# Patient Record
Sex: MALE | Race: WHITE
[De-identification: names, ages, dates, MRNs, and addresses within clinical notes are randomized per-mention and may not be internally consistent; named-entity substitution may affect disease eponyms.]

---

## 2017-05-22 ENCOUNTER — MEDICATION RENEWAL (OUTPATIENT)
Age: 82
End: 2017-05-22

## 2018-04-18 ENCOUNTER — CHART COPY (OUTPATIENT)
Age: 83
End: 2018-04-18

## 2018-04-18 RX ORDER — LUBIPROSTONE 8 UG/1
8 CAPSULE, GELATIN COATED ORAL
Qty: 60 | Refills: 5 | Status: ACTIVE | COMMUNITY
Start: 2017-05-22 | End: 1900-01-01

## 2018-11-28 ENCOUNTER — OUTPATIENT (OUTPATIENT)
Dept: OUTPATIENT SERVICES | Facility: HOSPITAL | Age: 83
LOS: 1 days | End: 2018-11-28
Payer: MEDICARE

## 2018-11-28 DIAGNOSIS — T84.032A MECHANICAL LOOSENING OF INTERNAL RIGHT KNEE PROSTHETIC JOINT, INITIAL ENCOUNTER: ICD-10-CM

## 2018-11-28 PROCEDURE — A9561: CPT

## 2018-11-28 PROCEDURE — 78315 BONE IMAGING 3 PHASE: CPT | Mod: 26

## 2018-11-28 PROCEDURE — 78315 BONE IMAGING 3 PHASE: CPT

## 2020-04-22 ENCOUNTER — TRANSCRIPTION ENCOUNTER (OUTPATIENT)
Age: 85
End: 2020-04-22

## 2020-04-23 ENCOUNTER — INPATIENT (INPATIENT)
Facility: HOSPITAL | Age: 85
LOS: 4 days | Discharge: HOME | DRG: 470 | End: 2020-04-28
Attending: INTERNAL MEDICINE | Admitting: INTERNAL MEDICINE
Payer: MEDICARE

## 2020-04-23 ENCOUNTER — TRANSCRIPTION ENCOUNTER (OUTPATIENT)
Age: 85
End: 2020-04-23

## 2020-04-23 ENCOUNTER — RESULT REVIEW (OUTPATIENT)
Age: 85
End: 2020-04-23

## 2020-04-23 VITALS
SYSTOLIC BLOOD PRESSURE: 158 MMHG | DIASTOLIC BLOOD PRESSURE: 82 MMHG | TEMPERATURE: 99 F | HEART RATE: 83 BPM | OXYGEN SATURATION: 100 % | RESPIRATION RATE: 16 BRPM

## 2020-04-23 DIAGNOSIS — Z96.641 PRESENCE OF RIGHT ARTIFICIAL HIP JOINT: Chronic | ICD-10-CM

## 2020-04-23 DIAGNOSIS — S72.002A FRACTURE OF UNSPECIFIED PART OF NECK OF LEFT FEMUR, INITIAL ENCOUNTER FOR CLOSED FRACTURE: ICD-10-CM

## 2020-04-23 DIAGNOSIS — N40.0 BENIGN PROSTATIC HYPERPLASIA WITHOUT LOWER URINARY TRACT SYMPTOMS: ICD-10-CM

## 2020-04-23 DIAGNOSIS — I10 ESSENTIAL (PRIMARY) HYPERTENSION: ICD-10-CM

## 2020-04-23 DIAGNOSIS — D64.9 ANEMIA, UNSPECIFIED: ICD-10-CM

## 2020-04-23 DIAGNOSIS — E78.00 PURE HYPERCHOLESTEROLEMIA, UNSPECIFIED: ICD-10-CM

## 2020-04-23 DIAGNOSIS — Z95.2 PRESENCE OF PROSTHETIC HEART VALVE: Chronic | ICD-10-CM

## 2020-04-23 DIAGNOSIS — Z96.651 PRESENCE OF RIGHT ARTIFICIAL KNEE JOINT: Chronic | ICD-10-CM

## 2020-04-23 DIAGNOSIS — D72.829 ELEVATED WHITE BLOOD CELL COUNT, UNSPECIFIED: ICD-10-CM

## 2020-04-23 DIAGNOSIS — Z29.9 ENCOUNTER FOR PROPHYLACTIC MEASURES, UNSPECIFIED: ICD-10-CM

## 2020-04-23 DIAGNOSIS — N17.9 ACUTE KIDNEY FAILURE, UNSPECIFIED: ICD-10-CM

## 2020-04-23 DIAGNOSIS — I25.10 ATHEROSCLEROTIC HEART DISEASE OF NATIVE CORONARY ARTERY WITHOUT ANGINA PECTORIS: ICD-10-CM

## 2020-04-23 LAB
ABO RH CONFIRMATION: SIGNIFICANT CHANGE UP
ALBUMIN SERPL ELPH-MCNC: 4 G/DL — SIGNIFICANT CHANGE UP (ref 3.3–5)
ALP SERPL-CCNC: 76 U/L — SIGNIFICANT CHANGE UP (ref 40–120)
ALT FLD-CCNC: 35 U/L — SIGNIFICANT CHANGE UP (ref 12–78)
ANION GAP SERPL CALC-SCNC: 8 MMOL/L — SIGNIFICANT CHANGE UP (ref 5–17)
ANION GAP SERPL CALC-SCNC: 8 MMOL/L — SIGNIFICANT CHANGE UP (ref 5–17)
APTT BLD: 26.3 SEC — LOW (ref 28.5–37)
AST SERPL-CCNC: 26 U/L — SIGNIFICANT CHANGE UP (ref 15–37)
BASOPHILS # BLD AUTO: 0.07 K/UL — SIGNIFICANT CHANGE UP (ref 0–0.2)
BASOPHILS NFR BLD AUTO: 0.5 % — SIGNIFICANT CHANGE UP (ref 0–2)
BILIRUB SERPL-MCNC: 0.8 MG/DL — SIGNIFICANT CHANGE UP (ref 0.2–1.2)
BLD GP AB SCN SERPL QL: SIGNIFICANT CHANGE UP
BUN SERPL-MCNC: 37 MG/DL — HIGH (ref 7–23)
BUN SERPL-MCNC: 46 MG/DL — HIGH (ref 7–23)
CALCIUM SERPL-MCNC: 8.8 MG/DL — SIGNIFICANT CHANGE UP (ref 8.5–10.1)
CALCIUM SERPL-MCNC: 8.8 MG/DL — SIGNIFICANT CHANGE UP (ref 8.5–10.1)
CHLORIDE SERPL-SCNC: 105 MMOL/L — SIGNIFICANT CHANGE UP (ref 96–108)
CHLORIDE SERPL-SCNC: 109 MMOL/L — HIGH (ref 96–108)
CO2 SERPL-SCNC: 25 MMOL/L — SIGNIFICANT CHANGE UP (ref 22–31)
CO2 SERPL-SCNC: 26 MMOL/L — SIGNIFICANT CHANGE UP (ref 22–31)
CREAT SERPL-MCNC: 1.6 MG/DL — HIGH (ref 0.5–1.3)
CREAT SERPL-MCNC: 1.8 MG/DL — HIGH (ref 0.5–1.3)
EOSINOPHIL # BLD AUTO: 0.07 K/UL — SIGNIFICANT CHANGE UP (ref 0–0.5)
EOSINOPHIL NFR BLD AUTO: 0.5 % — SIGNIFICANT CHANGE UP (ref 0–6)
GLUCOSE SERPL-MCNC: 116 MG/DL — HIGH (ref 70–99)
GLUCOSE SERPL-MCNC: 95 MG/DL — SIGNIFICANT CHANGE UP (ref 70–99)
HCT VFR BLD CALC: 28.2 % — LOW (ref 39–50)
HCT VFR BLD CALC: 30.1 % — LOW (ref 39–50)
HGB BLD-MCNC: 8.6 G/DL — LOW (ref 13–17)
HGB BLD-MCNC: 9.4 G/DL — LOW (ref 13–17)
IMM GRANULOCYTES NFR BLD AUTO: 0.4 % — SIGNIFICANT CHANGE UP (ref 0–1.5)
INR BLD: 1.08 RATIO — SIGNIFICANT CHANGE UP (ref 0.88–1.16)
LYMPHOCYTES # BLD AUTO: 0.75 K/UL — LOW (ref 1–3.3)
LYMPHOCYTES # BLD AUTO: 5.5 % — LOW (ref 13–44)
MCHC RBC-ENTMCNC: 28 PG — SIGNIFICANT CHANGE UP (ref 27–34)
MCHC RBC-ENTMCNC: 28.1 PG — SIGNIFICANT CHANGE UP (ref 27–34)
MCHC RBC-ENTMCNC: 30.5 GM/DL — LOW (ref 32–36)
MCHC RBC-ENTMCNC: 31.2 GM/DL — LOW (ref 32–36)
MCV RBC AUTO: 89.9 FL — SIGNIFICANT CHANGE UP (ref 80–100)
MCV RBC AUTO: 91.9 FL — SIGNIFICANT CHANGE UP (ref 80–100)
MONOCYTES # BLD AUTO: 0.68 K/UL — SIGNIFICANT CHANGE UP (ref 0–0.9)
MONOCYTES NFR BLD AUTO: 5 % — SIGNIFICANT CHANGE UP (ref 2–14)
NEUTROPHILS # BLD AUTO: 11.95 K/UL — HIGH (ref 1.8–7.4)
NEUTROPHILS NFR BLD AUTO: 88.1 % — HIGH (ref 43–77)
NRBC # BLD: 0 /100 WBCS — SIGNIFICANT CHANGE UP (ref 0–0)
NRBC # BLD: 0 /100 WBCS — SIGNIFICANT CHANGE UP (ref 0–0)
PLATELET # BLD AUTO: 150 K/UL — SIGNIFICANT CHANGE UP (ref 150–400)
PLATELET # BLD AUTO: 168 K/UL — SIGNIFICANT CHANGE UP (ref 150–400)
POTASSIUM SERPL-MCNC: 4.7 MMOL/L — SIGNIFICANT CHANGE UP (ref 3.5–5.3)
POTASSIUM SERPL-MCNC: 4.7 MMOL/L — SIGNIFICANT CHANGE UP (ref 3.5–5.3)
POTASSIUM SERPL-SCNC: 4.7 MMOL/L — SIGNIFICANT CHANGE UP (ref 3.5–5.3)
POTASSIUM SERPL-SCNC: 4.7 MMOL/L — SIGNIFICANT CHANGE UP (ref 3.5–5.3)
PROT SERPL-MCNC: 7.6 G/DL — SIGNIFICANT CHANGE UP (ref 6–8.3)
PROTHROM AB SERPL-ACNC: 12.1 SEC — SIGNIFICANT CHANGE UP (ref 10–12.9)
RBC # BLD: 3.07 M/UL — LOW (ref 4.2–5.8)
RBC # BLD: 3.35 M/UL — LOW (ref 4.2–5.8)
RBC # FLD: 12.5 % — SIGNIFICANT CHANGE UP (ref 10.3–14.5)
RBC # FLD: 12.7 % — SIGNIFICANT CHANGE UP (ref 10.3–14.5)
SARS-COV-2 RNA SPEC QL NAA+PROBE: SIGNIFICANT CHANGE UP
SODIUM SERPL-SCNC: 139 MMOL/L — SIGNIFICANT CHANGE UP (ref 135–145)
SODIUM SERPL-SCNC: 142 MMOL/L — SIGNIFICANT CHANGE UP (ref 135–145)
TRANSFERRIN SERPL-MCNC: 343 MG/DL — SIGNIFICANT CHANGE UP (ref 200–360)
WBC # BLD: 13.29 K/UL — HIGH (ref 3.8–10.5)
WBC # BLD: 13.57 K/UL — HIGH (ref 3.8–10.5)
WBC # FLD AUTO: 13.29 K/UL — HIGH (ref 3.8–10.5)
WBC # FLD AUTO: 13.57 K/UL — HIGH (ref 3.8–10.5)

## 2020-04-23 PROCEDURE — 72125 CT NECK SPINE W/O DYE: CPT | Mod: 26

## 2020-04-23 PROCEDURE — 73502 X-RAY EXAM HIP UNI 2-3 VIEWS: CPT | Mod: 26,LT

## 2020-04-23 PROCEDURE — 73552 X-RAY EXAM OF FEMUR 2/>: CPT | Mod: 26,LT

## 2020-04-23 PROCEDURE — 93010 ELECTROCARDIOGRAM REPORT: CPT

## 2020-04-23 PROCEDURE — 99285 EMERGENCY DEPT VISIT HI MDM: CPT

## 2020-04-23 PROCEDURE — 88311 DECALCIFY TISSUE: CPT | Mod: 26

## 2020-04-23 PROCEDURE — 70450 CT HEAD/BRAIN W/O DYE: CPT | Mod: 26

## 2020-04-23 PROCEDURE — 88305 TISSUE EXAM BY PATHOLOGIST: CPT | Mod: 26

## 2020-04-23 PROCEDURE — 71045 X-RAY EXAM CHEST 1 VIEW: CPT | Mod: 26

## 2020-04-23 PROCEDURE — 99223 1ST HOSP IP/OBS HIGH 75: CPT

## 2020-04-23 RX ORDER — OXYCODONE HYDROCHLORIDE 5 MG/1
5 TABLET ORAL ONCE
Refills: 0 | Status: DISCONTINUED | OUTPATIENT
Start: 2020-04-23 | End: 2020-04-23

## 2020-04-23 RX ORDER — POLYETHYLENE GLYCOL 3350 17 G/17G
17 POWDER, FOR SOLUTION ORAL DAILY
Refills: 0 | Status: DISCONTINUED | OUTPATIENT
Start: 2020-04-23 | End: 2020-04-28

## 2020-04-23 RX ORDER — ATORVASTATIN CALCIUM 80 MG/1
10 TABLET, FILM COATED ORAL AT BEDTIME
Refills: 0 | Status: DISCONTINUED | OUTPATIENT
Start: 2020-04-23 | End: 2020-04-23

## 2020-04-23 RX ORDER — MORPHINE SULFATE 50 MG/1
2 CAPSULE, EXTENDED RELEASE ORAL EVERY 6 HOURS
Refills: 0 | Status: DISCONTINUED | OUTPATIENT
Start: 2020-04-23 | End: 2020-04-28

## 2020-04-23 RX ORDER — SODIUM CHLORIDE 9 MG/ML
1000 INJECTION, SOLUTION INTRAVENOUS
Refills: 0 | Status: DISCONTINUED | OUTPATIENT
Start: 2020-04-23 | End: 2020-04-23

## 2020-04-23 RX ORDER — SENNA PLUS 8.6 MG/1
2 TABLET ORAL AT BEDTIME
Refills: 0 | Status: DISCONTINUED | OUTPATIENT
Start: 2020-04-23 | End: 2020-04-28

## 2020-04-23 RX ORDER — OXYCODONE HYDROCHLORIDE 5 MG/1
2.5 TABLET ORAL EVERY 4 HOURS
Refills: 0 | Status: DISCONTINUED | OUTPATIENT
Start: 2020-04-23 | End: 2020-04-23

## 2020-04-23 RX ORDER — MORPHINE SULFATE 50 MG/1
1 CAPSULE, EXTENDED RELEASE ORAL ONCE
Refills: 0 | Status: DISCONTINUED | OUTPATIENT
Start: 2020-04-23 | End: 2020-04-23

## 2020-04-23 RX ORDER — SODIUM CHLORIDE 9 MG/ML
1000 INJECTION, SOLUTION INTRAVENOUS
Refills: 0 | Status: DISCONTINUED | OUTPATIENT
Start: 2020-04-23 | End: 2020-04-24

## 2020-04-23 RX ORDER — HYDROMORPHONE HYDROCHLORIDE 2 MG/ML
0.5 INJECTION INTRAMUSCULAR; INTRAVENOUS; SUBCUTANEOUS
Refills: 0 | Status: DISCONTINUED | OUTPATIENT
Start: 2020-04-23 | End: 2020-04-23

## 2020-04-23 RX ORDER — METOPROLOL TARTRATE 50 MG
50 TABLET ORAL DAILY
Refills: 0 | Status: DISCONTINUED | OUTPATIENT
Start: 2020-04-23 | End: 2020-04-28

## 2020-04-23 RX ORDER — ACETAMINOPHEN 500 MG
650 TABLET ORAL EVERY 6 HOURS
Refills: 0 | Status: COMPLETED | OUTPATIENT
Start: 2020-04-23 | End: 2020-04-26

## 2020-04-23 RX ORDER — BENZOCAINE AND MENTHOL 5; 1 G/100ML; G/100ML
1 LIQUID ORAL
Refills: 0 | Status: DISCONTINUED | OUTPATIENT
Start: 2020-04-23 | End: 2020-04-28

## 2020-04-23 RX ORDER — MORPHINE SULFATE 50 MG/1
3 CAPSULE, EXTENDED RELEASE ORAL EVERY 6 HOURS
Refills: 0 | Status: DISCONTINUED | OUTPATIENT
Start: 2020-04-23 | End: 2020-04-23

## 2020-04-23 RX ORDER — ONDANSETRON 8 MG/1
4 TABLET, FILM COATED ORAL ONCE
Refills: 0 | Status: DISCONTINUED | OUTPATIENT
Start: 2020-04-23 | End: 2020-04-23

## 2020-04-23 RX ORDER — ASCORBIC ACID 60 MG
500 TABLET,CHEWABLE ORAL
Refills: 0 | Status: DISCONTINUED | OUTPATIENT
Start: 2020-04-23 | End: 2020-04-28

## 2020-04-23 RX ORDER — FOLIC ACID 0.8 MG
1 TABLET ORAL DAILY
Refills: 0 | Status: DISCONTINUED | OUTPATIENT
Start: 2020-04-23 | End: 2020-04-28

## 2020-04-23 RX ORDER — HEPARIN SODIUM 5000 [USP'U]/ML
5000 INJECTION INTRAVENOUS; SUBCUTANEOUS EVERY 8 HOURS
Refills: 0 | Status: DISCONTINUED | OUTPATIENT
Start: 2020-04-23 | End: 2020-04-26

## 2020-04-23 RX ORDER — MORPHINE SULFATE 50 MG/1
2 CAPSULE, EXTENDED RELEASE ORAL EVERY 4 HOURS
Refills: 0 | Status: DISCONTINUED | OUTPATIENT
Start: 2020-04-23 | End: 2020-04-23

## 2020-04-23 RX ORDER — FINASTERIDE 5 MG/1
5 TABLET, FILM COATED ORAL DAILY
Refills: 0 | Status: DISCONTINUED | OUTPATIENT
Start: 2020-04-23 | End: 2020-04-28

## 2020-04-23 RX ORDER — ATORVASTATIN CALCIUM 80 MG/1
10 TABLET, FILM COATED ORAL AT BEDTIME
Refills: 0 | Status: DISCONTINUED | OUTPATIENT
Start: 2020-04-23 | End: 2020-04-28

## 2020-04-23 RX ORDER — TRAMADOL HYDROCHLORIDE 50 MG/1
1 TABLET ORAL
Qty: 0 | Refills: 0 | DISCHARGE

## 2020-04-23 RX ORDER — LANOLIN ALCOHOL/MO/W.PET/CERES
3 CREAM (GRAM) TOPICAL AT BEDTIME
Refills: 0 | Status: DISCONTINUED | OUTPATIENT
Start: 2020-04-23 | End: 2020-04-28

## 2020-04-23 RX ORDER — FINASTERIDE 5 MG/1
5 TABLET, FILM COATED ORAL DAILY
Refills: 0 | Status: DISCONTINUED | OUTPATIENT
Start: 2020-04-23 | End: 2020-04-23

## 2020-04-23 RX ORDER — SENNA PLUS 8.6 MG/1
2 TABLET ORAL AT BEDTIME
Refills: 0 | Status: DISCONTINUED | OUTPATIENT
Start: 2020-04-23 | End: 2020-04-23

## 2020-04-23 RX ORDER — CEFAZOLIN SODIUM 1 G
2000 VIAL (EA) INJECTION EVERY 8 HOURS
Refills: 0 | Status: COMPLETED | OUTPATIENT
Start: 2020-04-23 | End: 2020-04-24

## 2020-04-23 RX ORDER — ESCITALOPRAM OXALATE 10 MG/1
20 TABLET, FILM COATED ORAL DAILY
Refills: 0 | Status: DISCONTINUED | OUTPATIENT
Start: 2020-04-23 | End: 2020-04-23

## 2020-04-23 RX ORDER — MAGNESIUM HYDROXIDE 400 MG/1
30 TABLET, CHEWABLE ORAL DAILY
Refills: 0 | Status: DISCONTINUED | OUTPATIENT
Start: 2020-04-23 | End: 2020-04-28

## 2020-04-23 RX ORDER — TRAMADOL HYDROCHLORIDE 50 MG/1
50 TABLET ORAL EVERY 6 HOURS
Refills: 0 | Status: DISCONTINUED | OUTPATIENT
Start: 2020-04-23 | End: 2020-04-28

## 2020-04-23 RX ORDER — MORPHINE SULFATE 50 MG/1
1 CAPSULE, EXTENDED RELEASE ORAL ONCE
Refills: 0 | Status: COMPLETED | OUTPATIENT
Start: 2020-04-23 | End: 2020-04-23

## 2020-04-23 RX ORDER — OXYCODONE HYDROCHLORIDE 5 MG/1
5 TABLET ORAL EVERY 4 HOURS
Refills: 0 | Status: DISCONTINUED | OUTPATIENT
Start: 2020-04-23 | End: 2020-04-23

## 2020-04-23 RX ORDER — ONDANSETRON 8 MG/1
4 TABLET, FILM COATED ORAL EVERY 6 HOURS
Refills: 0 | Status: DISCONTINUED | OUTPATIENT
Start: 2020-04-23 | End: 2020-04-28

## 2020-04-23 RX ORDER — TRAMADOL HYDROCHLORIDE 50 MG/1
25 TABLET ORAL EVERY 4 HOURS
Refills: 0 | Status: DISCONTINUED | OUTPATIENT
Start: 2020-04-23 | End: 2020-04-28

## 2020-04-23 RX ORDER — HYDROMORPHONE HYDROCHLORIDE 2 MG/ML
0.5 INJECTION INTRAMUSCULAR; INTRAVENOUS; SUBCUTANEOUS EVERY 4 HOURS
Refills: 0 | Status: DISCONTINUED | OUTPATIENT
Start: 2020-04-23 | End: 2020-04-23

## 2020-04-23 RX ORDER — METOPROLOL TARTRATE 50 MG
50 TABLET ORAL DAILY
Refills: 0 | Status: DISCONTINUED | OUTPATIENT
Start: 2020-04-23 | End: 2020-04-23

## 2020-04-23 RX ORDER — ACETAMINOPHEN 500 MG
1000 TABLET ORAL EVERY 6 HOURS
Refills: 0 | Status: DISCONTINUED | OUTPATIENT
Start: 2020-04-23 | End: 2020-04-23

## 2020-04-23 RX ORDER — ESCITALOPRAM OXALATE 10 MG/1
20 TABLET, FILM COATED ORAL DAILY
Refills: 0 | Status: DISCONTINUED | OUTPATIENT
Start: 2020-04-23 | End: 2020-04-28

## 2020-04-23 RX ORDER — SODIUM CHLORIDE 9 MG/ML
1000 INJECTION INTRAMUSCULAR; INTRAVENOUS; SUBCUTANEOUS
Refills: 0 | Status: DISCONTINUED | OUTPATIENT
Start: 2020-04-23 | End: 2020-04-23

## 2020-04-23 RX ORDER — CEFAZOLIN SODIUM 1 G
2000 VIAL (EA) INJECTION ONCE
Refills: 0 | Status: COMPLETED | OUTPATIENT
Start: 2020-04-23 | End: 2020-04-23

## 2020-04-23 RX ORDER — ACETAMINOPHEN 500 MG
650 TABLET ORAL EVERY 4 HOURS
Refills: 0 | Status: DISCONTINUED | OUTPATIENT
Start: 2020-04-23 | End: 2020-04-23

## 2020-04-23 RX ORDER — ATORVASTATIN CALCIUM 80 MG/1
10 TABLET, FILM COATED ORAL DAILY
Refills: 0 | Status: DISCONTINUED | OUTPATIENT
Start: 2020-04-23 | End: 2020-04-23

## 2020-04-23 RX ADMIN — Medication 50 MILLIGRAM(S): at 12:47

## 2020-04-23 RX ADMIN — Medication 100 MILLIGRAM(S): at 22:10

## 2020-04-23 RX ADMIN — Medication 10 MILLIGRAM(S): at 22:09

## 2020-04-23 RX ADMIN — SODIUM CHLORIDE 75 MILLILITER(S): 9 INJECTION, SOLUTION INTRAVENOUS at 14:30

## 2020-04-23 RX ADMIN — Medication 650 MILLIGRAM(S): at 23:05

## 2020-04-23 RX ADMIN — MORPHINE SULFATE 1 MILLIGRAM(S): 50 CAPSULE, EXTENDED RELEASE ORAL at 12:29

## 2020-04-23 RX ADMIN — ATORVASTATIN CALCIUM 10 MILLIGRAM(S): 80 TABLET, FILM COATED ORAL at 22:10

## 2020-04-23 RX ADMIN — HEPARIN SODIUM 5000 UNIT(S): 5000 INJECTION INTRAVENOUS; SUBCUTANEOUS at 23:04

## 2020-04-23 RX ADMIN — SODIUM CHLORIDE 75 MILLILITER(S): 9 INJECTION, SOLUTION INTRAVENOUS at 18:56

## 2020-04-23 RX ADMIN — Medication 3 MILLIGRAM(S): at 22:09

## 2020-04-23 NOTE — DISCHARGE NOTE PROVIDER - PROVIDER TOKENS
PROVIDER:[TOKEN:[[id number]],FOLLOWUP:[2 weeks]] PROVIDER:[TOKEN:[[id number]],FOLLOWUP:[2 weeks]],PROVIDER:[TOKEN:[[id number]]] PROVIDER:[TOKEN:[[id number]],FOLLOWUP:[2 weeks]],PROVIDER:[TOKEN:[[id number]]],PROVIDER:[TOKEN:[[id number]],FOLLOWUP:[1 month]]

## 2020-04-23 NOTE — H&P ADULT - PROBLEM SELECTOR PLAN 2
Patient w/ no sick contacts and w/o cough, SOB, fever/chills  - Hemodynamically stable, afebrile  - F/u COVID PCR  - F/u d-dimer, crp, procal in AM BUN 46, Cr 1.8 on admission, Likely 2/2 Ac Urinary retention , Bladder scan at bed side STAT,   -Sherman cath   - Per wife, no hx of kidney disease  - Unknown etiology at this time, per wife has been eating and drinking normally; may be secondary to mild rhabdo as patient on floor following fall yesterday  - Check U/A and CK   - Continue IVFs w/ LR @ 75cc/hr for now  - F/u BMP in AM

## 2020-04-23 NOTE — ED ADULT NURSE NOTE - NSIMPLEMENTINTERV_GEN_ALL_ED
Implemented All Fall Risk Interventions:  [place] to call system. Call bell, personal items and telephone within reach. Instruct patient to call for assistance. Room bathroom lighting operational. Non-slip footwear when patient is off stretcher. Physically safe environment: no spills, clutter or unnecessary equipment. Stretcher in lowest position, wheels locked, appropriate side rails in place. Provide visual cue, wrist band, yellow gown, etc. Monitor gait and stability. Monitor for mental status changes and reorient to person, place, and time. Review medications for side effects contributing to fall risk. Reinforce activity limits and safety measures with patient and family.

## 2020-04-23 NOTE — H&P ADULT - NEGATIVE GASTROINTESTINAL SYMPTOMS
no nausea/no diarrhea/no vomiting/no abdominal pain no vomiting/no diarrhea/no nausea/no abdominal pain/no constipation

## 2020-04-23 NOTE — H&P ADULT - ASSESSMENT
84y/o M with PMH of HTN, HLD, CAD (s/p open heart surgery 5yrs ago), chronic pain (s/p knee and rt hip replacement), BPH, and mild confusion (per wife) BIBEMS to [facility] ED s/p fall, admitted for left hip fracture. 86y/o M with PMH of HTN, HLD, CAD (s/p open heart surgery 5yrs ago), chronic pain (s/p knee and rt hip replacement), BPH, and mild confusion (per wife) BIBEMS to [facility] ED s/p fall, admitted for left hip fracture.

## 2020-04-23 NOTE — H&P ADULT - HISTORY OF PRESENT ILLNESS
86y/o M with PMH of HTN [facility]. Patient fell while in kitchen using cane, reports left hip pain. Difficulty bearing weight. Left leg externally rotated and shortened. Denies LOC, headache, neck or back pain. Denies CP, SOB, cough, n/v/d.     The date the pt first felt unwell:   Fever or chills: yes [  ]   no [  ]   - Tmax:   Fatigue, malaise or generalized weakness: yes [  ]   no [  ]  Shortness of breath/dyspnea: yes [  ]   no [  ]  Cough: yes [  ]   no [  ], sputum production: yes [  ]   no [  ]  Anorexia/po intolerance: yes [  ]   no [  ]  Chest pain or chest tightness: yes [  ]   no [  ]  Myalgias: yes [  ]   no [  ]  Headache: yes [  ]   no [  ]  Sore throat: yes [  ]   no [  ]  Rhinorrhea: yes [  ]   no [  ]  Abd pain: yes [  ]   no [  ]  Nausea: yes [  ]   no [  ]  Vomiting: yes [  ]   no [  ]  Diarrhea: yes [  ]   no [  ]  Loss of sense of smell/anosmia: yes [  ]   no [  ]  Conjunctivitis: yes [  ]   no [  ]  Recent travel: yes [  ]   no [  ] - Location:   Any sick contacts: yes [  ]   no [  ]  Close contact with someone confirmed positive with COVID-19 / SARS-CoV2 in the last 14 days: yes [  ]   no [  ]  Other associated complaints:   Code status:     In ED,  Vitals: T 98.9F, HR 83, /82, RR 16, SpO2 4L NC  Labs significant for: WBC 13.57, N/L ratio 15.9, H/H 9.4/30.1, BUN 46, Cr 1.8  EKG: NSR at 78bpm  CT head non cont:  no acute intracranial hemorrhage or mass effect.  CT cervical spine non cont: no evidence of cervical spine fracture.  CXR: pending  Left hip xray: pending  COVID PCR obtained while in ED. 84y/o M with PMH of HTN, HLD, CAD (s/p open heart surgery 5yrs ago), chronic pain (s/p knee and rt hip replacement), BPH, and mild confusion (per wife) B[facility]EMS to [facility] s/p fall. Per wife, whom patient lives with, patient was ambulating with his cane when he lost balance and fell. Patient was in normal state of health prior to this time. Patient is often is "off balance." Wife was unable to get him off ground so called fire department. Fire department said he was fine, and put him into bed. Wife noticed his left hip looked like it was "sticking out" and leg appeared "out-turned." Wife was going to take patient for x-rays today, however early this morning, patient fell out of bed so she decided to call EMS. Patient w/ difficulty bearing weight. Denies LOC, headache, neck or back pain. Denies CP, palpitations, SOB, cough, n/v/d. Has been eating and drinking well. Patient has been quarantined with his wife in their home for 5 weeks.    Fever or chills: yes [  ]   no [ X]  Fatigue, malaise or generalized weakness: yes [  ]   no [ X ]  Shortness of breath/dyspnea: yes [  ]   no [  x]  Cough: yes [  ]   no [x  ]  Anorexia/po intolerance: yes [  ]   no [ x ]  Chest pain or chest tightness: yes [  ]   no [ x ]  Myalgias: yes [  ]   no [ x ]  Headache: yes [  ]   no [ x ]  Sore throat: yes [  ]   no [ x ]  Rhinorrhea: yes [  ]   no [x  ]  Abd pain: yes [  ]   no [ x ]  Nausea: yes [  ]   no [x  ]  Vomiting: yes [  ]   no [  x]  Diarrhea: yes [  ]   no [ x ]  Loss of sense of smell/anosmia: yes [  ]   no [x  ]  Conjunctivitis: yes [  ]   no [x  ]  Recent travel: yes [  ]   no [ x ]  Any sick contacts: yes [  ]   no [x  ]  Close contact with someone confirmed positive with COVID-19 / SARS-CoV2 in the last 14 days: yes [  ]   no [x  ]    In ED,  Vitals: T 98.9F, HR 83, /82, RR 16, SpO2 4L NC  Labs significant for: WBC 13.57, N/L ratio 15.9, H/H 9.4/30.1, BUN 46, Cr 1.8  EKG: NSR at 78bpm  CT head non cont:  no acute intracranial hemorrhage or mass effect.  CT cervical spine non cont: no evidence of cervical spine fracture.  CXR: pending  Left hip xray: pending  COVID PCR obtained while in ED. 86y/o M with PMH of HTN, HLD, CAD (s/p open heart surgery 5yrs ago), chronic pain (s/p knee and rt hip replacement), BPH, and mild confusion (per wife) B[facility]S to [facility] s/p fall. Per wife, whom patient lives with, patient was ambulating with his cane when he lost balance and fell. Patient was in normal state of health prior to this time. Patient is often is "off balance." Wife was unable to get him off ground so called fire department. Fire department said he was fine, and put him into bed. Wife noticed his left hip looked like it was "sticking out" and leg appeared "out-turned." Wife was going to take patient for x-rays today, however early this morning, patient fell out of bed so she decided to call EMS. Patient w/ difficulty bearing weight. Denies LOC, headache, neck or back pain. Denies CP, palpitations, SOB, cough, n/v/d. Has been eating and drinking well. Patient has been quarantined with his wife in their home for 5 weeks.    Fever or chills: yes [  ]   no [ X]  Fatigue, malaise or generalized weakness: yes [  ]   no [ X ]  Shortness of breath/dyspnea: yes [  ]   no [  x]  Cough: yes [  ]   no [x  ]  Anorexia/po intolerance: yes [  ]   no [ x ]  Chest pain or chest tightness: yes [  ]   no [ x ]  Myalgias: yes [  ]   no [ x ]  Headache: yes [  ]   no [ x ]  Sore throat: yes [  ]   no [ x ]  Rhinorrhea: yes [  ]   no [x  ]  Abd pain: yes [  ]   no [ x ]  Nausea: yes [  ]   no [x  ]  Vomiting: yes [  ]   no [  x]  Diarrhea: yes [  ]   no [ x ]  Loss of sense of smell/anosmia: yes [  ]   no [x  ]  Conjunctivitis: yes [  ]   no [x  ]  Recent travel: yes [  ]   no [ x ]  Any sick contacts: yes [  ]   no [x  ]  Close contact with someone confirmed positive with COVID-19 / SARS-CoV2 in the last 14 days: yes [  ]   no [x  ]    In ED,  Vitals: T 98.9F, HR 83, /82, RR 16, SpO2 4L NC  Labs significant for: WBC 13.57, N/L ratio 15.9, H/H 9.4/30.1, BUN 46, Cr 1.8  EKG: NSR at 78bpm  CT head non cont:  no acute intracranial hemorrhage or mass effect.  CT cervical spine non cont: no evidence of cervical spine fracture.  CXR: no acute pulmonary process  Left hip xray: subcapital left femoral neck fracture, marked external rotation of hip, age indeterminant fracture deformity involving the left ischiopubic ramus  COVID PCR obtained while in ED. 86y/o M with PMH of HTN, HLD, Non obstructive CAD (s/p AVR ) 2015 , chronic pain (s/p knee and rt hip replacement), BPH, and mild confusion (per wife) [facility]. Per wife, whom patient lives with, patient was ambulating with his cane when he lost balance and fell. Patient was in normal state of health prior to this time. Patient is often is "off balance." Wife was unable to get him off ground so called fire department. Fire department said he was fine, and put him into bed. Wife noticed his left hip looked like it was "sticking out" and leg appeared "out-turned." Wife was going to take patient for x-rays today, however early this morning, patient fell out of bed so she decided to call EMS. Patient w/ difficulty bearing weight. Denies LOC, headache, neck or back pain. Denies CP, palpitations, SOB, cough, n/v/d. Has been eating and drinking well. Patient has been quarantined with his wife in their home for 5 weeks.    Fever or chills: yes [  ]   no [ X]  Fatigue, malaise or generalized weakness: yes [  ]   no [ X ]  Shortness of breath/dyspnea: yes [  ]   no [  x]  Cough: yes [  ]   no [x  ]  Anorexia/po intolerance: yes [  ]   no [ x ]  Chest pain or chest tightness: yes [  ]   no [ x ]  Myalgias: yes [  ]   no [ x ]  Headache: yes [  ]   no [ x ]  Sore throat: yes [  ]   no [ x ]  Rhinorrhea: yes [  ]   no [x  ]  Abd pain: yes [  ]   no [ x ]  Nausea: yes [  ]   no [x  ]  Vomiting: yes [  ]   no [  x]  Diarrhea: yes [  ]   no [ x ]  Loss of sense of smell/anosmia: yes [  ]   no [x  ]  Conjunctivitis: yes [  ]   no [x  ]  Recent travel: yes [  ]   no [ x ]  Any sick contacts: yes [  ]   no [x  ]  Close contact with someone confirmed positive with COVID-19 / SARS-CoV2 in the last 14 days: yes [  ]   no [x  ]    In ED,  Vitals: T 98.9F, HR 83, /82, RR 16, SpO2 4L NC  Labs significant for: WBC 13.57, N/L ratio 15.9, H/H 9.4/30.1, BUN 46, Cr 1.8  EKG: NSR at 78bpm  CT head non cont:  no acute intracranial hemorrhage or mass effect.  CT cervical spine non cont: no evidence of cervical spine fracture.  CXR: no acute pulmonary process  Left hip xray: subcapital left femoral neck fracture, marked external rotation of hip, age indeterminant fracture deformity involving the left ischiopubic ramus  COVID PCR obtained while in ED. Pt  C/O urinary hesitancy & difficulty on  urination.

## 2020-04-23 NOTE — H&P ADULT - RS GEN PE MLT RESP DETAILS PC
no chest wall tenderness/no rhonchi/respirations non-labored/breath sounds equal/no rales/no wheezes/good air movement/clear to auscultation bilaterally

## 2020-04-23 NOTE — ED PROVIDER NOTE - OBJECTIVE STATEMENT
pt bib ems for left hip pain s/p fall while getting out of bed this am. no loc, ha, neck or back pain, arm pain, cp, sob, abd pain, d/n/v, weakness, numbness.  pmd - koven pt bib ems for left hip pain s/p fall while getting out of bed this am. no loc, ha, neck or back pain, arm pain, cp, sob, abd pain, d/n/v, weakness, numbness. pt declined pain meds  pmd - koven

## 2020-04-23 NOTE — CONSULT NOTE ADULT - ASSESSMENT
85-year-old gentleman with a left hip fracture after mechanical fall now going for surgery. Review of his cardiology records reveals that he had nonobstructive coronary artery disease with severe aortic stenosis in 2015 for which he underwent minimally invasive surgery with a bioprosthetic aortic valve replacement. Echocardiography several years ago revealed normal left ventricular function with a normally functioning valve and no significant abnormalities. He has no evidence for active cardiac issues such as ischemia, heart failure, dysrhythmias, or valvular dysfunction. He is an optimal cardiovascular condition for planned surgery    Recommend    Proceed with planned surgery using routine hemodynamic monitoring    Continue medications perioperatively    We will follow closely with you    Further management can be dependent on his clinical course 85-year-old gentleman with a left hip fracture after mechanical fall now going for surgery. Review of his cardiology records reveals that he had nonobstructive coronary artery disease with severe aortic stenosis in 2015 for which he underwent minimally invasive surgery with a bioprosthetic aortic valve replacement. Echocardiography several years ago revealed normal left ventricular function with a normally functioning valve and no significant abnormalities. He has no evidence for active cardiac issues such as ischemia, heart failure, dysrhythmias, or valvular dysfunction. He is an optimal cardiovascular condition for planned surgery    Recommend    Proceed with planned surgery using routine hemodynamic monitoring    Continue medications perioperatively including metoprolol    We will follow closely with you    Further management can be dependent on his clinical course

## 2020-04-23 NOTE — H&P ADULT - NEUROLOGICAL DETAILS
alert and oriented x 3/sensation intact/cranial nerves intact cranial nerves intact/strength decreased/alert and oriented x 3/left leg weakness/sensation intact

## 2020-04-23 NOTE — H&P ADULT - NEGATIVE NEUROLOGICAL SYMPTOMS
no syncope/no headache/no loss of consciousness no syncope/no headache/no loss of consciousness/no weakness

## 2020-04-23 NOTE — PROGRESS NOTE ADULT - PROBLEM SELECTOR PLAN 2
Patient w/ no sick contacts and w/o cough, SOB, fever/chills  - Hemodynamically stable, afebrile  - COVID PCR negative  - F/u d-dimer, crp, procal in AM

## 2020-04-23 NOTE — DISCHARGE NOTE PROVIDER - CARE PROVIDERS DIRECT ADDRESSES
,DirectAddress_Unknown ,DirectAddress_Unknown,DirectAddress_Unknown ,DirectAddress_Unknown,DirectAddress_Unknown,[email or web address]

## 2020-04-23 NOTE — H&P ADULT - GASTROINTESTINAL DETAILS
soft/nontender/no masses palpable/normal/no distention/bowel sounds normal no distention/no bruit/bowel sounds normal/no organomegaly/normal/nontender/soft/no masses palpable/no rebound tenderness

## 2020-04-23 NOTE — CONSULT NOTE ADULT - ASSESSMENT
A/P:  Patient is a 85y Male w/ L FN Fx    -Plan for OR L hip judy.  -NPO except medications  -IVF while NPO  -Please hold chemical DVT ppx, SCDs okay  -FU Preop labs  -Medical management appreciated by primary medical team, please comment on patients optimization/clearance for OR  -Multimodal Analgesia - recommend low dose opioids and acetaminophen as tolerated  -NWB, bedrest  -Ice and elevate as tolerated  -Recommend Ca & Vit D supplementation  -Recommend DEXA scan/Osteoporosis workup outpatient and treatment as needed  -Recommend follow up w/ outpatient endocrinologist   -All Patient's and Family Member's questions answered. Patient/family understand and agree w/ plan.  -Will discuss w/ attending and advise if plan changes.

## 2020-04-23 NOTE — DISCHARGE NOTE PROVIDER - HOSPITAL COURSE
FROM ADMISSION H+P:     HPI:    84y/o M with PMH of HTN, HLD, Non obstructive CAD (s/p AVR ) 2015 , chronic pain (s/p knee and rt hip replacement), BPH, and mild confusion (per wife) BIBEMS to [facility] ED s/p fall. Per wife, whom patient lives with, patient was ambulating with his cane when he lost balance and fell. Patient was in normal state of health prior to this time. Patient is often is "off balance." Wife was unable to get him off ground so called fire department. Fire department said he was fine, and put him into bed. Wife noticed his left hip looked like it was "sticking out" and leg appeared "out-turned." Wife was going to take patient for x-rays today, however early this morning, patient fell out of bed so she decided to call EMS. Patient w/ difficulty bearing weight. Denies LOC, headache, neck or back pain. Denies CP, palpitations, SOB, cough, n/v/d. Has been eating and drinking well. Patient has been quarantined with his wife in their home for 5 weeks.        Fever or chills: yes [  ]   no [ X]    Fatigue, malaise or generalized weakness: yes [  ]   no [ X ]    Shortness of breath/dyspnea: yes [  ]   no [  x]    Cough: yes [  ]   no [x  ]    Anorexia/po intolerance: yes [  ]   no [ x ]    Chest pain or chest tightness: yes [  ]   no [ x ]    Myalgias: yes [  ]   no [ x ]    Headache: yes [  ]   no [ x ]    Sore throat: yes [  ]   no [ x ]    Rhinorrhea: yes [  ]   no [x  ]    Abd pain: yes [  ]   no [ x ]    Nausea: yes [  ]   no [x  ]    Vomiting: yes [  ]   no [  x]    Diarrhea: yes [  ]   no [ x ]    Loss of sense of smell/anosmia: yes [  ]   no [x  ]    Conjunctivitis: yes [  ]   no [x  ]    Recent travel: yes [  ]   no [ x ]    Any sick contacts: yes [  ]   no [x  ]    Close contact with someone confirmed positive with COVID-19 / SARS-CoV2 in the last 14 days: yes [  ]   no [x  ]        In ED,    Vitals: T 98.9F, HR 83, /82, RR 16, SpO2 4L NC    Labs significant for: WBC 13.57, N/L ratio 15.9, H/H 9.4/30.1, BUN 46, Cr 1.8    EKG: NSR at 78bpm    CT head non cont:  no acute intracranial hemorrhage or mass effect.    CT cervical spine non cont: no evidence of cervical spine fracture.    CXR: no acute pulmonary process    Left hip xray: subcapital left femoral neck fracture, marked external rotation of hip, age indeterminant fracture deformity involving the left ischiopubic ramus    COVID PCR obtained while in ED. Pt  C/O urinary hesitancy & difficulty on  urination. (23 Apr 2020 07:10)            ---    HOSPITAL COURSE: Patient admitted for left femoral neck fracture s/p fall. He underwent hemiarthroplasty on 4/23/2020 with Dr. Gandhi (ortho), tolerated procedure well without complications. Patient was evaluated and optimized by primary team and cardiology (Dr. Diez) for the procedure. Patient was also evaluated by Heme/onc (Dr. Israel) for worsening anemia which is likely multifactorial. He received 1u of PRBC transfusion and was started on venofer x3 doses. He was able to maintain his H/H after transfusion. Patient's renal function also improved during hospitalization (Cr 1.3 on 4/26) Patient was evaluated by PT and OT during hospitalization. At this time, patient is stable for discharge home with home care PT and close outpatient follow up.         ---    CONSULTANTS:     Dr. Israel (Heme/onc)    Dr. Gandhi (Ortho)    Dr. Diez (Cardio) FROM ADMISSION H+P:     HPI:    86y/o M with PMH of HTN, HLD, Non obstructive CAD (s/p AVR ) 2015 , chronic pain (s/p knee and rt hip replacement), BPH, and mild confusion (per wife) BIBEMS to [facility] ED s/p fall. Per wife, whom patient lives with, patient was ambulating with his cane when he lost balance and fell. Patient was in normal state of health prior to this time. Patient is often is "off balance." Wife was unable to get him off ground so called fire department. Fire department said he was fine, and put him into bed. Wife noticed his left hip looked like it was "sticking out" and leg appeared "out-turned." Wife was going to take patient for x-rays today, however early this morning, patient fell out of bed so she decided to call EMS. Patient w/ difficulty bearing weight. Denies LOC, headache, neck or back pain. Denies CP, palpitations, SOB, cough, n/v/d. Has been eating and drinking well. Patient has been quarantined with his wife in their home for 5 weeks.        Fever or chills: yes [  ]   no [ X]    Fatigue, malaise or generalized weakness: yes [  ]   no [ X ]    Shortness of breath/dyspnea: yes [  ]   no [  x]    Cough: yes [  ]   no [x  ]    Anorexia/po intolerance: yes [  ]   no [ x ]    Chest pain or chest tightness: yes [  ]   no [ x ]    Myalgias: yes [  ]   no [ x ]    Headache: yes [  ]   no [ x ]    Sore throat: yes [  ]   no [ x ]    Rhinorrhea: yes [  ]   no [x  ]    Abd pain: yes [  ]   no [ x ]    Nausea: yes [  ]   no [x  ]    Vomiting: yes [  ]   no [  x]    Diarrhea: yes [  ]   no [ x ]    Loss of sense of smell/anosmia: yes [  ]   no [x  ]    Conjunctivitis: yes [  ]   no [x  ]    Recent travel: yes [  ]   no [ x ]    Any sick contacts: yes [  ]   no [x  ]    Close contact with someone confirmed positive with COVID-19 / SARS-CoV2 in the last 14 days: yes [  ]   no [x  ]        In ED,    Vitals: T 98.9F, HR 83, /82, RR 16, SpO2 4L NC    Labs significant for: WBC 13.57, N/L ratio 15.9, H/H 9.4/30.1, BUN 46, Cr 1.8    EKG: NSR at 78bpm    CT head non cont:  no acute intracranial hemorrhage or mass effect.    CT cervical spine non cont: no evidence of cervical spine fracture.    CXR: no acute pulmonary process    Left hip xray: subcapital left femoral neck fracture, marked external rotation of hip, age indeterminant fracture deformity involving the left ischiopubic ramus    COVID PCR obtained while in ED. Pt  C/O urinary hesitancy & difficulty on  urination. (23 Apr 2020 07:10)            ---    HOSPITAL COURSE: Patient admitted for left femoral neck fracture s/p fall. He underwent hemiarthroplasty on 4/23/2020 with Dr. Gandhi (ortho), tolerated procedure well without complications. Patient was evaluated and optimized by primary team and cardiology (Dr. Diez) for the procedure. Patient was also evaluated by Heme/onc (Dr. Israel) for worsening anemia which is likely multifactorial. He received 1u of PRBC transfusion and was started on venofer x3 doses. He was able to maintain his H/H after transfusion. Patient's renal function also improved during hospitalization (Cr 1.3 on 4/26) Patient was evaluated by PT and OT during hospitalization. During hospitalization, patient was unable to urinate.  bladder scan was performed, which showed 374 ccs. Straight cath was done which relieved 275 ccs urine. Patient given enema as patient has difficulty urinating without having a bowel movement At this time, patient is stable for discharge home with home care PT and close outpatient follow up.         ---    CONSULTANTS:     Dr. Israel (Heme/onc)    Dr. Gandhi (Ortho)    Dr. Diez (Cardio)             VITAL SIGNS:    Vital Signs Last 24 Hrs    T(C): 36.6 (28 Apr 2020 04:53), Max: 36.8 (27 Apr 2020 20:51)    T(F): 97.9 (28 Apr 2020 04:53), Max: 98.2 (27 Apr 2020 20:51)    HR: 89 (28 Apr 2020 04:53) (73 - 89)    BP: 158/90 (28 Apr 2020 04:53) (138/81 - 158/90)    BP(mean): --    RR: 18 (28 Apr 2020 04:53) (17 - 18)    SpO2: 93% (28 Apr 2020 04:53) (93% - 98%)            PHYSICAL EXAM:    PHYSICAL EXAM: Pt is forgetful    GENERAL:  [x  ] NAD , [  x] well appearing, [  ] Agitated, [  ] Drowsy,  [  ] Lethargy, [  ] confused     HEAD:  [x  ] Normal, [  ] Other    EYES:  [x  ] EOMI, [x  ] PERRLA, [  ] conjunctiva and sclera clear normal, [  ] Other,  [  ] Pallor,[  ] Discharge    ENMT:  [x ] Normal, [x  ] Dry  mucous membranes, [ x ] Good dentition, [x  ] No Thrush    NECK:  [x  ] Supple, [ x ] No JVD, [ x ] Normal thyroid, [  ] Lymphadenopathy [  ] Other    CHEST/LUNG:  [x  ] Clear to auscultation bilaterally, [ x ] Breath Sounds equal ,  [x  ] No rales, [x  ] No rhonchi  [x  ]  No wheezing    HEART:  [x  ] Regular rate and rhythm, [  ] tachycardia, [  ] Bradycardia,  [  ] irregular  [ x ] No murmurs, No rubs, No gallops, [  ] PPM in place (Mfr:  )    ABDOMEN:  [x  ] Soft, [ x ] Nontender, [ x ] Nondistended, [ x ] No mass, [ x ] Bowel sounds present, [x  ] obese    NERVOUS SYSTEM:  [ x ] Alert & Oriented X 1-,2 [ x ] Nonfocal  [x ] Confusion  [  ] Encephalopathic [  ] Sedated [  ] Unable to assess, [ x ] Other-? Dementia    EXTREMITIES: [ x ] 2+ Peripheral Pulses, No clubbing, No cyanosis, Left Hip dressing, mild swelling [  ] edema B/L lower EXT. [  ] PVD stasis skin changes B/L Lower EXT    LYMPH: No lymphadenopathy noted    SKIN:  [ x ] No rashes or lesions, [  ] Pressure Ulcers, [  ] ecchymosis, [  ] Skin Tears, [  ] Other FROM ADMISSION H+P:     HPI:    86y/o M with PMH of HTN, HLD, Non obstructive CAD (s/p AVR ) 2015 , chronic pain (s/p knee and rt hip replacement), BPH, and mild confusion (per wife) [facility] [place] [facility] s/p fall. Per wife, whom patient lives with, patient was ambulating with his cane when he lost balance and fell. Patient was in normal state of health prior to this time. Patient is often is "off balance." Wife was unable to get him off ground so called fire department. Fire department said he was fine, and put him into bed. Wife noticed his left hip looked like it was "sticking out" and leg appeared "out-turned." Wife was going to take patient for x-rays today, however early this morning, patient fell out of bed so she decided to call EMS. Patient w/ difficulty bearing weight. Denies LOC, headache, neck or back pain. Denies CP, palpitations, SOB, cough, n/v/d. Has been eating and drinking well. Patient has been quarantined with his wife in their home for 5 weeks.        Fever or chills: yes [  ]   no [ X]    Fatigue, malaise or generalized weakness: yes [  ]   no [ X ]    Shortness of breath/dyspnea: yes [  ]   no [  x]    Cough: yes [  ]   no [x  ]    Anorexia/po intolerance: yes [  ]   no [ x ]    Chest pain or chest tightness: yes [  ]   no [ x ]    Myalgias: yes [  ]   no [ x ]    Headache: yes [  ]   no [ x ]    Sore throat: yes [  ]   no [ x ]    Rhinorrhea: yes [  ]   no [x  ]    Abd pain: yes [  ]   no [ x ]    Nausea: yes [  ]   no [x  ]    Vomiting: yes [  ]   no [  x]    Diarrhea: yes [  ]   no [ x ]    Loss of sense of smell/anosmia: yes [  ]   no [x  ]    Conjunctivitis: yes [  ]   no [x  ]    Recent travel: yes [  ]   no [ x ]    Any sick contacts: yes [  ]   no [x  ]    Close contact with someone confirmed positive with COVID-19 / SARS-CoV2 in the last 14 days: yes [  ]   no [x  ]        In ED,    Vitals: T 98.9F, HR 83, /82, RR 16, SpO2 4L NC    Labs significant for: WBC 13.57, N/L ratio 15.9, H/H 9.4/30.1, BUN 46, Cr 1.8    EKG: NSR at 78bpm    CT head non cont:  no acute intracranial hemorrhage or mass effect.    CT cervical spine non cont: no evidence of cervical spine fracture.    CXR: no acute pulmonary process    Left hip xray: subcapital left femoral neck fracture, marked external rotation of hip, age indeterminant fracture deformity involving the left ischiopubic ramus    COVID PCR obtained while in ED. Pt  C/O urinary hesitancy & difficulty on  urination. (23 Apr 2020 07:10)            ---    HOSPITAL COURSE: Patient admitted for left femoral neck fracture s/p fall. He underwent hemiarthroplasty on 4/23/2020 with Dr. Gandhi (ortho), tolerated procedure well without complications. Patient was evaluated and optimized by primary team and cardiology (Dr. Diez) for the procedure. Patient was also evaluated by Heme/onc (Dr. Israel) for worsening anemia which is likely multifactorial. He received 1u of PRBC transfusion and was started on venofer x3 doses. He was able to maintain his H/H after transfusion. Patient's renal function also improved during hospitalization (Cr 1.3 on 4/26) Patient was evaluated by PT and OT during hospitalization. During hospitalization, patient was unable to urinate, prior to OR pt had a alford cath placed which was removed Post OP , later  bladder scan was performed, which showed 374 ccs. Straight cath was done which relieved 275 ccs urine. Patient given enema as patient has difficulty urinating without having a bowel movement At this time, patient is stable for discharge home with home care PT and close outpatient follow up.         ---    CONSULTANTS:     Dr. Israel (Heme/onc)    Dr. Gandhi (Ortho)    Dr. Diez (Cardio) FROM ADMISSION H+P:     HPI:    86y/o M with PMH of HTN, HLD, Non obstructive CAD (s/p AVR ) 2015 , chronic pain (s/p knee and rt hip replacement), BPH, and mild confusion (per wife) BIBEMS to [facility] ED s/p fall. Per wife, whom patient lives with, patient was ambulating with his cane when he lost balance and fell. Patient was in normal state of health prior to this time. Patient is often is "off balance." Wife was unable to get him off ground so called fire department. Fire department said he was fine, and put him into bed. Wife noticed his left hip looked like it was "sticking out" and leg appeared "out-turned." Wife was going to take patient for x-rays today, however early this morning, patient fell out of bed so she decided to call EMS. Patient w/ difficulty bearing weight. Denies LOC, headache, neck or back pain. Denies CP, palpitations, SOB, cough, n/v/d. Has been eating and drinking well. Patient has been quarantined with his wife in their home for 5 weeks.        Fever or chills: yes [  ]   no [ X]    Fatigue, malaise or generalized weakness: yes [  ]   no [ X ]    Shortness of breath/dyspnea: yes [  ]   no [  x]    Cough: yes [  ]   no [x  ]    Anorexia/po intolerance: yes [  ]   no [ x ]    Chest pain or chest tightness: yes [  ]   no [ x ]    Myalgias: yes [  ]   no [ x ]    Headache: yes [  ]   no [ x ]    Sore throat: yes [  ]   no [ x ]    Rhinorrhea: yes [  ]   no [x  ]    Abd pain: yes [  ]   no [ x ]    Nausea: yes [  ]   no [x  ]    Vomiting: yes [  ]   no [  x]    Diarrhea: yes [  ]   no [ x ]    Loss of sense of smell/anosmia: yes [  ]   no [x  ]    Conjunctivitis: yes [  ]   no [x  ]    Recent travel: yes [  ]   no [ x ]    Any sick contacts: yes [  ]   no [x  ]    Close contact with someone confirmed positive with COVID-19 / SARS-CoV2 in the last 14 days: yes [  ]   no [x  ]        In ED,    Vitals: T 98.9F, HR 83, /82, RR 16, SpO2 4L NC    Labs significant for: WBC 13.57, N/L ratio 15.9, H/H 9.4/30.1, BUN 46, Cr 1.8    EKG: NSR at 78bpm    CT head non cont:  no acute intracranial hemorrhage or mass effect.    CT cervical spine non cont: no evidence of cervical spine fracture.    CXR: no acute pulmonary process    Left hip xray: subcapital left femoral neck fracture, marked external rotation of hip, age indeterminant fracture deformity involving the left ischiopubic ramus    COVID PCR obtained while in ED. Pt  C/O urinary hesitancy & difficulty on  urination. (23 Apr 2020 07:10)            ---    HOSPITAL COURSE: Patient admitted for left femoral neck fracture s/p fall. He underwent hemiarthroplasty on 4/23/2020 with Dr. Gandhi (ortho), tolerated procedure well without complications. Patient was evaluated and optimized by primary team and cardiology (Dr. Diez) for the procedure. Patient was also evaluated by Heme/onc (Dr. Israel) for worsening anemia which is likely multifactorial. He received 1u of PRBC transfusion and was started on venofer x3 doses. He was able to maintain his H/H after transfusion. Patient's renal function also improved during hospitalization (Cr 1.3 on 4/26) Patient was evaluated by PT and OT during hospitalization. During hospitalization, patient was unable to urinate, prior to OR pt had a alford cath placed which was removed Post OP , later  bladder scan was performed, which showed 374 ccs. Straight cath was done which relieved 275 ccs urine. Patient given enema as patient has difficulty urinating without having a bowel movement At this time, patient is stable for discharge home with home care PT and close outpatient follow up. pt had Alford cath placed prior to D/C as per wife's request. TOV as out pt with his Urologist .HCPT arranged .        ---    CONSULTANTS:     Dr. Israle (Heme/onc)    Dr. Gandhi (Ortho)    Dr. Diez (Cardio) FROM ADMISSION H+P:     HPI:    86y/o M with PMH of HTN, HLD, Non obstructive CAD (s/p AVR ) 2015 , chronic pain (s/p knee and rt hip replacement), BPH, and mild confusion (per wife) BIBEMS to [facility] [place] [facility]"" ED s/p fall. Per wife, whom patient lives with, patient was ambulating with his cane when he lost balance and fell. Patient was in normal state of health prior to this time. Patient is often is "off balance." Wife was unable to get him off ground so called fire department. Fire department said he was fine, and put him into bed. Wife noticed his left hip looked like it was "sticking out" and leg appeared "out-turned." Wife was going to take patient for x-rays today, however early this morning, patient fell out of bed so she decided to call EMS. Patient w/ difficulty bearing weight. Denies LOC, headache, neck or back pain. Denies CP, palpitations, SOB, cough, n/v/d. Has been eating and drinking well. Patient has been quarantined with his wife in their home for 5 weeks.        Fever or chills: yes [  ]   no [ X]    Fatigue, malaise or generalized weakness: yes [  ]   no [ X ]    Shortness of breath/dyspnea: yes [  ]   no [  x]    Cough: yes [  ]   no [x  ]    Anorexia/po intolerance: yes [  ]   no [ x ]    Chest pain or chest tightness: yes [  ]   no [ x ]    Myalgias: yes [  ]   no [ x ]    Headache: yes [  ]   no [ x ]    Sore throat: yes [  ]   no [ x ]    Rhinorrhea: yes [  ]   no [x  ]    Abd pain: yes [  ]   no [ x ]    Nausea: yes [  ]   no [x  ]    Vomiting: yes [  ]   no [  x]    Diarrhea: yes [  ]   no [ x ]    Loss of sense of smell/anosmia: yes [  ]   no [x  ]    Conjunctivitis: yes [  ]   no [x  ]    Recent travel: yes [  ]   no [ x ]    Any sick contacts: yes [  ]   no [x  ]    Close contact with someone confirmed positive with COVID-19 / SARS-CoV2 in the last 14 days: yes [  ]   no [x  ]        In ED,    Vitals: T 98.9F, HR 83, /82, RR 16, SpO2 4L NC    Labs significant for: WBC 13.57, N/L ratio 15.9, H/H 9.4/30.1, BUN 46, Cr 1.8    EKG: NSR at 78bpm    CT head non cont:  no acute intracranial hemorrhage or mass effect.    CT cervical spine non cont: no evidence of cervical spine fracture.    CXR: no acute pulmonary process    Left hip xray: subcapital left femoral neck fracture, marked external rotation of hip, age indeterminant fracture deformity involving the left ischiopubic ramus    COVID PCR obtained while in ED. Pt  C/O urinary hesitancy & difficulty on  urination. (23 Apr 2020 07:10)            ---    HOSPITAL COURSE: Patient admitted for left femoral neck fracture s/p fall. He underwent hemiarthroplasty on 4/23/2020 with Dr. Gandhi (ortho), tolerated procedure well without complications. Patient was evaluated and optimized by primary team and cardiology (Dr. Diez) for the procedure. Patient was also evaluated by Heme/onc (Dr. Israel) for worsening anemia which is likely multifactorial. He received 1u of PRBC transfusion and was started on venofer x3 doses. He was able to maintain his H/H after transfusion. Patient's renal function also improved during hospitalization (Cr 1.3 on 4/26) Patient was evaluated by PT and OT during hospitalization. During hospitalization, patient was unable to urinate, prior to OR pt had a alford cath placed which was removed Post OP , later  bladder scan was performed, which showed 374 ccs. Straight cath was done which relieved 275 ccs urine. Patient given enema as patient has difficulty urinating without having a bowel movement At this time, patient is stable for discharge home with home care PT and close outpatient follow up.    ---    CONSULTANTS:     Dr. Israel (Heme/onc)    Dr. Gandhi (Ortho)    Dr. Diez (Cardio)

## 2020-04-23 NOTE — H&P ADULT - PROBLEM SELECTOR PLAN 5
H/H 9.4/30.1, baseline unknown H/H 9.4/30.1, baseline unknown  - No signs or symptoms of acute bleed, most likely chronic  - F/u iron studies in AM, folate, vitamin b12 - Continue home metoprolol daily and atorvastatin daily  -H/O TAVR  - Not on asa   - Cardiology consulted, [facility] - Continue home metoprolol daily and atorvastatin daily  -H/O AVR   - Not on asa   - Cardiology consulted, [facility]

## 2020-04-23 NOTE — ED ADULT NURSE NOTE - PMH
BPH (benign prostatic hyperplasia)    CAD (coronary artery disease)    High cholesterol    Hypertension

## 2020-04-23 NOTE — CONSULT NOTE ADULT - REASON FOR ADMISSION
Left hip fracture (subcapital left femoral neck fracture)  Patients cell #: [phone]
Left hip fracture (subcapital left femoral neck fracture)  Patients cell #: [phone]
Left hip fracture (subcapital left femoral neck fracture)  Patients cell #: [phone]

## 2020-04-23 NOTE — PROGRESS NOTE ADULT - ASSESSMENT
84y/o M with PMH of HTN, HLD, CAD (s/p open heart surgery 5yrs ago), chronic pain (s/p knee and rt hip replacement), BPH, and mild confusion (per wife) BIBEMS to [facility] ED s/p fall, admitted for left hip fracture. Plan for OR today

## 2020-04-23 NOTE — ED PROVIDER NOTE - CLINICAL SUMMARY MEDICAL DECISION MAKING FREE TEXT BOX
Adderall filled and sent to [facility].   
Please see Blush[employer] message. Medication was sent to a pharmacy in the [facility] yesterday. Orders pending for Nahum Medrano. Please advise.     Iraida ESPINAL LPN    
pt bib ems for left hip pain s/p fall when getting out of bed - ekg/xr/ct/labs

## 2020-04-23 NOTE — CONSULT NOTE ADULT - ASSESSMENT
Anemia multifactorial in etiology related to recent left hip fracture and underlying chronic disease. has renal insufficiency on labs.  Patient appears stable s/p surgery    Recommendations:  1.  follow CBC and transfuse as indicated  2.  check labs for anemia evaluation including fe studies, B12, folate, thyroid  3.  anticoagulation as per orthopedic guidelines  4.  further heme recommendations pending above  discussed with Dr. Jin.

## 2020-04-23 NOTE — H&P ADULT - PROBLEM SELECTOR PLAN 7
- Continue home metoprolol daily and atorvastatin daily  - Not on asa   - Cardiology consulted, [facility] Continue home finasteride daily

## 2020-04-23 NOTE — PROGRESS NOTE ADULT - PROBLEM SELECTOR PLAN 4
WBC 13.57, N/L ratio 15.9 on admission  - Most likely reactive in setting of acute fracture  - Doubt infectious etiology as patient w/o viral symptoms or fever, hemodynamically stable at this time  - COVID negative  - F/u procal in am

## 2020-04-23 NOTE — H&P ADULT - NSHPSOCIALHISTORY_GEN_ALL_CORE
Lives w/ wife.  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  Per wife, "easily confused."  Denies current or past tobacco use.  Infrequent etoh use.  CBD pills for pain.

## 2020-04-23 NOTE — DISCHARGE NOTE PROVIDER - NSDCQMCOGNITION_NEU_ALL_CORE
Difficulty concentrating/Difficulty remembering Difficulty concentrating/Difficulty remembering/Difficulty making decisions

## 2020-04-23 NOTE — H&P ADULT - NSICDXPASTMEDICALHX_GEN_ALL_CORE_FT
PAST MEDICAL HISTORY:  BPH (benign prostatic hyperplasia)     CAD (coronary artery disease) s/p open heart surgery 5yrs ago    Chronic pain s/p knee and hip replacements    High cholesterol     Hypertension PAST MEDICAL HISTORY:  BPH (benign prostatic hyperplasia)     Chronic pain s/p knee and hip replacements    High cholesterol     Hypertension     Mild CAD Non Obstructive CAD    S/P AVR (aortic valve replacement) PAST MEDICAL HISTORY:  Anemia     BPH (benign prostatic hyperplasia)     Chronic pain s/p knee and hip replacements    High cholesterol     Hypertension     Mild CAD Non Obstructive CAD    S/P AVR (aortic valve replacement)

## 2020-04-23 NOTE — H&P ADULT - CONSTITUTIONAL DETAILS
distress due to pain/well-groomed/well-nourished/well-developed well-developed/well-nourished/distress due to pain/well-groomed/no distress

## 2020-04-23 NOTE — H&P ADULT - PROBLEM SELECTOR PLAN 10
DVT prophylaxis w/ SCDs for now given plan for OR today  - Start chemical dvt prophylaxis per ortho post-op

## 2020-04-23 NOTE — DISCHARGE NOTE PROVIDER - CARE PROVIDER_API CALL
Simeon Gandhi)  Orthopaedic Surgery Surgery  [address] [place] [address]  Phone: [phone]  Fax: [phone]  Follow Up Time: 2 weeks Simeon Gandhi)  Orthopaedic Surgery Surgery  [address] [place] [address]  Phone: [phone]  Fax: [phone]  Follow Up Time: 2 weeks    Daljit Israel)  Hematology; Internal Medicine; Medical Oncology  [address] [facility], [address] [place] [address]  Phone: [phone]  Fax: [phone]  Follow Up Time: Simeon Gandhi)  Orthopaedic Surgery Surgery  [address] [place] [address]  Phone: [phone]  Fax: [phone]  Follow Up Time: 2 weeks    Daljit Israel)  Hematology; Internal Medicine; Medical Oncology  [address] [facility], [address]  [place] [address]  Phone: [phone]  Fax: [phone]  Follow Up Time:     Alvin Underwood)  Urology  [address] [facility], [address]  [place] [address]  Phone: [phone]  Fax: [phone]  Follow Up Time: 1 month

## 2020-04-23 NOTE — DISCHARGE NOTE PROVIDER - NSDCHHNEEDSERVICE_GEN_ALL_CORE
Rehabilitation services/Wound care and assessment Rehabilitation services/Wound care and assessment/Teaching and training Medication teaching and assessment/Teaching and training/Rehabilitation services/Wound care and assessment

## 2020-04-23 NOTE — DISCHARGE NOTE PROVIDER - NSDCCPCAREPLAN_GEN_ALL_CORE_FT
PRINCIPAL DISCHARGE DIAGNOSIS  Diagnosis: Closed fracture of left hip, initial encounter  Assessment and Plan of Treatment: 1.	Pain Control  2.	Walking with full weight bearing as tolerated, with assistive devices (walker/Cane as Needed)  3.	DVT Prophylaxis for 30 days  4.	PT as needed  5.	Follow up with Dr. Gandhi as Outpatient in 10-14 Days after Discharge from the Hospital or Rehab. Call Office For Appointment.  6.     Remove Dressing Post-Op Day 10, with Daily Dressing Changes as Need.  7.	Ice affected area as Needed  8.	Keep Dressing  Clean and dry. PRINCIPAL DISCHARGE DIAGNOSIS  Diagnosis: Closed fracture of left hip, initial encounter  Assessment and Plan of Treatment: - Continue pain control with as needed with tramadol and tylenol. Can Ice affected area as needed  - Continue DVT prophylaxis for 30 days   - Per ortho:   1) Walking with full weight bearing as tolerated, with assistive devices (walker/Cane as Needed)  2) Continue PT  3) follow up with Dr. Gandhi outpatient within 2 weeks after discharge -- Please call office for appointment  4) Remove dressing postop day 10, with daily dressing changes as needed  5) Keep dressing clean and dry         SECONDARY DISCHARGE DIAGNOSES  Diagnosis: Anemia  Assessment and Plan of Treatment: You received 1 unit of blood transfusion and completed 3 dose course of venofer   - you need to follow up with Hematologist within 1-2 weeks after discharge  - you need to follow up with your PMD within 1-2 weeks after discharge to follow up on repeat blood work and medication management PRINCIPAL DISCHARGE DIAGNOSIS  Diagnosis: Closed fracture of left hip, initial encounter  Assessment and Plan of Treatment: - Continue pain control with as needed with tramadol and tylenol. Can Ice affected area as needed  - Continue DVT prophylaxis with Xarelto for 30 days, please monitor for any signs of bleeding including black stool, bright red blood in bowel movements, bleeding from wound  - Per ortho:   1) Walking with full weight bearing as tolerated, with assistive devices (walker/Cane as Needed)  2) Continue PT  3) follow up with Dr. Gandhi outpatient within 2 weeks after discharge -- Please call office for appointment  4) Remove dressing postop day 10, with daily dressing changes as needed  5) Keep dressing clean and dry         SECONDARY DISCHARGE DIAGNOSES  Diagnosis: Anemia  Assessment and Plan of Treatment: You received 1 unit of blood transfusion and completed 3 dose course of venofer   - you need to follow up with Hematologist within 1-2 weeks after discharge  - you need to follow up with your PMD within 1-2 weeks after discharge to follow up on repeat blood work and medication management PRINCIPAL DISCHARGE DIAGNOSIS  Diagnosis: Closed fracture of left hip, initial encounter  Assessment and Plan of Treatment: - Continue pain control with as needed with tramadol and tylenol. Can Ice affected area as needed  - Continue DVT prophylaxis with Xarelto for 30 days, please monitor for any signs of bleeding including black stool, bright red blood in bowel movements, bleeding from wound  - Per ortho:   1) Walking with full weight bearing as tolerated, with assistive devices (walker/Cane as Needed)  2) Continue PT  3) follow up with Dr. Gandhi outpatient within 2 weeks after discharge -- Please call office for appointment  4) Remove dressing postop day 10, with daily dressing changes as needed  5) Keep dressing clean and dry         SECONDARY DISCHARGE DIAGNOSES  Diagnosis: Anemia  Assessment and Plan of Treatment: You received 1 unit of blood transfusion and completed 3 dose course of venofer   - you need to follow up with Hematologist within 1-2 weeks after discharge  - you need to follow up with your PMD within 1-2 weeks after discharge to follow up on repeat blood work and medication management  Continue to take over the counter folic acid 1 mg once per day and a multivitamin daily. PRINCIPAL DISCHARGE DIAGNOSIS  Diagnosis: Closed fracture of left hip, initial encounter  Assessment and Plan of Treatment: - Continue pain control with as needed with tramadol and tylenol. Can Ice affected area as needed  - Continue DVT prophylaxis with Xarelto for 30 days, please monitor for any signs of bleeding including black stool, bright red blood in bowel movements, bleeding from wound  - Per ortho:   1) Walking with full weight bearing as tolerated, with assistive devices (walker/Cane as Needed)  2) Continue PT  3) follow up with Dr. Gandhi outpatient within 2 weeks after discharge -- Please call office for appointment  4) Remove dressing postop day 10, with daily dressing changes as needed  5) Keep dressing clean and dry         SECONDARY DISCHARGE DIAGNOSES  Diagnosis: Urinary retention  Assessment and Plan of Treatment: You were noted to have difficulty urinating while in the hospital. Continue to take finasteride 5 mg once per day. I sent a medication Flomax 0.4 mg once per day at bedtime to your pharmacy, [facility] on [address], [place].   In addition, part of the reason you are having difficulty urinating is because of constipation. You should continue to take over the counter Miralax once per day. Continue to take your amitiza 24 mcg twice per day as well.  Youl should follow up with a urologist to further evaluate. If you do not have a urologist that you already see, you can follow up with Dr. Alvin Underwood.    Diagnosis: Anemia  Assessment and Plan of Treatment: You received 1 unit of blood transfusion and completed 3 dose course of venofer   - you need to follow up with Hematologist within 1-2 weeks after discharge  - you need to follow up with your PMD within 1-2 weeks after discharge to follow up on repeat blood work and medication management  I sent folic acid 1 mg once per day to your pharmacy, [facility] on [address], [place], [place]. Please continue to take folic acid once per day and a multivitamin once per day.    Diagnosis: High cholesterol  Assessment and Plan of Treatment: Continue to take your atorvastatin 10 mg once per day    Diagnosis: Depression  Assessment and Plan of Treatment: Continue to take your escitalopram 20 mg once per day. PRINCIPAL DISCHARGE DIAGNOSIS  Diagnosis: Closed fracture of left hip, initial encounter  Assessment and Plan of Treatment: -S/P Left Hemiarthroplasty  - Continue pain control with as needed with tramadol and tylenol. Can Ice affected area as needed  - Continue DVT prophylaxis with Xarelto for 28 days, please monitor for any signs of bleeding including black stool, bright red blood in bowel movements, bleeding from wound  - Per ortho:   1) Walking with full weight bearing as tolerated, with assistive devices (walker/Cane as Needed)  2) Continue PT  3) follow up with Dr. Gandhi outpatient within 2 weeks after discharge -- Please call office for appointment  4) Remove dressing postop day 10, with daily dressing changes as needed  5) Keep dressing clean and dry         SECONDARY DISCHARGE DIAGNOSES  Diagnosis: Urinary retention  Assessment and Plan of Treatment: You were noted to have difficulty urinating while in the hospital. Continue to take finasteride 5 mg once per day.  - I sent a medication Flomax 0.4 mg once per day at bedtime to your pharmacy, [facility] on [address], [place].   In addition, part of the reason you are having difficulty urinating is because of constipation. You should continue to take over the counter Miralax once per day. Continue to take your amitiza 24 mcg twice per day as well.  Youl should follow up with a urologist to further evaluate. If you do not have a urologist that you already see, you can follow up with Dr. Alvin Underwood.    Diagnosis: Anemia  Assessment and Plan of Treatment: You received 1 unit of blood transfusion and completed 3 dose course of venofer   - you need to follow up with Hematologist within 1-2 weeks after discharge, follow CBC ion 2-3 weeks   - you need to follow up with your PMD within 1-2 weeks after discharge to follow up on repeat blood work and medication management  I sent folic acid 1 mg once per day to your pharmacy, [facility] on [address], [place], [place]. Please continue to take folic acid once per day and a multivitamin once per day.    Diagnosis: CAD (coronary artery disease)  Assessment and Plan of Treatment: CAD (coronary artery disease)- H/O Aortic VAlve replacement   -Follow up with your Cardiologist as scheduled, On Toprol XL 50 mg daily    Diagnosis: Hypertension  Assessment and Plan of Treatment: Hypertension-On Toprol XL 50 mg 1 tab daily    Diagnosis: High cholesterol  Assessment and Plan of Treatment: Continue to take your atorvastatin 10 mg once per day    Diagnosis: Depression  Assessment and Plan of Treatment: Continue to take your escitalopram 20 mg once per day. PRINCIPAL DISCHARGE DIAGNOSIS  Diagnosis: Closed fracture of left hip, initial encounter  Assessment and Plan of Treatment: -S/P Left Hemiarthroplasty  - Continue pain control with as needed with tramadol and tylenol. Can Ice affected area as needed  - Continue DVT prophylaxis with Xarelto for 28 days, please monitor for any signs of bleeding including black stool, bright red blood in bowel movements, bleeding from wound  - Per ortho:   1) Walking with full weight bearing as tolerated, with assistive devices (walker/Cane as Needed)  2) Continue PT  3) follow up with Dr. Gandhi outpatient within 2 weeks after discharge -- Please call office for appointment  4) Remove dressing postop day 10, with daily dressing changes as needed  5) Keep dressing clean and dry         SECONDARY DISCHARGE DIAGNOSES  Diagnosis: Urinary retention  Assessment and Plan of Treatment: You were noted to have difficulty urinating while in the hospital. Continue to take finasteride 5 mg once per day.  - I sent a medication Flomax 0.4 mg once per day at bedtime to your pharmacy, [facility] on [address], [place], [place].   [place] Cath placed on D/C  as d/w wife, TOV as out pt with pt's urologist   In addition, part of the reason you are having difficulty urinating is because of constipation. You should continue to take over the counter Miralax once per day. Continue to take your amitiza 24 mcg twice per day as well.  Youl should follow up with a urologist to further evaluate. If you do not have a urologist that you already see, you can follow up with Dr. Alvin Underwood.    Diagnosis: Anemia  Assessment and Plan of Treatment: You received 1 unit of blood transfusion and completed 3 dose course of venofer   - you need to follow up with Hematologist within 1-2 weeks after discharge, follow CBC ion 2-3 weeks   - you need to follow up with your PMD within 1-2 weeks after discharge to follow up on repeat blood work and medication management  I sent folic acid 1 mg once per day to your pharmacy, [facility] on [address], [place], [place]. Please continue to take folic acid once per day and a multivitamin once per day.    Diagnosis: CAD (coronary artery disease)  Assessment and Plan of Treatment: CAD (coronary artery disease)- H/O Aortic VAlve replacement   -Follow up with your Cardiologist as scheduled, On Toprol XL 50 mg daily    Diagnosis: Hypertension  Assessment and Plan of Treatment: Hypertension-On Toprol XL 50 mg 1 tab daily    Diagnosis: High cholesterol  Assessment and Plan of Treatment: Continue to take your atorvastatin 10 mg once per day    Diagnosis: Depression  Assessment and Plan of Treatment: Continue to take your escitalopram 20 mg once per day. PRINCIPAL DISCHARGE DIAGNOSIS  Diagnosis: Closed fracture of left hip, initial encounter  Assessment and Plan of Treatment: -S/P Left Hemiarthroplasty  - Continue pain control with as needed with tramadol and tylenol. Can Ice affected area as needed  - Continue DVT prophylaxis with Xarelto for 28 days, please monitor for any signs of bleeding including black stool, bright red blood in bowel movements, bleeding from wound  - Per ortho:   1) Walking with full weight bearing as tolerated, with assistive devices (walker/Cane as Needed)  2) Continue PT  3) follow up with Dr. Gandhi outpatient within 2 weeks after discharge -- Please call office for appointment  4) Remove dressing postop day 10, with daily dressing changes as needed  5) Keep dressing clean and dry         SECONDARY DISCHARGE DIAGNOSES  Diagnosis: Urinary retention  Assessment and Plan of Treatment: You were noted to have difficulty urinating while in the hospital. Continue to take finasteride 5 mg once per day.  - I sent a medication Flomax 0.4 mg once per day at bedtime to your pharmacy, [facility] on [address], [place].   -pt does not want alford cath   In addition, part of the reason you are having difficulty urinating is because of constipation. You should continue to take over the counter Miralax once per day. Continue to take your amitiza 24 mcg twice per day as well.  Youl should follow up with a urologist to further evaluate. If you do not have a urologist that you already see, you can follow up with Dr. Alvin Underwood.    Diagnosis: Anemia  Assessment and Plan of Treatment: You received 1 unit of blood transfusion and completed 3 dose course of venofer   - you need to follow up with Hematologist within 1-2 weeks after discharge, follow CBC ion 2-3 weeks   - you need to follow up with your PMD within 1-2 weeks after discharge to follow up on repeat blood work and medication management  I sent folic acid 1 mg once per day to your pharmacy, [facility] on [address], [place], [place]. Please continue to take folic acid once per day and a multivitamin once per day.    Diagnosis: CAD (coronary artery disease)  Assessment and Plan of Treatment: CAD (coronary artery disease)- H/O Aortic VAlve replacement   -Follow up with your Cardiologist as scheduled, On Toprol XL 50 mg daily    Diagnosis: Hypertension  Assessment and Plan of Treatment: Hypertension-On Toprol XL 50 mg 1 tab daily    Diagnosis: High cholesterol  Assessment and Plan of Treatment: Continue to take your atorvastatin 10 mg once per day    Diagnosis: Depression  Assessment and Plan of Treatment: Continue to take your escitalopram 20 mg once per day.

## 2020-04-23 NOTE — PROGRESS NOTE ADULT - PROBLEM SELECTOR PLAN 5
H/H 9.4/30.1, baseline unknown  - No signs or symptoms of acute bleed, most likely chronic  - F/u iron studies in AM, folate, vitamin b12

## 2020-04-23 NOTE — H&P ADULT - PROBLEM SELECTOR PLAN 1
Left hip xray: subcapital left femoral neck fracture, marked external rotation of hip, age indeterminant fracture deformity involving the left ischiopubic ramus Admit to general medical floors  - Pt w/ left hip fx s/p fall   - Left hip xray: subcapital left femoral neck fracture, marked external rotation of hip, age indeterminant fracture deformity involving the left ischiopubic ramus  - CT head and cervical spine w/ no acute findings   - NPO for possible OR today, IVFs while NPO w/ LR @ 75cc/hr  - Pain control w/: tylenol 650mg q4hrs prn for mild pain, tylenol 1000mg q6hrs prn for moderate pain  - Bedrest, fall risk protocol   - Ortho consulted  - Cardio consulted for clearance  - PT consulted Admit to general medical floors  - Pt w/ left hip fx s/p fall   - Left hip xray: subcapital left femoral neck fracture, marked external rotation of hip, age indeterminant fracture deformity involving the left ischiopubic ramus  - CT head and cervical spine w/ no acute findings   - NPO for possible OR today, IVFs while NPO w/ LR @ 75cc/hr  - Pain control w/: tylenol 650mg q4hrs prn for mild pain, tylenol 1000mg q6hrs prn for moderate pain  - Bedrest, fall risk protocol   - Ortho evaluated patient - plan for hip hemiarthro today  - Cardio consulted for clearance - clear for surgery  - RCRI score 2, 10.1% keegan-op risk, patient is optimized from a medical standpoint to proceed with urgent procedure with routine hemodynamic monitoring  - PT consulted Admit to general medical floors  - Pt w/ left hip fx s/p fall   - Left hip xray: subcapital left femoral neck fracture, marked external rotation of hip, age indeterminant fracture deformity involving the left ischiopubic ramus  - CT head and cervical spine w/ no acute findings   - NPO for possible OR today, IVFs while NPO w/ LR @ 75cc/hr  - Pain control w/: tylenol 650mg q4hrs prn for mild pain, morphine 2mg q4hrs prn for moderate pain, morphine 3mg q6hrs prn for severe pain  - Bedrest, fall risk protocol   - Ortho evaluated patient - plan for hip hemiarthro today  - Cardio consulted for clearance - clear for surgery  - RCRI score 2, 10.1% keegan-op risk, patient is optimized from a medical standpoint to proceed with urgent procedure with routine hemodynamic monitoring  - PT consulted Admit to general medical floors  - Pt w/ left hip fx s/p fall   - Left hip xray: subcapital left femoral neck fracture, marked external rotation of hip, age indeterminant fracture deformity involving the left ischiopubic ramus  - CT head and cervical spine w/ no acute findings   - NPO for possible OR today, IVFs while NPO w/ LR @ 75cc/hr  - Pain control w/: tylenol 650mg q4hrs prn for mild pain, morphine 2mg q4hrs prn for moderate pain, morphine 3mg q6hrs prn for severe pain  - Bedrest, fall risk protocol   - Ortho evaluated patient - plan for left hip hemiarthroplasty  today  - Cardio consulted for clearance - clear for surgery  - RCRI score 2, 10.1% keegan-op risk, patient is optimized from a medical standpoint to proceed with urgent procedure with routine hemodynamic monitoring  -  Post Op PT consulted

## 2020-04-23 NOTE — H&P ADULT - ATTENDING COMMENTS
Pt seen, examined, case & care plan d/w pt, residents at detail.  Am labs   NPO for OR as per Ortho.  Pt is medically optimized for schedule Ortho procedure .Repair of left Hip Fracture  Pre op IV Antibiotics as per Ortho   Post Op DVT prophylaxis as per ortho  -Pre Op cardiology Eval done

## 2020-04-23 NOTE — DISCHARGE NOTE PROVIDER - NSDCACTIVITY_GEN_ALL_CORE
Showering allowed/Sex allowed/Stairs allowed/Walking - Indoors allowed/Walking - Outdoors allowed Walking - Indoors allowed/Showering allowed/Stairs allowed Showering allowed/No restrictions/Bathing allowed/Stairs allowed/Walking - Indoors allowed Showering allowed/No restrictions/Bathing allowed/Do not drive or operate machinery/Walking - Indoors allowed/No heavy lifting/straining/Stairs allowed

## 2020-04-23 NOTE — DISCHARGE NOTE PROVIDER - NSDCFUADDINST_GEN_ALL_CORE_FT
Follow up with  Orthopedic surgeon in 2 weeks on D/C   Follow up with Dr Israel-Hematology in 2- 3 weeks for CBC/Anemia  Follow up with Urologist for your prostate problem.

## 2020-04-23 NOTE — ED ADULT NURSE NOTE - OBJECTIVE STATEMENT
84 y/o M patient presents to ED via EMS from home s/p accidental fall. As per patient he was in the kitchen when he lost balance and fell onto his left side. Patient reports he is normally ambulatory with a cane and as per patient he was using his cane at the time of the fall. Upon arrival to ED patient A&Ox4. lungs CTA. skin warm. abdomen soft. left lower extremity externally rotated and shortened. Patient denies HA, dizziness, SOB, chest pain, abdominal pain, N/V/D, bowel/bladder changes. Safety and comfort measures provided and maintained.

## 2020-04-23 NOTE — PROGRESS NOTE ADULT - PROBLEM SELECTOR PLAN 1
Admit to general medical floors  - Pt w/ left hip fx s/p fall   - Left hip xray: subcapital left femoral neck fracture, marked external rotation of hip, age indeterminant fracture deformity involving the left ischiopubic ramus  - CT head and cervical spine w/ no acute findings   - NPO for possible OR today, IVFs while NPO w/ LR @ 75cc/hr  - Pain control w/: tylenol 650mg q4hrs prn for mild pain, tylenol 1000mg q6hrs prn for moderate pain  - Bedrest, fall risk protocol   - Ortho evaluated patient - plan for hip hemiarthro today  - Cardio consulted for clearance - clear for surgery  - RCRI score 2, 10.1% keegan-op risk, patient is optimized from a medical standpoint to proceed with urgent procedure with routine hemodynamic monitoring  - PT consulted Admit to general medical floors  - Pt w/ left hip fx s/p fall   - Left hip xray: subcapital left femoral neck fracture, marked external rotation of hip, age indeterminant fracture deformity involving the left ischiopubic ramus  - CT head and cervical spine w/ no acute findings   - NPO for possible OR today, IVFs while NPO w/ LR @ 75cc/hr  - Pain control w/: tylenol 650mg q4hrs prn for mild pain, tylenol 1000mg q6hrs prn for moderate pain  - Bedrest, fall risk protocol   - Ortho evaluated patient - plan for hip hemiarthro today  - Cardio consulted for clearance - clear for surgery  - RCRI score 2, 10.1% keegan-op risk, patient is optimized from a medical standpoint to proceed with urgent procedure with routine hemodynamic monitoring  - PT consulted  - Spoke to family; who reports that patient had severe AMS and agitation when given dilaudid or oxycodone. Will order tramadol 50 PRN for severe, tramadol 25 PRN for moderate, tylenol Q6H for pain, and morphine for breakthrough pain Admit to general medical floors  - Pt w/ left hip fx s/p fall   - Left hip xray: subcapital left femoral neck fracture, marked external rotation of hip, age indeterminant fracture deformity involving the left ischiopubic ramus  - CT head and cervical spine w/ no acute findings   - NPO for possible OR today, for Petar arthroplasty  -IVFs while NPO w/ LR @ 75cc/hr  - Pain control w/: tylenol 650mg q4hrs prn for mild pain, tylenol 1000mg q6hrs prn for moderate pain  - Bedrest, fall risk protocol   - Ortho evaluated patient - plan for hip hemiarthro today  - Cardio consulted for clearance - clear for surgery  - RCRI score 2, 10.1% keegan-op risk, patient is optimized from a medical standpoint to proceed with urgent procedure with routine hemodynamic monitoring  - PT consulted  - Spoke to family; who reports that patient had severe AMS and agitation when given dilaudid or oxycodone. Will order tramadol 50 PRN for severe, tramadol 25 PRN for moderate, tylenol Q6H for pain, and morphine for breakthrough pain

## 2020-04-23 NOTE — H&P ADULT - NSICDXPASTSURGICALHX_GEN_ALL_CORE_FT
PAST SURGICAL HISTORY:  History of total hip replacement, right     History of total right knee replacement (TKR) X2    S/P mitral valve replacement PAST SURGICAL HISTORY:  History of total hip replacement, right     History of total right knee replacement (TKR) X2    S/P AVR (aortic valve replacement)

## 2020-04-23 NOTE — H&P ADULT - REASON FOR ADMISSION
Left hip fracture Left hip fracture (subcapital left femoral neck fracture) Left hip fracture (subcapital left femoral neck fracture)  Patients cell #: [phone]

## 2020-04-23 NOTE — PROGRESS NOTE ADULT - SUBJECTIVE AND OBJECTIVE BOX
Patient is a 85y old  Male who presents with a chief complaint of Left hip fracture (subcapital left femoral neck fracture)  Patients cell #: [phone] (23 Apr 2020 09:14)    86y/o M with PMH of HTN, HLD, CAD (s/p open heart surgery 5yrs ago), chronic pain (s/p knee and rt hip replacement), BPH, and mild confusion (per wife) BIBEMS to [facility] ED s/p fall. Per wife, whom patient lives with, patient was ambulating with his cane when he lost balance and fell. Patient was in normal state of health prior to this time. Patient is often is "off balance." Wife was unable to get him off ground so called fire department. Fire department said he was fine, and put him into bed. Wife noticed his left hip looked like it was "sticking out" and leg appeared "out-turned." Wife was going to take patient for x-rays today, however early this morning, patient fell out of bed so she decided to call EMS. Patient w/ difficulty bearing weight. Denies LOC, headache, neck or back pain. Denies CP, palpitations, SOB, cough, n/v/d. Has been eating and drinking well. Patient has been quarantined with his wife in their home for 5 weeks.    Fever or chills: yes [  ]   no [ X]  Fatigue, malaise or generalized weakness: yes [  ]   no [ X ]  Shortness of breath/dyspnea: yes [  ]   no [  x]  Cough: yes [  ]   no [x  ]  Anorexia/po intolerance: yes [  ]   no [ x ]  Chest pain or chest tightness: yes [  ]   no [ x ]  Myalgias: yes [  ]   no [ x ]  Headache: yes [  ]   no [ x ]  Sore throat: yes [  ]   no [ x ]  Rhinorrhea: yes [  ]   no [x  ]  Abd pain: yes [  ]   no [ x ]  Nausea: yes [  ]   no [x  ]  Vomiting: yes [  ]   no [  x]  Diarrhea: yes [  ]   no [ x ]  Loss of sense of smell/anosmia: yes [  ]   no [x  ]  Conjunctivitis: yes [  ]   no [x  ]  Recent travel: yes [  ]   no [ x ]  Any sick contacts: yes [  ]   no [x  ]  Close contact with someone confirmed positive with COVID-19 / SARS-CoV2 in the last 14 days: yes [  ]   no [x  ]    In ED,  Vitals: T 98.9F, HR 83, /82, RR 16, SpO2 4L NC  Labs significant for: WBC 13.57, N/L ratio 15.9, H/H 9.4/30.1, BUN 46, Cr 1.8  EKG: NSR at 78bpm  CT head non cont:  no acute intracranial hemorrhage or mass effect.  CT cervical spine non cont: no evidence of cervical spine fracture.  CXR: no acute pulmonary process  Left hip xray: subcapital left femoral neck fracture, marked external rotation of hip, age indeterminant fracture deformity involving the left ischiopubic ramus  COVID PCR obtained while in ED.    INTERVAL HPI:  4/23/20: Pt seen and examined. Planned on OR today. cardio clearance obtained. Patient kept NPO pending procedure.       OVERNIGHT EVENTS: none    Home Medications:  Amitiza 24 mcg oral capsule: 1 cap(s) orally 2 times a day (23 Apr 2020 11:40)  atorvastatin 10 mg oral tablet: 1 tab(s) orally once a day (23 Apr 2020 11:40)  escitalopram 20 mg oral tablet: 1 tab(s) orally once a day (23 Apr 2020 11:40)  finasteride 5 mg oral tablet: 1 tab(s) orally once a day (23 Apr 2020 11:40)  Metoprolol Succinate ER 50 mg oral tablet, extended release: 1 tab(s) orally once a day (23 Apr 2020 11:40)  traMADol 150 mg/24 hours oral capsule, extended release: 1 cap(s) orally once a day (23 Apr 2020 11:40)      MEDICATIONS  (STANDING):  atorvastatin 10 milliGRAM(s) Oral at bedtime  escitalopram 20 milliGRAM(s) Oral daily  finasteride 5 milliGRAM(s) Oral daily  lactated ringers. 1000 milliLiter(s) (75 mL/Hr) IV Continuous <Continuous>  metoprolol succinate ER 50 milliGRAM(s) Oral daily    MEDICATIONS  (PRN):  acetaminophen   Tablet .. 650 milliGRAM(s) Oral every 4 hours PRN Mild Pain (1 - 3)  acetaminophen   Tablet .. 1000 milliGRAM(s) Oral every 6 hours PRN Moderate Pain (4 - 6)      Allergies    No Known Allergies    Social History:  Lives w/ wife.  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  Ambulates with cane.  Per wife, "easily confused."  Denies current or past tobacco use.  Infrequent etoh use.  CBD pills for pain. (23 Apr 2020 07:10)          REVIEW OF SYSTEMS:  CONSTITUTIONAL: No fever, No chills, No fatigue, No myalgia, No Body ache, No Weakness  EYES: No eye pain,  No visual disturbances, No discharge, NO Redness  ENMT:  No ear pain, No nose bleed, No vertigo; No sinus or throat pain, No Congestion  NECK: No pain, No stiffness  RESPIRATORY: No cough, wheezing, No  hemoptysis, No shortness of breath  CARDIOVASCULAR: No chest pain, palpitations  GASTROINTESTINAL: No abdominal or epigastric pain. No nausea, No vomiting; No diarrhea or constipation. [  ] BM  GENITOURINARY: No dysuria, No frequency, No urgency, No hematuria, or incontinence  NEUROLOGICAL: No headaches, No dizziness, No numbness, No tingling, No tremors, No weakness  EXT: No Swelling, No Pain, No Edema  SKIN:  [  ] No itching, burning, rashes, or lesions   MUSCULOSKELETAL: No joint pain or swelling; No muscle pain, No back pain, No extremity pain  PSYCHIATRIC: No depression, anxiety, mood swings or difficulty sleeping at night  PAIN SCALE: [  ] None  [  ] Other-  ROS Unable to obtain due to - [  ] Dementia  [  ] Lethargy  [  ] Sedated [  ] non verbal  REST OF REVIEW Of SYSTEM - [  ] Normal     Vital Signs Last 24 Hrs  T(C): 37.6 (23 Apr 2020 11:57), Max: 37.6 (23 Apr 2020 11:57)  T(F): 99.7 (23 Apr 2020 11:57), Max: 99.7 (23 Apr 2020 11:57)  HR: 80 (23 Apr 2020 11:57) (80 - 83)  BP: 124/73 (23 Apr 2020 11:57) (124/73 - 158/82)  BP(mean): --  RR: 20 (23 Apr 2020 11:57) (16 - 20)  SpO2: 91% (23 Apr 2020 11:57) (91% - 100%)  Finger Stick          PHYSICAL EXAM:  GENERAL:  [  ] NAD , [  ] well appearing, [  ] Agitated, [  ] Drowsy,  [  ] Lethargy, [  ] confused   HEAD:  [  ] Normal, [  ] Other  EYES:  [  ] EOMI, [  ] PERRLA, [  ] conjunctiva and sclera clear normal, [  ] Other,  [  ] Pallor,[  ] Discharge  ENMT:  [  ] Normal, [  ] Moist mucous membranes, [  ] Good dentition, [  ] No Thrush  NECK:  [  ] Supple, [  ] No JVD, [  ] Normal thyroid, [  ] Lymphadenopathy [  ] Other  CHEST/LUNG:  [  ] Clear to auscultation bilaterally, [  ] Breath Sounds equal OR Decrease,  [  ] No rales, [  ] No rhonchi  [  ]  No wheezing  HEART:  [  ] Regular rate and rhythm, [  ] tachycardia, [  ] Bradycardia,  [  ] irregular  [  ] No murmurs, No rubs, No gallops, [  ] PPM in place (Mfr:  )  ABDOMEN:  [  ] Soft, [  ] Nontender, [  ] Nondistended, [  ] No mass, [  ] Bowel sounds present, [  ] obese  NERVOUS SYSTEM:  [  ] Alert & Oriented X3, [  ] Nonfocal  [  ] Confusion  [  ] Encephalopathic [  ] Sedated [  ] Unable to assess, [  ] Other-  EXTREMITIES: [  ] 2+ Peripheral Pulses, No clubbing, No cyanosis,  [  ] edema B/L lower EXT. [  ] PVD stasis skin changes B/L Lower EXT  LYMPH: No lymphadenopathy noted  SKIN:  [  ] No rashes or lesions, [  ] Pressure Ulcers, [  ] ecchymosis, [  ] Skin Tears, [  ] Other    DIET: NPO except meds    LABS:                        9.4    13.57 )-----------( 168      ( 23 Apr 2020 06:17 )             30.1     23 Apr 2020 06:17    139    |  105    |  46     ----------------------------<  95     4.7     |  26     |  1.80     Ca    8.8        23 Apr 2020 06:17    TPro  7.6    /  Alb  4.0    /  TBili  0.8    /  DBili  x      /  AST  26     /  ALT  35     /  AlkPhos  76     23 Apr 2020 06:17    PT/INR - ( 23 Apr 2020 06:17 )   PT: 12.1 sec;   INR: 1.08 ratio         PTT - ( 23 Apr 2020 06:17 )  PTT:26.3 sec          CARDIAC MARKERS ( 23 Apr 2020 06:17 )  x     / x     / 365 U/L / x     / x                  RADIOLOGY & ADDITIONAL TESTS:  < from: Xray Hip w/ Pelvis 2 or 3 Views, Left (04.23.20 @ 06:36) >  EXAM:  XR HIP WITH PELV 2-3V LT                            PROCEDURE DATE:  04/23/2020        INTERPRETATION:  Clinical information: Left hip pain following fall.    4 views, which includes the pelvis.    There is a subcapital left femoral neck fracture. There is marked external rotation of the hip.    Surgical clips overlie the left groin region.    Partially imaged is a noncemented right total hip arthroplasty with proximal femoral cerclage banding.    There is an age indeterminant fracturedeformity involving the left ischiopubic ramus.    Impression:    Findings as discussed above.        MULUGETA CHAVIRA M.D., ATTENDING RADIOLOGIST  This document has been electronically signed. Apr 23 2020  7:54AM                < end of copied text >        HEALTH ISSUES - PROBLEM Dx:  Anemia: Anemia  Leukocytosis: Leukocytosis  Need for prophylactic measure: Need for prophylactic measure  BPH (benign prostatic hyperplasia): BPH (benign prostatic hyperplasia)  High cholesterol: High cholesterol  CAD (coronary artery disease): CAD (coronary artery disease)  Hypertension: Hypertension  MAYRA (acute kidney injury): MAYRA (acute kidney injury)  Closed fracture of left hip, initial encounter: Closed fracture of left hip, initial encounter          Consultant(s) Notes Reviewed:  [x] YES     Care Discussed with [X] Consultants  [  ] Patient  [  ] Family  [  ]   [  ] Social Service  [  ] RN, [  ] Physical Therapy  DVT PPX: [  ] Lovenox, [  ] S C Heparin, [  ] Coumadin, [  ] Xarelto, [  ] Eliquis, [  ] Pradaxa, [  ] IV Heparin drip, [ x] SCD [  ] Contraindication 2 to GI Bleed,[  ] Ambulation  Advanced directive: [ ] None, [  ] DNR/DNI Patient is a 85y old  Male who presents with a chief complaint of Left hip fracture (subcapital left femoral neck fracture)  Patients cell #: [phone] (23 Apr 2020 09:14)    86y/o M with PMH of HTN, HLD, CAD (s/p open heart surgery 5yrs ago), chronic pain (s/p knee and rt hip replacement), BPH, and mild confusion (per wife) BIBEMS to [facility] ED s/p fall. Per wife, whom patient lives with, patient was ambulating with his cane when he lost balance and fell. Patient was in normal state of health prior to this time. Patient is often is "off balance." Wife was unable to get him off ground so called fire department. Fire department said he was fine, and put him into bed. Wife noticed his left hip looked like it was "sticking out" and leg appeared "out-turned." Wife was going to take patient for x-rays today, however early this morning, patient fell out of bed so she decided to call EMS. Patient w/ difficulty bearing weight. Denies LOC, headache, neck or back pain. Denies CP, palpitations, SOB, cough, n/v/d. Has been eating and drinking well. Patient has been quarantined with his wife in their home for 5 weeks.    Fever or chills: yes [  ]   no [ X]  Fatigue, malaise or generalized weakness: yes [  ]   no [ X ]  Shortness of breath/dyspnea: yes [  ]   no [  x]  Cough: yes [  ]   no [x  ]  Anorexia/po intolerance: yes [  ]   no [ x ]  Chest pain or chest tightness: yes [  ]   no [ x ]  Myalgias: yes [  ]   no [ x ]  Headache: yes [  ]   no [ x ]  Sore throat: yes [  ]   no [ x ]  Rhinorrhea: yes [  ]   no [x  ]  Abd pain: yes [  ]   no [ x ]  Nausea: yes [  ]   no [x  ]  Vomiting: yes [  ]   no [  x]  Diarrhea: yes [  ]   no [ x ]  Loss of sense of smell/anosmia: yes [  ]   no [x  ]  Conjunctivitis: yes [  ]   no [x  ]  Recent travel: yes [  ]   no [ x ]  Any sick contacts: yes [  ]   no [x  ]  Close contact with someone confirmed positive with COVID-19 / SARS-CoV2 in the last 14 days: yes [  ]   no [x  ]    In ED,  Vitals: T 98.9F, HR 83, /82, RR 16, SpO2 4L NC  Labs significant for: WBC 13.57, N/L ratio 15.9, H/H 9.4/30.1, BUN 46, Cr 1.8  EKG: NSR at 78bpm  CT head non cont:  no acute intracranial hemorrhage or mass effect.  CT cervical spine non cont: no evidence of cervical spine fracture.  CXR: no acute pulmonary process  Left hip xray: subcapital left femoral neck fracture, marked external rotation of hip, age indeterminant fracture deformity involving the left ischiopubic ramus  COVID PCR obtained while in ED.    INTERVAL HPI:  4/23/20: Pt seen and examined. Planned on OR today. cardio clearance obtained. Patient kept NPO pending procedure. COVID 19 -NEG, Pt unable to urinate. chronic anemia      OVERNIGHT EVENTS: none    Home Medications:  Amitiza 24 mcg oral capsule: 1 cap(s) orally 2 times a day (23 Apr 2020 11:40)  atorvastatin 10 mg oral tablet: 1 tab(s) orally once a day (23 Apr 2020 11:40)  escitalopram 20 mg oral tablet: 1 tab(s) orally once a day (23 Apr 2020 11:40)  finasteride 5 mg oral tablet: 1 tab(s) orally once a day (23 Apr 2020 11:40)  Metoprolol Succinate ER 50 mg oral tablet, extended release: 1 tab(s) orally once a day (23 Apr 2020 11:40)  traMADol 150 mg/24 hours oral capsule, extended release: 1 cap(s) orally once a day (23 Apr 2020 11:40)      MEDICATIONS  (STANDING):  atorvastatin 10 milliGRAM(s) Oral at bedtime  escitalopram 20 milliGRAM(s) Oral daily  finasteride 5 milliGRAM(s) Oral daily  lactated ringers. 1000 milliLiter(s) (75 mL/Hr) IV Continuous <Continuous>  metoprolol succinate ER 50 milliGRAM(s) Oral daily    MEDICATIONS  (PRN):  acetaminophen   Tablet .. 650 milliGRAM(s) Oral every 4 hours PRN Mild Pain (1 - 3)  acetaminophen   Tablet .. 1000 milliGRAM(s) Oral every 6 hours PRN Moderate Pain (4 - 6)      Allergies    No Known Allergies    Social History:  Lives w/ wife.  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  Per wife, "easily confused."  Denies current or past tobacco use.  Infrequent etoh use.  CBD pills for pain. (23 Apr 2020 07:10)          REVIEW OF SYSTEMS: i am ok, Pain Left hip  CONSTITUTIONAL: No fever, No chills, No fatigue, No myalgia, No Body ache, No Weakness  EYES: No eye pain,  No visual disturbances, No discharge, NO Redness  ENMT:  No ear pain, No nose bleed, No vertigo; No sinus or throat pain, No Congestion  NECK: No pain, No stiffness  RESPIRATORY: No cough, wheezing, No  hemoptysis, No shortness of breath  CARDIOVASCULAR: No chest pain, palpitations  GASTROINTESTINAL: No abdominal or epigastric pain. No nausea, No vomiting; No diarrhea or constipation. [  ] BM  GENITOURINARY: No dysuria, No frequency, No urgency, No hematuria, or incontinence  NEUROLOGICAL: No headaches, No dizziness, No numbness, No tingling, No tremors, No weakness  EXT: No Swelling, No Pain, No Edema  SKIN:  [ x ] No itching, burning, rashes, or lesions   MUSCULOSKELETAL: No joint pain or swelling; No muscle pain, No back pain, No extremity pain  PSYCHIATRIC: No depression, anxiety, mood swings or difficulty sleeping at night  PAIN SCALE: [  ] None  [ x ] Other-left hip pain  ROS Unable to obtain due to - [  ] Dementia  [  ] Lethargy  [  ] Sedated [  ] non verbal  REST OF REVIEW Of SYSTEM - [  ] Normal     Vital Signs Last 24 Hrs  T(C): 37.6 (23 Apr 2020 11:57), Max: 37.6 (23 Apr 2020 11:57)  T(F): 99.7 (23 Apr 2020 11:57), Max: 99.7 (23 Apr 2020 11:57)  HR: 80 (23 Apr 2020 11:57) (80 - 83)  BP: 124/73 (23 Apr 2020 11:57) (124/73 - 158/82)  BP(mean): --  RR: 20 (23 Apr 2020 11:57) (16 - 20)  SpO2: 91% (23 Apr 2020 11:57) (91% - 100%)  Finger Stick          PHYSICAL EXAM: forgetful  GENERAL:  [x  ] NAD , [  x] well appearing, [  ] Agitated, [  ] Drowsy,  [  ] Lethargy, [  ] confused   HEAD:  [x  ] Normal, [  ] Other  EYES:  [x  ] EOMI, [x  ] PERRLA, [  ] conjunctiva and sclera clear normal, [  ] Other,  [  ] Pallor,[  ] Discharge  ENMT:  [x ] Normal, [x  ] Dry  mucous membranes, [ x ] Good dentition, [x  ] No Thrush  NECK:  [x  ] Supple, [ x ] No JVD, [ x ] Normal thyroid, [  ] Lymphadenopathy [  ] Other  CHEST/LUNG:  [x  ] Clear to auscultation bilaterally, [ x ] Breath Sounds equal ,  [x  ] No rales, [x  ] No rhonchi  [x  ]  No wheezing  HEART:  [x  ] Regular rate and rhythm, [  ] tachycardia, [  ] Bradycardia,  [  ] irregular  [ x ] No murmurs, No rubs, No gallops, [  ] PPM in place (Mfr:  )  ABDOMEN:  [x  ] Soft, [ x ] Nontender, [ x ] Nondistended, [ x ] No mass, [ x ] Bowel sounds present, [x  ] obese  NERVOUS SYSTEM:  [ x ] Alert & Oriented X 2-3, [ x ] Nonfocal  [  ] Confusion  [  ] Encephalopathic [  ] Sedated [  ] Unable to assess, [  ] Other-  EXTREMITIES: [ x ] 2+ Peripheral Pulses, No clubbing, No cyanosis,  [  ] edema B/L lower EXT. [  ] PVD stasis skin changes B/L Lower EXT  LYMPH: No lymphadenopathy noted  SKIN:  [ x ] No rashes or lesions, [  ] Pressure Ulcers, [  ] ecchymosis, [  ] Skin Tears, [  ] Other    DIET: NPO except meds    LABS:                        9.4    13.57 )-----------( 168      ( 23 Apr 2020 06:17 )             30.1     23 Apr 2020 06:17    139    |  105    |  46     ----------------------------<  95     4.7     |  26     |  1.80     Ca    8.8        23 Apr 2020 06:17    TPro  7.6    /  Alb  4.0    /  TBili  0.8    /  DBili  x      /  AST  26     /  ALT  35     /  AlkPhos  76     23 Apr 2020 06:17    PT/INR - ( 23 Apr 2020 06:17 )   PT: 12.1 sec;   INR: 1.08 ratio         PTT - ( 23 Apr 2020 06:17 )  PTT:26.3 sec          CARDIAC MARKERS ( 23 Apr 2020 06:17 )  x     / x     / 365 U/L / x     / x                  RADIOLOGY & ADDITIONAL TESTS:  < from: Xray Hip w/ Pelvis 2 or 3 Views, Left (04.23.20 @ 06:36) >  EXAM:  XR HIP WITH PELV 2-3V LT                            PROCEDURE DATE:  04/23/2020        INTERPRETATION:  Clinical information: Left hip pain following fall.    4 views, which includes the pelvis.    There is a subcapital left femoral neck fracture. There is marked external rotation of the hip.    Surgical clips overlie the left groin region.    Partially imaged is a noncemented right total hip arthroplasty with proximal femoral cerclage banding.    There is an age indeterminant fracturedeformity involving the left ischiopubic ramus.    Impression:    Findings as discussed above.        MULUGETA CHAVIRA M.D., ATTENDING RADIOLOGIST  This document has been electronically signed. Apr 23 2020  7:54AM                < end of copied text >        HEALTH ISSUES - PROBLEM Dx:  Anemia: Anemia  Leukocytosis: Leukocytosis  Need for prophylactic measure: Need for prophylactic measure  BPH (benign prostatic hyperplasia): BPH (benign prostatic hyperplasia)  High cholesterol: High cholesterol  CAD (coronary artery disease): CAD (coronary artery disease)  Hypertension: Hypertension  MAYRA (acute kidney injury): MAYRA (acute kidney injury)  Closed fracture of left hip, initial encounter: Closed fracture of left hip, initial encounter          Consultant(s) Notes Reviewed:  [x] YES     Care Discussed with [X] Consultants  [x  ] Patient  [ x ] Family  [  ]   [  ] Social Service  [ x ] RN, [  ] Physical Therapy  DVT PPX: [  ] Lovenox, [  ] S C Heparin, [  ] Coumadin, [  ] Xarelto, [  ] Eliquis, [  ] Pradaxa, [  ] IV Heparin drip, [ x] SCD [  ] Contraindication 2 to GI Bleed,[  ] Ambulation  Advanced directive: [x  ] None, [  ] DNR/DNI

## 2020-04-23 NOTE — PROGRESS NOTE ADULT - PROBLEM SELECTOR PLAN 3
BUN 46, Cr 1.8 on admission  - Per wife, no hx of kidney disease  - Unknown etiology at this time, per wife has been eating and drinking normally; may be secondary to mild rhabdo as patient on floor following fall yesterday  - Check U/A and CK   - Continue IVFs w/ LR @ 75cc/hr for now  - F/u BMP in AM

## 2020-04-23 NOTE — H&P ADULT - PROBLEM SELECTOR PLAN 4
WBC 13.57, N/L ratio 15.9 on admission WBC 13.57, N/L ratio 15.9 on admission  - Most likely reactive in setting of acute fracture  - Doubt infectious etiology as patient w/o viral symptoms or fever, hemodynamically stable at this time  - F/u COVID PCR  - F/u procal in am H/H 9.4/30.1, baseline unknown  - No signs or symptoms of acute bleed, most likely chronic  - F/u iron studies in AM, folate, vitamin b12 H/H 9.4/30.1, baseline unknown, Chronic Anemia as per Wife  - No signs or symptoms of acute bleed, most likely chronic  - F/u iron studies in AM, folate, vitamin b12, anemia work up   -also 2/2 Fracture Hip

## 2020-04-23 NOTE — H&P ADULT - PROBLEM SELECTOR PLAN 8
Continue home atorvastatin daily Patient w/ no sick contacts and w/o cough, SOB, fever/chills  - Hemodynamically stable, afebrile  - F/u COVID PCR  - F/u d-dimer, crp, procal in AM

## 2020-04-23 NOTE — H&P ADULT - PROBLEM SELECTOR PLAN 3
BUN 46, Cr 1.8 on admission  - Per wife, no hx of kidney disease BUN 46, Cr 1.8 on admission  - Per wife, no hx of kidney disease  - Unknown etiology at this time, per wife has been eating and drinking normally; may be secondary to mild rhabdo as patient on floor following fall yesterday  - Check U/A and CK   - Continue IVFs w/ LR @ 75cc/hr for now  - F/u BMP in AM WBC 13.57,  Likely reactive -N/L ratio 15.9 on admission  - Most likely reactive in setting of acute fracture & possible urinary retention   - Doubt infectious etiology as patient w/o viral symptoms or fever, hemodynamically stable at this time  - F/u COVID PCR-NEG  - F/u procal in am, CBC in AM

## 2020-04-23 NOTE — DISCHARGE NOTE PROVIDER - INSTRUCTIONS
no alcohol with pain medication  encourage fluids daily no alcohol with pain medication  encourage fluids daily  Low salt diet

## 2020-04-23 NOTE — H&P ADULT - PROBLEM SELECTOR PLAN 6
/82 on presentation  - Continue home metoprolol ER 50mg po daily  - Cardiology consulted, [facility]

## 2020-04-23 NOTE — DISCHARGE NOTE PROVIDER - NSDCMRMEDTOKEN_GEN_ALL_CORE_FT
Amitiza 24 mcg oral capsule: 1 cap(s) orally 2 times a day  atorvastatin 10 mg oral tablet: 1 tab(s) orally once a day  escitalopram 20 mg oral tablet: 1 tab(s) orally once a day  finasteride 5 mg oral tablet: 1 tab(s) orally once a day  Metoprolol Succinate ER 50 mg oral tablet, extended release: 1 tab(s) orally once a day  traMADol 150 mg/24 hours oral capsule, extended release: 1 cap(s) orally once a day acetaminophen 325 mg oral tablet: 2 tab(s) orally every 6 hours, As Needed  Amitiza 24 mcg oral capsule: 1 cap(s) orally 2 times a day  atorvastatin 10 mg oral tablet: 1 tab(s) orally once a day  escitalopram 20 mg oral tablet: 1 tab(s) orally once a day  finasteride 5 mg oral tablet: 1 tab(s) orally once a day  Metoprolol Succinate ER 50 mg oral tablet, extended release: 1 tab(s) orally once a day  traMADol 150 mg/24 hours oral capsule, extended release: 1 cap(s) orally once a day acetaminophen 325 mg oral tablet: 2 tab(s) orally every 6 hours, As Needed  Amitiza 24 mcg oral capsule: 1 cap(s) orally 2 times a day  atorvastatin 10 mg oral tablet: 1 tab(s) orally once a day  escitalopram 20 mg oral tablet: 1 tab(s) orally once a day  finasteride 5 mg oral tablet: 1 tab(s) orally once a day  Metoprolol Succinate ER 50 mg oral tablet, extended release: 1 tab(s) orally once a day  rolling walker:   traMADol 150 mg/24 hours oral capsule, extended release: 1 cap(s) orally once a day acetaminophen 325 mg oral tablet: 2 tab(s) orally every 6 hours, As Needed  Amitiza 24 mcg oral capsule: 1 cap(s) orally 2 times a day  atorvastatin 10 mg oral tablet: 1 tab(s) orally once a day  escitalopram 20 mg oral tablet: 1 tab(s) orally once a day  finasteride 5 mg oral tablet: 1 tab(s) orally once a day  Metoprolol Succinate ER 50 mg oral tablet, extended release: 1 tab(s) orally once a day  rivaroxaban 10 mg oral tablet: 1 tab(s) orally once a day  rolling walker:   traMADol 150 mg/24 hours oral capsule, extended release: 1 cap(s) orally once a day acetaminophen 325 mg oral tablet: 2 tab(s) orally every 6 hours, As Needed  Amitiza 24 mcg oral capsule: 1 cap(s) orally 2 times a day  atorvastatin 10 mg oral tablet: 1 tab(s) orally once a day  escitalopram 20 mg oral tablet: 1 tab(s) orally once a day  finasteride 5 mg oral tablet: 1 tab(s) orally once a day  folic acid 1 mg oral tablet: 1 tab(s) orally once a day  Metoprolol Succinate ER 50 mg oral tablet, extended release: 1 tab(s) orally once a day  Multiple Vitamins oral tablet: 1 tab(s) orally once a day  polyethylene glycol 3350 oral powder for reconstitution: 17 gram(s) orally once a day  rivaroxaban 10 mg oral tablet: 1 tab(s) orally once a day  rolling walker:   traMADol 150 mg/24 hours oral capsule, extended release: 1 cap(s) orally once a day acetaminophen 325 mg oral tablet: 2 tab(s) orally every 6 hours, As Needed  Amitiza 24 mcg oral capsule: 1 cap(s) orally 2 times a day  atorvastatin 10 mg oral tablet: 1 tab(s) orally once a day  escitalopram 20 mg oral tablet: 1 tab(s) orally once a day  finasteride 5 mg oral tablet: 1 tab(s) orally once a day  folic acid 1 mg oral tablet: 1 tab(s) orally once a day  Metoprolol Succinate ER 50 mg oral tablet, extended release: 1 tab(s) orally once a day  Multiple Vitamins oral tablet: 1 tab(s) orally once a day  polyethylene glycol 3350 oral powder for reconstitution: 17 gram(s) orally once a day  rivaroxaban 10 mg oral tablet: 1 tab(s) orally once a day  rolling walker:   tamsulosin 0.4 mg oral capsule: 1 cap(s) orally once a day (at bedtime)  traMADol 150 mg/24 hours oral capsule, extended release: 1 cap(s) orally once a day acetaminophen 325 mg oral tablet: 2 tab(s) orally every 6 hours, As Needed  Amitiza 24 mcg oral capsule: 1 cap(s) orally 2 times a day  ascorbic acid 500 mg oral tablet: 1 tab(s) orally 2 times a day  atorvastatin 10 mg oral tablet: 1 tab(s) orally once a day  escitalopram 20 mg oral tablet: 1 tab(s) orally once a day  finasteride 5 mg oral tablet: 1 tab(s) orally once a day  folic acid 1 mg oral tablet: 1 tab(s) orally once a day  Metoprolol Succinate ER 50 mg oral tablet, extended release: 1 tab(s) orally once a day  Multiple Vitamins oral tablet: 1 tab(s) orally once a day  polyethylene glycol 3350 oral powder for reconstitution: 17 gram(s) orally once a day  rivaroxaban 10 mg oral tablet: 1 tab(s) orally once a day  rolling walker:   senna oral tablet: 2 tab(s) orally once a day (at bedtime)  tamsulosin 0.4 mg oral capsule: 1 cap(s) orally once a day (at bedtime)  traMADol 150 mg/24 hours oral capsule, extended release: 1 cap(s) orally once a day

## 2020-04-23 NOTE — ED ADULT NURSE NOTE - PSH
History of total hip replacement, right    History of total right knee replacement (TKR)  X2 History of total hip replacement, right    History of total right knee replacement (TKR)  X2  S/P mitral valve replacement

## 2020-04-24 LAB
ALBUMIN SERPL ELPH-MCNC: 3.2 G/DL — LOW (ref 3.3–5)
ALP SERPL-CCNC: 61 U/L — SIGNIFICANT CHANGE UP (ref 40–120)
ALT FLD-CCNC: 32 U/L — SIGNIFICANT CHANGE UP (ref 12–78)
ANION GAP SERPL CALC-SCNC: 9 MMOL/L — SIGNIFICANT CHANGE UP (ref 5–17)
APPEARANCE UR: CLEAR — SIGNIFICANT CHANGE UP
AST SERPL-CCNC: 71 U/L — HIGH (ref 15–37)
BACTERIA # UR AUTO: ABNORMAL
BASOPHILS # BLD AUTO: 0.02 K/UL — SIGNIFICANT CHANGE UP (ref 0–0.2)
BASOPHILS NFR BLD AUTO: 0.1 % — SIGNIFICANT CHANGE UP (ref 0–2)
BILIRUB SERPL-MCNC: 0.5 MG/DL — SIGNIFICANT CHANGE UP (ref 0.2–1.2)
BILIRUB UR-MCNC: NEGATIVE — SIGNIFICANT CHANGE UP
BUN SERPL-MCNC: 38 MG/DL — HIGH (ref 7–23)
CALCIUM SERPL-MCNC: 8.6 MG/DL — SIGNIFICANT CHANGE UP (ref 8.5–10.1)
CHLORIDE SERPL-SCNC: 105 MMOL/L — SIGNIFICANT CHANGE UP (ref 96–108)
CO2 SERPL-SCNC: 25 MMOL/L — SIGNIFICANT CHANGE UP (ref 22–31)
COLOR SPEC: YELLOW — SIGNIFICANT CHANGE UP
CREAT SERPL-MCNC: 1.7 MG/DL — HIGH (ref 0.5–1.3)
D DIMER BLD IA.RAPID-MCNC: 1656 NG/ML DDU — HIGH
DIFF PNL FLD: ABNORMAL
EOSINOPHIL # BLD AUTO: 0 K/UL — SIGNIFICANT CHANGE UP (ref 0–0.5)
EOSINOPHIL NFR BLD AUTO: 0 % — SIGNIFICANT CHANGE UP (ref 0–6)
EPI CELLS # UR: SIGNIFICANT CHANGE UP
FERRITIN SERPL-MCNC: 57 NG/ML — SIGNIFICANT CHANGE UP (ref 30–400)
FOLATE SERPL-MCNC: 4.6 NG/ML — LOW
GLUCOSE SERPL-MCNC: 128 MG/DL — HIGH (ref 70–99)
GLUCOSE UR QL: NEGATIVE — SIGNIFICANT CHANGE UP
HCT VFR BLD CALC: 24.7 % — LOW (ref 39–50)
HGB BLD-MCNC: 7.7 G/DL — LOW (ref 13–17)
IMM GRANULOCYTES NFR BLD AUTO: 0.6 % — SIGNIFICANT CHANGE UP (ref 0–1.5)
INR BLD: 1.2 RATIO — HIGH (ref 0.88–1.16)
IRON SATN MFR SERPL: 23 UG/DL — LOW (ref 45–165)
IRON SATN MFR SERPL: 6 % — LOW (ref 16–55)
KETONES UR-MCNC: ABNORMAL
LEUKOCYTE ESTERASE UR-ACNC: NEGATIVE — SIGNIFICANT CHANGE UP
LYMPHOCYTES # BLD AUTO: 0.95 K/UL — LOW (ref 1–3.3)
LYMPHOCYTES # BLD AUTO: 6.6 % — LOW (ref 13–44)
MAGNESIUM SERPL-MCNC: 2.2 MG/DL — SIGNIFICANT CHANGE UP (ref 1.6–2.6)
MCHC RBC-ENTMCNC: 28.2 PG — SIGNIFICANT CHANGE UP (ref 27–34)
MCHC RBC-ENTMCNC: 31.2 GM/DL — LOW (ref 32–36)
MCV RBC AUTO: 90.5 FL — SIGNIFICANT CHANGE UP (ref 80–100)
MONOCYTES # BLD AUTO: 1.53 K/UL — HIGH (ref 0–0.9)
MONOCYTES NFR BLD AUTO: 10.6 % — SIGNIFICANT CHANGE UP (ref 2–14)
NEUTROPHILS # BLD AUTO: 11.82 K/UL — HIGH (ref 1.8–7.4)
NEUTROPHILS NFR BLD AUTO: 82.1 % — HIGH (ref 43–77)
NITRITE UR-MCNC: NEGATIVE — SIGNIFICANT CHANGE UP
NRBC # BLD: 0 /100 WBCS — SIGNIFICANT CHANGE UP (ref 0–0)
PH UR: 5 — SIGNIFICANT CHANGE UP (ref 5–8)
PHOSPHATE SERPL-MCNC: 3.9 MG/DL — SIGNIFICANT CHANGE UP (ref 2.5–4.5)
PLATELET # BLD AUTO: 142 K/UL — LOW (ref 150–400)
POTASSIUM SERPL-MCNC: 4 MMOL/L — SIGNIFICANT CHANGE UP (ref 3.5–5.3)
POTASSIUM SERPL-SCNC: 4 MMOL/L — SIGNIFICANT CHANGE UP (ref 3.5–5.3)
PROCALCITONIN SERPL-MCNC: 0.22 NG/ML — HIGH (ref 0–0.04)
PROT SERPL-MCNC: 6.4 G/DL — SIGNIFICANT CHANGE UP (ref 6–8.3)
PROT UR-MCNC: 30 MG/DL
PROTHROM AB SERPL-ACNC: 13.5 SEC — HIGH (ref 10–12.9)
RBC # BLD: 2.73 M/UL — LOW (ref 4.2–5.8)
RBC # FLD: 12.9 % — SIGNIFICANT CHANGE UP (ref 10.3–14.5)
RBC CASTS # UR COMP ASSIST: ABNORMAL /HPF (ref 0–4)
SODIUM SERPL-SCNC: 139 MMOL/L — SIGNIFICANT CHANGE UP (ref 135–145)
SP GR SPEC: 1.02 — SIGNIFICANT CHANGE UP (ref 1.01–1.02)
TIBC SERPL-MCNC: 356 UG/DL — SIGNIFICANT CHANGE UP (ref 220–430)
UIBC SERPL-MCNC: 333 UG/DL — SIGNIFICANT CHANGE UP (ref 110–370)
UROBILINOGEN FLD QL: NEGATIVE — SIGNIFICANT CHANGE UP
VIT B12 SERPL-MCNC: 706 PG/ML — SIGNIFICANT CHANGE UP (ref 232–1245)
WBC # BLD: 14.4 K/UL — HIGH (ref 3.8–10.5)
WBC # FLD AUTO: 14.4 K/UL — HIGH (ref 3.8–10.5)
WBC UR QL: NEGATIVE — SIGNIFICANT CHANGE UP

## 2020-04-24 PROCEDURE — 73503 X-RAY EXAM HIP UNI 4/> VIEWS: CPT | Mod: 26,LT

## 2020-04-24 PROCEDURE — 99232 SBSQ HOSP IP/OBS MODERATE 35: CPT

## 2020-04-24 RX ORDER — IRON SUCROSE 20 MG/ML
100 INJECTION, SOLUTION INTRAVENOUS EVERY 24 HOURS
Refills: 0 | Status: COMPLETED | OUTPATIENT
Start: 2020-04-24 | End: 2020-04-26

## 2020-04-24 RX ORDER — TRAMADOL HYDROCHLORIDE 50 MG/1
50 TABLET ORAL DAILY
Refills: 0 | Status: DISCONTINUED | OUTPATIENT
Start: 2020-04-24 | End: 2020-04-28

## 2020-04-24 RX ORDER — IRON SUCROSE 20 MG/ML
100 INJECTION, SOLUTION INTRAVENOUS EVERY 24 HOURS
Refills: 0 | Status: DISCONTINUED | OUTPATIENT
Start: 2020-04-24 | End: 2020-04-24

## 2020-04-24 RX ADMIN — Medication 650 MILLIGRAM(S): at 06:23

## 2020-04-24 RX ADMIN — Medication 1 TABLET(S): at 11:43

## 2020-04-24 RX ADMIN — ATORVASTATIN CALCIUM 10 MILLIGRAM(S): 80 TABLET, FILM COATED ORAL at 21:55

## 2020-04-24 RX ADMIN — TRAMADOL HYDROCHLORIDE 50 MILLIGRAM(S): 50 TABLET ORAL at 11:48

## 2020-04-24 RX ADMIN — HEPARIN SODIUM 5000 UNIT(S): 5000 INJECTION INTRAVENOUS; SUBCUTANEOUS at 23:16

## 2020-04-24 RX ADMIN — Medication 1 MILLIGRAM(S): at 11:43

## 2020-04-24 RX ADMIN — Medication 650 MILLIGRAM(S): at 17:02

## 2020-04-24 RX ADMIN — HEPARIN SODIUM 5000 UNIT(S): 5000 INJECTION INTRAVENOUS; SUBCUTANEOUS at 06:21

## 2020-04-24 RX ADMIN — Medication 50 MILLIGRAM(S): at 06:22

## 2020-04-24 RX ADMIN — FINASTERIDE 5 MILLIGRAM(S): 5 TABLET, FILM COATED ORAL at 13:22

## 2020-04-24 RX ADMIN — Medication 10 MILLIGRAM(S): at 11:43

## 2020-04-24 RX ADMIN — Medication 500 MILLIGRAM(S): at 06:21

## 2020-04-24 RX ADMIN — Medication 100 MILLIGRAM(S): at 06:21

## 2020-04-24 RX ADMIN — ESCITALOPRAM OXALATE 20 MILLIGRAM(S): 10 TABLET, FILM COATED ORAL at 11:43

## 2020-04-24 RX ADMIN — IRON SUCROSE 100 MILLIGRAM(S): 20 INJECTION, SOLUTION INTRAVENOUS at 21:55

## 2020-04-24 RX ADMIN — HEPARIN SODIUM 5000 UNIT(S): 5000 INJECTION INTRAVENOUS; SUBCUTANEOUS at 17:02

## 2020-04-24 RX ADMIN — Medication 650 MILLIGRAM(S): at 23:17

## 2020-04-24 RX ADMIN — SENNA PLUS 2 TABLET(S): 8.6 TABLET ORAL at 21:55

## 2020-04-24 RX ADMIN — Medication 650 MILLIGRAM(S): at 13:21

## 2020-04-24 RX ADMIN — Medication 500 MILLIGRAM(S): at 17:02

## 2020-04-24 RX ADMIN — POLYETHYLENE GLYCOL 3350 17 GRAM(S): 17 POWDER, FOR SOLUTION ORAL at 11:43

## 2020-04-24 RX ADMIN — Medication 3 MILLIGRAM(S): at 21:55

## 2020-04-24 NOTE — PROGRESS NOTE ADULT - PROBLEM SELECTOR PLAN 3
BUN 46, Cr 1.8 on admission  - Per wife, no hx of kidney disease  - Unknown etiology at this time, per wife has been eating and drinking normally; may be secondary to mild rhabdo as patient on floor following fall   - F/u BMP in AM WBC 13.57, N/L ratio 15.9 on admission  - Most likely reactive in setting of acute fracture  - Doubt infectious etiology as patient w/o viral symptoms or fever, hemodynamically stable at this time  - COVID negative

## 2020-04-24 NOTE — PROGRESS NOTE ADULT - SUBJECTIVE AND OBJECTIVE BOX
[facility] -- Estrada Bingham, Rg, Axel, Guille Emanuel Savella  Office # [phone]      Follow Up:    post op hip    Subjective/Observations:   No events overnight resting comfortably in bed.  No complaints of chest pain, dyspnea, or palpitations reported. No signs of orthopnea or PND.    REVIEW OF SYSTEMS: All other review of systems is negative unless indicated above    PAST MEDICAL & SURGICAL HISTORY:  Anemia  S/P AVR (aortic valve replacement)  Mild CAD: Non Obstructive CAD  Chronic pain: s/p knee and hip replacements  High cholesterol  BPH (benign prostatic hyperplasia)  Hypertension  S/P AVR (aortic valve replacement)  History of total hip replacement, right  History of total right knee replacement (TKR): X2      MEDICATIONS  (STANDING):  acetaminophen   Tablet .. 650 milliGRAM(s) Oral every 6 hours  ascorbic acid 500 milliGRAM(s) Oral two times a day  atorvastatin 10 milliGRAM(s) Oral at bedtime  bisacodyl Suppository 10 milliGRAM(s) Rectal daily  escitalopram 20 milliGRAM(s) Oral daily  finasteride 5 milliGRAM(s) Oral daily  folic acid 1 milliGRAM(s) Oral daily  heparin   Injectable 5000 Unit(s) SubCutaneous every 8 hours  lactated ringers. 1000 milliLiter(s) (75 mL/Hr) IV Continuous <Continuous>  melatonin 3 milliGRAM(s) Oral at bedtime  metoprolol succinate ER 50 milliGRAM(s) Oral daily  multivitamin 1 Tablet(s) Oral daily  polyethylene glycol 3350 17 Gram(s) Oral daily  senna 2 Tablet(s) Oral at bedtime    MEDICATIONS  (PRN):  aluminum hydroxide/magnesium hydroxide/simethicone Suspension 30 milliLiter(s) Oral four times a day PRN Indigestion  benzocaine 15 mG/menthol 3.6 mG (Sugar-Free) Lozenge 1 Lozenge Oral every 3 hours PRN Sore Throat  magnesium hydroxide Suspension 30 milliLiter(s) Oral daily PRN Constipation  morphine  - Injectable 2 milliGRAM(s) IV Push every 6 hours PRN breakthrough pain  ondansetron Injectable 4 milliGRAM(s) IV Push every 6 hours PRN Nausea and/or Vomiting  traMADol 50 milliGRAM(s) Oral every 6 hours PRN Severe Pain (7 - 10)  traMADol 25 milliGRAM(s) Oral every 4 hours PRN Moderate Pain (4 - 6)      Allergies    No Known Allergies    Intolerances        Vital Signs Last 24 Hrs  T(C): 36.4 (24 Apr 2020 07:45), Max: 37.6 (23 Apr 2020 11:57)  T(F): 97.5 (24 Apr 2020 07:45), Max: 99.7 (23 Apr 2020 11:57)  HR: 74 (24 Apr 2020 07:45) (70 - 81)  BP: 130/71 (24 Apr 2020 07:45) (101/62 - 148/64)  BP(mean): --  RR: 18 (24 Apr 2020 07:45) (14 - 20)  SpO2: 100% (24 Apr 2020 07:45) (91% - 100%)    I&O's Summary    23 Apr 2020 07:01  -  24 Apr 2020 07:00  --------------------------------------------------------  IN: 100 mL / OUT: 1060 mL / NET: -960 mL      Weight (kg): 83.9 (04-23 @ 14:30)    PHYSICAL EXAM:  TELE: not on tele   Constitutional: NAD, awake and alert, well-developed  HEENT: Moist Mucous Membranes, Anicteric  Pulmonary: Non-labored, breath sounds are clear bilaterally, No wheezing, crackles or rhonchi  Cardiovascular: Regular, S1 and S2 nl, No murmurs, rubs, gallops or clicks  Gastrointestinal: Bowel Sounds present, soft, nontender.   Lymph: No lymphadenopathy. No peripheral edema.      LABS: All Labs Reviewed:                        8.6    13.29 )-----------( 150      ( 23 Apr 2020 18:02 )             28.2                         9.4    13.57 )-----------( 168      ( 23 Apr 2020 06:17 )             30.1     23 Apr 2020 18:02    142    |  109    |  37     ----------------------------<  116    4.7     |  25     |  1.60   23 Apr 2020 06:17    139    |  105    |  46     ----------------------------<  95     4.7     |  26     |  1.80     Ca    8.8        23 Apr 2020 18:02  Ca    8.8        23 Apr 2020 06:17    TPro  7.6    /  Alb  4.0    /  TBili  0.8    /  DBili  x      /  AST  26     /  ALT  35     /  AlkPhos  76     23 Apr 2020 06:17    PT/INR - ( 23 Apr 2020 06:17 )   PT: 12.1 sec;   INR: 1.08 ratio         PTT - ( 23 Apr 2020 06:17 )  PTT:26.3 sec  CARDIAC MARKERS ( 23 Apr 2020 06:17 )  x     / x     / 365 U/L / x     / x            ECG:  < from: 12 Lead ECG (04.23.20 @ 05:57) >    Ventricular Rate 78 BPM    Atrial Rate 78 BPM    P-R Interval 178 ms    QRS Duration 82 ms    Q-T Interval 398 ms    QTC Calculation(Bezet) 453 ms    P Axis 68 degrees    R Axis 60 degrees    T Axis 54 degrees    Diagnosis Line Normal sinus rhythm  Possible Left atrial enlargement  Borderline ECG  No previous ECGs available    < end of copied text >    Echo:      Radiology:

## 2020-04-24 NOTE — PROGRESS NOTE ADULT - ASSESSMENT
A/P:  Patient is a 85y Male s/p L hip judy Stable POD 1    -Medical management per primary team  -FU AM labs  -Posterior hip precautions  -Abd pillow while in bed/chair at all times  -Ice/Elevate  -Incentive Spirometry  -Multimodal Analgesia  -Ppx ABX  -DVT PE ppx  -SCDs  -PT/OT OOB   -WBAT  -Discharge planning - pending PT eval  -Will discuss w/ attending and advise if plan changes

## 2020-04-24 NOTE — PROGRESS NOTE ADULT - ASSESSMENT
85 year old male with PMH of HTN, HLD, CAD (s/p open heart surgery 5yrs ago), chronic pain (s/p knee and rt hip replacement), BPH, and mild confusion (per wife) BIBEMS to [facility] ED s/p fall, admitted for left hip fracture.    ortho  POD 1 LEFT hip judy  Tolerated procedure with no cardiac complications  pt  pain control  followed by ortho     MAYRA   - F/u BMP this am     Anemia.  As per primary , follow up hem onc recs    CAD  Continue home metoprolol daily and atorvastatin daily  -H/O AVR      Hypertension  -Stable 130/71  - Continue home metoprolol ER 50mg po daily      Monitor and replete electrolytes. Keep K>4.0 and Mg>2.0.  Hanna Garcia FNP-C  Cardiology NP  [phone]

## 2020-04-24 NOTE — PROGRESS NOTE ADULT - PROBLEM SELECTOR PLAN 5
H/H 9.4/30.1 on admission, baseline unknown  H/H today 7.7/24.7, likely multifactorial with components of dilution s/p IVF   - No signs or symptoms of acute bleed, most likely chronic  - heme consulted - continue home med toprol XL 50mg daily with hold parameters

## 2020-04-24 NOTE — PROGRESS NOTE ADULT - PROBLEM SELECTOR PLAN 4
WBC 13.57, N/L ratio 15.9 on admission  - Most likely reactive in setting of acute fracture  - Doubt infectious etiology as patient w/o viral symptoms or fever, hemodynamically stable at this time  - COVID negative BUN 46, Cr 1.8 on admission, Encourage PO fluids  - Per wife, no hx of kidney disease  - Unknown etiology at this time, per wife has been eating and drinking normally; may be secondary to mild rhabdo as patient on floor following fall   - F/u BMP in AM

## 2020-04-24 NOTE — OCCUPATIONAL THERAPY INITIAL EVALUATION ADULT - PERTINENT HX OF CURRENT PROBLEM, REHAB EVAL
Pt is an 84 y/o male s/p fall. Wife was unable to get him off ground so called fire department. Fire department said he was fine, and put him into bed. Wife noticed his left hip looked like it was "sticking out" and leg appeared "out-turned." Wife was going to take patient for x-rays today, however early this morning, patient fell out of bed so she decided to call EMS. S/p left hip hemiarthroplasty 4/23/20.

## 2020-04-24 NOTE — PROGRESS NOTE ADULT - PROBLEM SELECTOR PLAN 1
Admit to general medical floors  - Left hip xray: subcapital left femoral neck fracture, marked external rotation of hip, age indeterminant fracture deformity involving the left ischiopubic ramus  - CT head and cervical spine w/ no acute findings   - Pt w/ left hip fx s/p fall  - POD 1 s/p hip hemiarthroplasty  - Cardio consulted for clearance - clear for surgery  - RCRI score 2, 10.1% keegan-op risk, patient is optimized from a medical standpoint to proceed with urgent procedure with routine hemodynamic monitoring  - PT consulted - patient to received 50 mg tramadol 30 minutes prior to PT  - Bedrest, fall risk protocol   - Spoke to family; who reports that patient had severe AMS and agitation when given dilaudid or oxycodone. Will order tramadol 50 PRN for severe, tramadol 25 PRN for moderate, tylenol Q6H for pain, and morphine for breakthrough pain.

## 2020-04-24 NOTE — PROGRESS NOTE ADULT - PROBLEM SELECTOR PLAN 6
- continue home med toprol XL 50mg daily with hold parameters - continue Lipitor 10mg bedtime  -H/O AVR

## 2020-04-24 NOTE — OCCUPATIONAL THERAPY INITIAL EVALUATION ADULT - ADDITIONAL COMMENTS
Pt is a questionable historian upon eval; periods of confusion. Per pt, he lives with his wife in a 1-level home, no steps to enter. Bathroom has a stall shower with grab bars. Pt reported he uses cane for functional mobility. Per H&P, per wife, pt is often "off balance" and with mild confusion.

## 2020-04-24 NOTE — OCCUPATIONAL THERAPY INITIAL EVALUATION ADULT - ADL RETRAINING, OT EVAL
Patient will dress lower body with modA, AE and vc's for sequencing due to hip precautions within 3-5 sessions

## 2020-04-24 NOTE — PROGRESS NOTE ADULT - SUBJECTIVE AND OBJECTIVE BOX
Orthopedics Post-op Check    Patient seen and examined at bedside, resting comfortably. No acute overnight events. Pain adequately controlled. Patient feeling well. No nausea or vomiting. No other acute complaints at this time    Vital Signs Last 24 Hrs  T(C): 37.1 (04-24-20 @ 06:15), Max: 37.6 (04-23-20 @ 11:57)  T(F): 98.7 (04-24-20 @ 06:15), Max: 99.7 (04-23-20 @ 11:57)  HR: 81 (04-24-20 @ 06:15) (70 - 81)  BP: 145/75 (04-24-20 @ 06:15) (101/62 - 148/64)  BP(mean): --  RR: 18 (04-24-20 @ 06:15) (14 - 20)  SpO2: 98% (04-24-20 @ 06:15) (91% - 100%)                        8.6    13.29 )-----------( 150      ( 23 Apr 2020 18:02 )             28.2     23 Apr 2020 18:02    142    |  109    |  37     ----------------------------<  116    4.7     |  25     |  1.60     Ca    8.8        23 Apr 2020 18:02    TPro  7.6    /  Alb  4.0    /  TBili  0.8    /  DBili  x      /  AST  26     /  ALT  35     /  AlkPhos  76     23 Apr 2020 06:17    PT/INR - ( 23 Apr 2020 06:17 )   PT: 12.1 sec;   INR: 1.08 ratio         PTT - ( 23 Apr 2020 06:17 )  PTT:26.3 sec    Exam:  Gen: NAD, Awake and alert, following commands  LLE:  ABD pillow in place  Dressing clean and dry  +EHL/FHL/TA/GS  SILT L2-S1  +DP  Calf Soft NT  Compartments soft and compressible

## 2020-04-24 NOTE — PROGRESS NOTE ADULT - PROBLEM SELECTOR PLAN 2
Patient w/ no sick contacts and w/o cough, SOB, fever/chills  - Hemodynamically stable, afebrile  - COVID PCR negative  - F/u d-dimer, crp, procal in AM H/H 9.4/30.1 on admission, baseline unknown  H/H today 7.7/24.7, likely multifactorial with components of dilution s/p IVF   - No signs or symptoms of acute bleed, most likely chronic  - heme consulted D/W Dr Israel -Anemia work up done -start on IV Venofer, CBC in AM

## 2020-04-24 NOTE — PROGRESS NOTE ADULT - ASSESSMENT
84y/o M with PMH of HTN, HLD, CAD (s/p open heart surgery 5yrs ago), chronic pain (s/p knee and rt hip replacement), BPH, and mild confusion (per wife) BIBEMS to [facility] ED s/p fall, admitted for left hip fracture. S/p hip hemiarthroplasty POD1

## 2020-04-24 NOTE — PROGRESS NOTE ADULT - SUBJECTIVE AND OBJECTIVE BOX
Patient is a 85y old  Male who presents with a chief complaint of Left hip fracture (subcapital left femoral neck fracture)  Patients cell #: [phone] (2020 08:49)      86y/o M with PMH of HTN, HLD, CAD (s/p open heart surgery 5yrs ago), chronic pain (s/p knee and rt hip replacement), BPH, and mild confusion (per wife) BIBEMS to [facility] ED s/p fall. Per wife, whom patient lives with, patient was ambulating with his cane when he lost balance and fell. Patient was in normal state of health prior to this time. Patient is often is "off balance." Wife was unable to get him off ground so called fire department. Fire department said he was fine, and put him into bed. Wife noticed his left hip looked like it was "sticking out" and leg appeared "out-turned." Wife was going to take patient for x-rays today, however early this morning, patient fell out of bed so she decided to call EMS. Patient w/ difficulty bearing weight. Denies LOC, headache, neck or back pain. Denies CP, palpitations, SOB, cough, n/v/d. Has been eating and drinking well. Patient has been quarantined with his wife in their home for 5 weeks.    Fever or chills: yes [  ]   no [ X]  Fatigue, malaise or generalized weakness: yes [  ]   no [ X ]  Shortness of breath/dyspnea: yes [  ]   no [  x]  Cough: yes [  ]   no [x  ]  Anorexia/po intolerance: yes [  ]   no [ x ]  Chest pain or chest tightness: yes [  ]   no [ x ]  Myalgias: yes [  ]   no [ x ]  Headache: yes [  ]   no [ x ]  Sore throat: yes [  ]   no [ x ]  Rhinorrhea: yes [  ]   no [x  ]  Abd pain: yes [  ]   no [ x ]  Nausea: yes [  ]   no [x  ]  Vomiting: yes [  ]   no [  x]  Diarrhea: yes [  ]   no [ x ]  Loss of sense of smell/anosmia: yes [  ]   no [x  ]  Conjunctivitis: yes [  ]   no [x  ]  Recent travel: yes [  ]   no [ x ]  Any sick contacts: yes [  ]   no [x  ]  Close contact with someone confirmed positive with COVID-19 / SARS-CoV2 in the last 14 days: yes [  ]   no [x  ]    In ED,  Vitals: T 98.9F, HR 83, /82, RR 16, SpO2 4L NC  Labs significant for: WBC 13.57, N/L ratio 15.9, H/H 9.4/30.1, BUN 46, Cr 1.8  EKG: NSR at 78bpm  CT head non cont:  no acute intracranial hemorrhage or mass effect.  CT cervical spine non cont: no evidence of cervical spine fracture.  CXR: no acute pulmonary process  Left hip xray: subcapital left femoral neck fracture, marked external rotation of hip, age indeterminant fracture deformity involving the left ischiopubic ramus  COVID PCR obtained while in ED.    INTERVAL HPI:  20: Pt seen and examined. Planned on OR today. cardio clearance obtained. Patient kept NPO pending procedure. COVID 19 -NEG, Pt unable to urinate. chronic anemia  20: Pt seen and examined. S/p Hemiarthroplasty POD 1. Seen by Heme/onc for anemia, likely multifactorial.     OVERNIGHT EVENTS: none    Home Medications:  Amitiza 24 mcg oral capsule: 1 cap(s) orally 2 times a day (2020 11:40)  atorvastatin 10 mg oral tablet: 1 tab(s) orally once a day (2020 11:40)  escitalopram 20 mg oral tablet: 1 tab(s) orally once a day (2020 11:40)  finasteride 5 mg oral tablet: 1 tab(s) orally once a day (2020 11:40)  Metoprolol Succinate ER 50 mg oral tablet, extended release: 1 tab(s) orally once a day (2020 11:40)  traMADol 150 mg/24 hours oral capsule, extended release: 1 cap(s) orally once a day (2020 11:40)      MEDICATIONS  (STANDING):  acetaminophen   Tablet .. 650 milliGRAM(s) Oral every 6 hours  ascorbic acid 500 milliGRAM(s) Oral two times a day  atorvastatin 10 milliGRAM(s) Oral at bedtime  bisacodyl Suppository 10 milliGRAM(s) Rectal daily  escitalopram 20 milliGRAM(s) Oral daily  finasteride 5 milliGRAM(s) Oral daily  folic acid 1 milliGRAM(s) Oral daily  heparin   Injectable 5000 Unit(s) SubCutaneous every 8 hours  melatonin 3 milliGRAM(s) Oral at bedtime  metoprolol succinate ER 50 milliGRAM(s) Oral daily  multivitamin 1 Tablet(s) Oral daily  polyethylene glycol 3350 17 Gram(s) Oral daily  senna 2 Tablet(s) Oral at bedtime    MEDICATIONS  (PRN):  aluminum hydroxide/magnesium hydroxide/simethicone Suspension 30 milliLiter(s) Oral four times a day PRN Indigestion  benzocaine 15 mG/menthol 3.6 mG (Sugar-Free) Lozenge 1 Lozenge Oral every 3 hours PRN Sore Throat  magnesium hydroxide Suspension 30 milliLiter(s) Oral daily PRN Constipation  morphine  - Injectable 2 milliGRAM(s) IV Push every 6 hours PRN breakthrough pain  ondansetron Injectable 4 milliGRAM(s) IV Push every 6 hours PRN Nausea and/or Vomiting  traMADol 50 milliGRAM(s) Oral daily PRN 30 minutes prior to Physical Therapy  traMADol 50 milliGRAM(s) Oral every 6 hours PRN Severe Pain (7 - 10)  traMADol 25 milliGRAM(s) Oral every 4 hours PRN Moderate Pain (4 - 6)      Allergies    No Known Allergies    Social History:  Lives w/ wife.  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  Ambulates with cane.  Per wife, "easily confused."  Denies current or past tobacco use.  Infrequent etoh use.  CBD pills for pain. (2020 07:10)        REVIEW OF SYSTEMS: i am ok, Pain Left hip  CONSTITUTIONAL: No fever, No chills, No fatigue, No myalgia, No Body ache, No Weakness  EYES: No eye pain,  No visual disturbances, No discharge, NO Redness  ENMT:  No ear pain, No nose bleed, No vertigo; No sinus or throat pain, No Congestion  NECK: No pain, No stiffness  RESPIRATORY: No cough, wheezing, No  hemoptysis, No shortness of breath  CARDIOVASCULAR: No chest pain, palpitations  GASTROINTESTINAL: No abdominal or epigastric pain. No nausea, No vomiting; No diarrhea or constipation. [  ] BM  GENITOURINARY: No dysuria, No frequency, No urgency, No hematuria, or incontinence  NEUROLOGICAL: No headaches, No dizziness, No numbness, No tingling, No tremors, No weakness  EXT: No Swelling, No Pain, No Edema  SKIN:  [ x ] No itching, burning, rashes, or lesions   MUSCULOSKELETAL: No joint pain or swelling; No muscle pain, No back pain, No extremity pain  PSYCHIATRIC: No depression, anxiety, mood swings or difficulty sleeping at night  PAIN SCALE: [  ] None  [ x ] Other-left hip pain  ROS Unable to obtain due to - [  ] Dementia  [  ] Lethargy  [  ] Sedated [  ] non verbal  REST OF REVIEW Of SYSTEM - [  ] Normal       Vital Signs Last 24 Hrs  T(C): 36.4 (2020 07:45), Max: 37.6 (2020 11:57)  T(F): 97.5 (2020 07:45), Max: 99.7 (2020 11:57)  HR: 74 (2020 07:45) (70 - 81)  BP: 130/71 (2020 07:45) (101/62 - 148/64)  BP(mean): --  RR: 18 (2020 07:45) (14 - 20)  SpO2: 100% (2020 07:45) (91% - 100%)  Finger Stick         @ 07: @ 07:00  --------------------------------------------------------  IN: 100 mL / OUT: 1060 mL / NET: -960 mL     @ 07: @ 11:41  --------------------------------------------------------  IN: 350 mL / OUT: 0 mL / NET: 350 mL          PHYSICAL EXAM:   GENERAL:  [x  ] NAD , [  x] well appearing, [  ] Agitated, [  ] Drowsy,  [  ] Lethargy, [  ] confused   HEAD:  [x  ] Normal, [  ] Other  EYES:  [x  ] EOMI, [x  ] PERRLA, [  ] conjunctiva and sclera clear normal, [  ] Other,  [  ] Pallor,[  ] Discharge  ENMT:  [x ] Normal, [x  ] Dry  mucous membranes, [ x ] Good dentition, [x  ] No Thrush  NECK:  [x  ] Supple, [ x ] No JVD, [ x ] Normal thyroid, [  ] Lymphadenopathy [  ] Other  CHEST/LUNG:  [x  ] Clear to auscultation bilaterally, [ x ] Breath Sounds equal ,  [x  ] No rales, [x  ] No rhonchi  [x  ]  No wheezing  HEART:  [x  ] Regular rate and rhythm, [  ] tachycardia, [  ] Bradycardia,  [  ] irregular  [ x ] No murmurs, No rubs, No gallops, [  ] PPM in place (Mfr:  )  ABDOMEN:  [x  ] Soft, [ x ] Nontender, [ x ] Nondistended, [ x ] No mass, [ x ] Bowel sounds present, [x  ] obese  NERVOUS SYSTEM:  [ x ] Alert & Oriented X 2-3, [ x ] Nonfocal  [  ] Confusion  [  ] Encephalopathic [  ] Sedated [  ] Unable to assess, [  ] Other-  EXTREMITIES: [ x ] 2+ Peripheral Pulses, No clubbing, No cyanosis,  [  ] edema B/L lower EXT. [  ] PVD stasis skin changes B/L Lower EXT  LYMPH: No lymphadenopathy noted  SKIN:  [ x ] No rashes or lesions, [  ] Pressure Ulcers, [  ] ecchymosis, [  ] Skin Tears, [  ] Other      DIET: Regular    LABS:                        7.7    14.40 )-----------( 142      ( 2020 09:26 )             24.7     2020 09:26    139    |  105    |  38     ----------------------------<  128    4.0     |  25     |  1.70     Ca    8.6        2020 09:26  Phos  3.9       2020 09:26  Mg     2.2       2020 09:26    TPro  6.4    /  Alb  3.2    /  TBili  0.5    /  DBili  x      /  AST  71     /  ALT  32     /  AlkPhos  61     2020 09:26    PT/INR - ( 2020 09:26 )   PT: 13.5 sec;   INR: 1.20 ratio         PTT - ( 2020 06:17 )  PTT:26.3 sec  Urinalysis Basic - ( 2020 08:40 )    Color: Yellow / Appearance: Clear / S.020 / pH: x  Gluc: x / Ketone: Small  / Bili: Negative / Urobili: Negative   Blood: x / Protein: 30 mg/dL / Nitrite: Negative   Leuk Esterase: Negative / RBC: 11-25 /HPF / WBC Negative   Sq Epi: x / Non Sq Epi: Occasional / Bacteria: Few                CARDIAC MARKERS ( 2020 06:17 )  x     / x     / 365 U/L / x     / x                 Anemia Panel:  Ferritin, Serum: 57 ng/mL (20 @ 11:06)  Folate, Serum: 4.6 ng/mL (20 @ 11:06)  Vitamin B12, Serum: 706 pg/mL (20 @ 11:06)  Iron Total, Serum: 23 ug/dL (20 @ 11:05)  Iron - Total Binding Capacity.: 356 ug/dL (20 @ 11:05)      Thyroid Panel:                RADIOLOGY & ADDITIONAL TESTS:      HEALTH ISSUES - PROBLEM Dx:  Anemia: Anemia  Leukocytosis: Leukocytosis  Need for prophylactic measure: Need for prophylactic measure  BPH (benign prostatic hyperplasia): BPH (benign prostatic hyperplasia)  High cholesterol: High cholesterol  CAD (coronary artery disease): CAD (coronary artery disease)  Hypertension: Hypertension  MAYRA (acute kidney injury): MAYRA (acute kidney injury)  Closed fracture of left hip, initial encounter: Closed fracture of left hip, initial encounter          Consultant(s) Notes Reviewed:  [x] YES     Care Discussed with [X] Consultants  [x  ] Patient  [ x ] Family  [  ]   [  ] Social Service  [ x ] RN, [  ] Physical Therapy  DVT PPX: [  ] Lovenox, [  ] S C Heparin, [  ] Coumadin, [  ] Xarelto, [  ] Eliquis, [  ] Pradaxa, [  ] IV Heparin drip, [ x] SCD [  ] Contraindication 2 to GI Bleed,[  ] Ambulation  Advanced directive: [x  ] None, [  ] DNR/DNI Patient is a 85y old  Male who presents with a chief complaint of Left hip fracture (subcapital left femoral neck fracture)  Patients cell #: [phone] (2020 08:49)      84y/o M with PMH of HTN, HLD, CAD (s/p open heart surgery 5yrs ago), chronic pain (s/p knee and rt hip replacement), BPH, and mild confusion (per wife) BIBEMS to [facility] ED s/p fall. Per wife, whom patient lives with, patient was ambulating with his cane when he lost balance and fell. Patient was in normal state of health prior to this time. Patient is often is "off balance." Wife was unable to get him off ground so called fire department. Fire department said he was fine, and put him into bed. Wife noticed his left hip looked like it was "sticking out" and leg appeared "out-turned." Wife was going to take patient for x-rays today, however early this morning, patient fell out of bed so she decided to call EMS. Patient w/ difficulty bearing weight. Denies LOC, headache, neck or back pain. Denies CP, palpitations, SOB, cough, n/v/d. Has been eating and drinking well. Patient has been quarantined with his wife in their home for 5 weeks.    Fever or chills: yes [  ]   no [ X]  Fatigue, malaise or generalized weakness: yes [  ]   no [ X ]  Shortness of breath/dyspnea: yes [  ]   no [  x]  Cough: yes [  ]   no [x  ]  Anorexia/po intolerance: yes [  ]   no [ x ]  Chest pain or chest tightness: yes [  ]   no [ x ]  Myalgias: yes [  ]   no [ x ]  Headache: yes [  ]   no [ x ]  Sore throat: yes [  ]   no [ x ]  Rhinorrhea: yes [  ]   no [x  ]  Abd pain: yes [  ]   no [ x ]  Nausea: yes [  ]   no [x  ]  Vomiting: yes [  ]   no [  x]  Diarrhea: yes [  ]   no [ x ]  Loss of sense of smell/anosmia: yes [  ]   no [x  ]  Conjunctivitis: yes [  ]   no [x  ]  Recent travel: yes [  ]   no [ x ]  Any sick contacts: yes [  ]   no [x  ]  Close contact with someone confirmed positive with COVID-19 / SARS-CoV2 in the last 14 days: yes [  ]   no [x  ]    In ED,  Vitals: T 98.9F, HR 83, /82, RR 16, SpO2 4L NC  Labs significant for: WBC 13.57, N/L ratio 15.9, H/H 9.4/30.1, BUN 46, Cr 1.8  EKG: NSR at 78bpm  CT head non cont:  no acute intracranial hemorrhage or mass effect.  CT cervical spine non cont: no evidence of cervical spine fracture.  CXR: no acute pulmonary process  Left hip xray: subcapital left femoral neck fracture, marked external rotation of hip, age indeterminant fracture deformity involving the left ischiopubic ramus  COVID PCR obtained while in ED.    INTERVAL HPI:  20: Pt seen and examined. Planned on OR today. cardio clearance obtained. Patient kept NPO pending procedure. COVID 19 -NEG, Pt unable to urinate. chronic anemia  20: Pt seen and examined. S/p Hemiarthroplasty POD #1. Seen by Heme/onc for anemia, Low H/H  likely multifactorial. start on IV Venofer x 3 doses. Mild leukocytosis.    OVERNIGHT EVENTS: none    Home Medications:  Amitiza 24 mcg oral capsule: 1 cap(s) orally 2 times a day (2020 11:40)  atorvastatin 10 mg oral tablet: 1 tab(s) orally once a day (2020 11:40)  escitalopram 20 mg oral tablet: 1 tab(s) orally once a day (2020 11:40)  finasteride 5 mg oral tablet: 1 tab(s) orally once a day (2020 11:40)  Metoprolol Succinate ER 50 mg oral tablet, extended release: 1 tab(s) orally once a day (2020 11:40)  traMADol 150 mg/24 hours oral capsule, extended release: 1 cap(s) orally once a day (2020 11:40)      MEDICATIONS  (STANDING):  acetaminophen   Tablet .. 650 milliGRAM(s) Oral every 6 hours  ascorbic acid 500 milliGRAM(s) Oral two times a day  atorvastatin 10 milliGRAM(s) Oral at bedtime  bisacodyl Suppository 10 milliGRAM(s) Rectal daily  escitalopram 20 milliGRAM(s) Oral daily  finasteride 5 milliGRAM(s) Oral daily  folic acid 1 milliGRAM(s) Oral daily  heparin   Injectable 5000 Unit(s) SubCutaneous every 8 hours  melatonin 3 milliGRAM(s) Oral at bedtime  metoprolol succinate ER 50 milliGRAM(s) Oral daily  multivitamin 1 Tablet(s) Oral daily  polyethylene glycol 3350 17 Gram(s) Oral daily  senna 2 Tablet(s) Oral at bedtime    MEDICATIONS  (PRN):  aluminum hydroxide/magnesium hydroxide/simethicone Suspension 30 milliLiter(s) Oral four times a day PRN Indigestion  benzocaine 15 mG/menthol 3.6 mG (Sugar-Free) Lozenge 1 Lozenge Oral every 3 hours PRN Sore Throat  magnesium hydroxide Suspension 30 milliLiter(s) Oral daily PRN Constipation  morphine  - Injectable 2 milliGRAM(s) IV Push every 6 hours PRN breakthrough pain  ondansetron Injectable 4 milliGRAM(s) IV Push every 6 hours PRN Nausea and/or Vomiting  traMADol 50 milliGRAM(s) Oral daily PRN 30 minutes prior to Physical Therapy  traMADol 50 milliGRAM(s) Oral every 6 hours PRN Severe Pain (7 - 10)  traMADol 25 milliGRAM(s) Oral every 4 hours PRN Moderate Pain (4 - 6)      Allergies    No Known Allergies    Social History:  Lives w/ wife.  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  Per wife, "easily confused."  Denies current or past tobacco use.  Infrequent etoh use.  CBD pills for pain. (2020 07:10)        REVIEW OF SYSTEMS: i am ok, Pain Left hip  CONSTITUTIONAL: No fever, No chills, No fatigue, No myalgia, No Body ache, No Weakness  EYES: No eye pain,  No visual disturbances, No discharge, NO Redness  ENMT:  No ear pain, No nose bleed, No vertigo; No sinus or throat pain, No Congestion  NECK: No pain, No stiffness  RESPIRATORY: No cough, wheezing, No  hemoptysis, No shortness of breath  CARDIOVASCULAR: No chest pain, palpitations  GASTROINTESTINAL: No abdominal or epigastric pain. No nausea, No vomiting; No diarrhea or constipation. [  ] BM  GENITOURINARY: No dysuria, No frequency, No urgency, No hematuria, or incontinence  NEUROLOGICAL: No headaches, No dizziness, No numbness, No tingling, No tremors, No weakness  EXT: No Swelling, No Pain, No Edema  SKIN:  [ x ] No itching, burning, rashes, or lesions   MUSCULOSKELETAL: No joint pain or swelling; No muscle pain, No back pain, No extremity pain  PSYCHIATRIC: No depression, anxiety, mood swings or difficulty sleeping at night  PAIN SCALE: [  ] None  [ x ] Other-left hip pain  ROS Unable to obtain due to - [  ] Dementia  [  ] Lethargy  [  ] Sedated [  ] non verbal  REST OF REVIEW Of SYSTEM - [x  ] Normal       Vital Signs Last 24 Hrs  T(C): 36.4 (2020 07:45), Max: 37.6 (2020 11:57)  T(F): 97.5 (2020 07:45), Max: 99.7 (2020 11:57)  HR: 74 (2020 07:45) (70 - 81)  BP: 130/71 (2020 07:45) (101/62 - 148/64)  BP(mean): --  RR: 18 (2020 07:45) (14 - 20)  SpO2: 100% (2020 07:45) (91% - 100%)  Finger Stick         @ 07: @ 07:00  --------------------------------------------------------  IN: 100 mL / OUT: 1060 mL / NET: -960 mL     @ : @ 11:41  --------------------------------------------------------  IN: 350 mL / OUT: 0 mL / NET: 350 mL          PHYSICAL EXAM: i am fine , wants to go home.  GENERAL:  [x  ] NAD , [  x] well appearing, [  ] Agitated, [  ] Drowsy,  [  ] Lethargy, [  ] confused   HEAD:  [x  ] Normal, [  ] Other  EYES:  [x  ] EOMI, [x  ] PERRLA, [  ] conjunctiva and sclera clear normal, [  ] Other,  [  ] Pallor,[  ] Discharge  ENMT:  [x ] Normal, [x  ] Dry  mucous membranes, [ x ] Good dentition, [x  ] No Thrush  NECK:  [x  ] Supple, [ x ] No JVD, [ x ] Normal thyroid, [  ] Lymphadenopathy [  ] Other  CHEST/LUNG:  [x  ] Clear to auscultation bilaterally, [ x ] Breath Sounds equal ,  [x  ] No rales, [x  ] No rhonchi  [x  ]  No wheezing  HEART:  [x  ] Regular rate and rhythm, [  ] tachycardia, [  ] Bradycardia,  [  ] irregular  [ x ] No murmurs, No rubs, No gallops, [  ] PPM in place (Mfr:  )  ABDOMEN:  [x  ] Soft, [ x ] Nontender, [ x ] Nondistended, [ x ] No mass, [ x ] Bowel sounds present, [x  ] obese  NERVOUS SYSTEM:  [ x ] Alert & Oriented X 2-, [ x ] Nonfocal  [x ] Confusion  [  ] Encephalopathic [  ] Sedated [  ] Unable to assess, [  ] Other-  EXTREMITIES: [ x ] 2+ Peripheral Pulses, No clubbing, No cyanosis, Left Hip dressing, mild swelling [  ] edema B/L lower EXT. [  ] PVD stasis skin changes B/L Lower EXT  LYMPH: No lymphadenopathy noted  SKIN:  [ x ] No rashes or lesions, [  ] Pressure Ulcers, [  ] ecchymosis, [  ] Skin Tears, [  ] Other      DIET: Regular    LABS:                        7.7    14.40 )-----------( 142      ( 2020 09:26 )             24.7     2020 09:26    139    |  105    |  38     ----------------------------<  128    4.0     |  25     |  1.70     Ca    8.6        2020 09:26  Phos  3.9       2020 09:26  Mg     2.2       2020 09:26    TPro  6.4    /  Alb  3.2    /  TBili  0.5    /  DBili  x      /  AST  71     /  ALT  32     /  AlkPhos  61     2020 09:26    PT/INR - ( 2020 09:26 )   PT: 13.5 sec;   INR: 1.20 ratio         PTT - ( 2020 06:17 )  PTT:26.3 sec  Urinalysis Basic - ( 2020 08:40 )    Color: Yellow / Appearance: Clear / S.020 / pH: x  Gluc: x / Ketone: Small  / Bili: Negative / Urobili: Negative   Blood: x / Protein: 30 mg/dL / Nitrite: Negative   Leuk Esterase: Negative / RBC: 11-25 /HPF / WBC Negative   Sq Epi: x / Non Sq Epi: Occasional / Bacteria: Few        CARDIAC MARKERS ( 2020 06:17 )  x     / x     / 365 U/L / x     / x            Anemia Panel:  Ferritin, Serum: 57 ng/mL (20 @ 11:06)  Folate, Serum: 4.6 ng/mL (20 @ 11:06)  Vitamin B12, Serum: 706 pg/mL (20 @ 11:06)  Iron Total, Serum: 23 ug/dL (20 @ 11:05)  Iron - Total Binding Capacity.: 356 ug/dL (20 @ 11:05)      Thyroid Panel:      RADIOLOGY & ADDITIONAL TESTS:NONE      HEALTH ISSUES - PROBLEM Dx:  Anemia: Anemia  Leukocytosis: Leukocytosis  Need for prophylactic measure: Need for prophylactic measure  BPH (benign prostatic hyperplasia): BPH (benign prostatic hyperplasia)  High cholesterol: High cholesterol  CAD (coronary artery disease): CAD (coronary artery disease)  Hypertension: Hypertension  MAYRA (acute kidney injury): MAYRA (acute kidney injury)  Closed fracture of left hip, initial encounter: Closed fracture of left hip, initial encounter          Consultant(s) Notes Reviewed:  [x] YES     Care Discussed with [X] Consultants  [x  ] Patient  [ x ] Family-wife [  ]   [  ] Social Service  [ x ] RN, [  ] Physical Therapy  DVT PPX: [  ] Lovenox, [  ] S C Heparin, [  ] Coumadin, [  ] Xarelto, [  ] Eliquis, [  ] Pradaxa, [  ] IV Heparin drip, [ x] SCD [  ] Contraindication 2 to GI Bleed,[  ] Ambulation  Advanced directive: [x  ] None, [  ] DNR/DNI

## 2020-04-24 NOTE — PROGRESS NOTE ADULT - SUBJECTIVE AND OBJECTIVE BOX
Interval History:    Chart reviewed and events noted;   Overnight events:    MEDICATIONS  (STANDING):  acetaminophen   Tablet .. 650 milliGRAM(s) Oral every 6 hours  ascorbic acid 500 milliGRAM(s) Oral two times a day  atorvastatin 10 milliGRAM(s) Oral at bedtime  bisacodyl Suppository 10 milliGRAM(s) Rectal daily  escitalopram 20 milliGRAM(s) Oral daily  finasteride 5 milliGRAM(s) Oral daily  folic acid 1 milliGRAM(s) Oral daily  heparin   Injectable 5000 Unit(s) SubCutaneous every 8 hours  iron sucrose IVPB 100 milliGRAM(s) IV Intermittent every 24 hours  melatonin 3 milliGRAM(s) Oral at bedtime  metoprolol succinate ER 50 milliGRAM(s) Oral daily  multivitamin 1 Tablet(s) Oral daily  polyethylene glycol 3350 17 Gram(s) Oral daily  senna 2 Tablet(s) Oral at bedtime    MEDICATIONS  (PRN):  aluminum hydroxide/magnesium hydroxide/simethicone Suspension 30 milliLiter(s) Oral four times a day PRN Indigestion  benzocaine 15 mG/menthol 3.6 mG (Sugar-Free) Lozenge 1 Lozenge Oral every 3 hours PRN Sore Throat  magnesium hydroxide Suspension 30 milliLiter(s) Oral daily PRN Constipation  morphine  - Injectable 2 milliGRAM(s) IV Push every 6 hours PRN breakthrough pain  ondansetron Injectable 4 milliGRAM(s) IV Push every 6 hours PRN Nausea and/or Vomiting  traMADol 50 milliGRAM(s) Oral daily PRN 30 minutes prior to Physical Therapy  traMADol 50 milliGRAM(s) Oral every 6 hours PRN Severe Pain (7 - 10)  traMADol 25 milliGRAM(s) Oral every 4 hours PRN Moderate Pain (4 - 6)      Vital Signs Last 24 Hrs  T(C): 36.8 (24 Apr 2020 18:10), Max: 37.1 (23 Apr 2020 18:21)  T(F): 98.3 (24 Apr 2020 18:10), Max: 98.7 (23 Apr 2020 18:21)  HR: 71 (24 Apr 2020 18:10) (70 - 81)  BP: 134/69 (24 Apr 2020 18:10) (101/62 - 158/80)  BP(mean): --  RR: 18 (24 Apr 2020 18:10) (14 - 20)  SpO2: 99% (24 Apr 2020 18:10) (93% - 100%)    PHYSICAL EXAM    LABS:  CBC Full  -  ( 24 Apr 2020 09:26 )  WBC Count : 14.40 K/uL  RBC Count : 2.73 M/uL  Hemoglobin : 7.7 g/dL  Hematocrit : 24.7 %  Platelet Count - Automated : 142 K/uL  Mean Cell Volume : 90.5 fl  Mean Cell Hemoglobin : 28.2 pg  Mean Cell Hemoglobin Concentration : 31.2 gm/dL  Auto Neutrophil # : 11.82 K/uL  Auto Lymphocyte # : 0.95 K/uL  Auto Monocyte # : 1.53 K/uL  Auto Eosinophil # : 0.00 K/uL  Auto Basophil # : 0.02 K/uL  Auto Neutrophil % : 82.1 %  Auto Lymphocyte % : 6.6 %  Auto Monocyte % : 10.6 %  Auto Eosinophil % : 0.0 %  Auto Basophil % : 0.1 %    04-24    139  |  105  |  38<H>  ----------------------------<  128<H>  4.0   |  25  |  1.70<H>    Ca    8.6      24 Apr 2020 09:26  Phos  3.9     04-24  Mg     2.2     04-24    TPro  6.4  /  Alb  3.2<L>  /  TBili  0.5  /  DBili  x   /  AST  71<H>  /  ALT  32  /  AlkPhos  61  04-24    PT/INR - ( 24 Apr 2020 09:26 )   PT: 13.5 sec;   INR: 1.20 ratio         PTT - ( 23 Apr 2020 06:17 )  PTT:26.3 sec    fe studies  Ferritin, Serum: 57 ng/mL (04-24 @ 11:06)  Iron - Total Binding Capacity.: 356 ug/dL (04-24 @ 11:05)      WBC trend  14.40 K/uL (04-24-20 @ 09:26)  13.29 K/uL (04-23-20 @ 18:02)  13.57 K/uL (04-23-20 @ 06:17)      Hgb trend  7.7 g/dL (04-24-20 @ 09:26)  8.6 g/dL (04-23-20 @ 18:02)  9.4 g/dL (04-23-20 @ 06:17)      plt trend  142 K/uL (04-24-20 @ 09:26)  150 K/uL (04-23-20 @ 18:02)  168 K/uL (04-23-20 @ 06:17)        RADIOLOGY & ADDITIONAL STUDIES:

## 2020-04-24 NOTE — OCCUPATIONAL THERAPY INITIAL EVALUATION ADULT - LEVEL OF INDEPENDENCE, OT EVAL
assist for LB due to hip precautions/maximum assist (25% patients effort)/moderate assist (50% patients effort)

## 2020-04-24 NOTE — PHYSICAL THERAPY INITIAL EVALUATION ADULT - PERTINENT HX OF CURRENT PROBLEM, REHAB EVAL
84y/o M with PMH of HTN, HLD, Non obstructive CAD,BPH, and mild confusion (per wife) BIBEMS to [facility] ED s/p fall. Per wife, whom patient lives with, patient was ambulating with his cane when he lost balance and fell. Wife was unable to get him off ground, Wife noticed his left hip looked like it was "sticking out" and leg appeared "out-turned.

## 2020-04-24 NOTE — PROGRESS NOTE ADULT - ASSESSMENT
Anemia multifactorial in etiology related to recent left hip fracture and underlying chronic disease. has renal insufficiency on labs.  Patient appears stable s/p surgery  fe studies are equivical and with post surgery and renal insufficiency to give venofer for 3 days  hgb 7.7 hemodynamically stable and will hold transfusion and observe    Recommendations:  1.  follow CBC and transfuse as indicated  2.  will give venofer 100mg po daily for 3 days  3.  continue post op orthopedic and medical care  discussed with Dr. Jin

## 2020-04-24 NOTE — PROGRESS NOTE ADULT - PROBLEM SELECTOR PLAN 9
- continue home med finasteride Patient w/ no sick contacts and w/o cough, SOB, fever/chills  - Hemodynamically stable, afebrile  - COVID PCR negative  - F/u d-dimer, crp, procal in AM

## 2020-04-25 DIAGNOSIS — R79.89 OTHER SPECIFIED ABNORMAL FINDINGS OF BLOOD CHEMISTRY: ICD-10-CM

## 2020-04-25 LAB
ANION GAP SERPL CALC-SCNC: 7 MMOL/L — SIGNIFICANT CHANGE UP (ref 5–17)
BUN SERPL-MCNC: 36 MG/DL — HIGH (ref 7–23)
CALCIUM SERPL-MCNC: 8.5 MG/DL — SIGNIFICANT CHANGE UP (ref 8.5–10.1)
CHLORIDE SERPL-SCNC: 105 MMOL/L — SIGNIFICANT CHANGE UP (ref 96–108)
CO2 SERPL-SCNC: 27 MMOL/L — SIGNIFICANT CHANGE UP (ref 22–31)
CREAT SERPL-MCNC: 1.5 MG/DL — HIGH (ref 0.5–1.3)
D DIMER BLD IA.RAPID-MCNC: 831 NG/ML DDU — HIGH
GLUCOSE SERPL-MCNC: 99 MG/DL — SIGNIFICANT CHANGE UP (ref 70–99)
HCT VFR BLD CALC: 23.7 % — LOW (ref 39–50)
HCT VFR BLD CALC: 26.6 % — LOW (ref 39–50)
HGB BLD-MCNC: 7.4 G/DL — LOW (ref 13–17)
HGB BLD-MCNC: 8.2 G/DL — LOW (ref 13–17)
MCHC RBC-ENTMCNC: 28.5 PG — SIGNIFICANT CHANGE UP (ref 27–34)
MCHC RBC-ENTMCNC: 31.2 GM/DL — LOW (ref 32–36)
MCV RBC AUTO: 91.2 FL — SIGNIFICANT CHANGE UP (ref 80–100)
NRBC # BLD: 0 /100 WBCS — SIGNIFICANT CHANGE UP (ref 0–0)
PLATELET # BLD AUTO: 142 K/UL — LOW (ref 150–400)
POTASSIUM SERPL-MCNC: 4.3 MMOL/L — SIGNIFICANT CHANGE UP (ref 3.5–5.3)
POTASSIUM SERPL-SCNC: 4.3 MMOL/L — SIGNIFICANT CHANGE UP (ref 3.5–5.3)
RBC # BLD: 2.6 M/UL — LOW (ref 4.2–5.8)
RBC # FLD: 12.9 % — SIGNIFICANT CHANGE UP (ref 10.3–14.5)
SODIUM SERPL-SCNC: 139 MMOL/L — SIGNIFICANT CHANGE UP (ref 135–145)
WBC # BLD: 12.96 K/UL — HIGH (ref 3.8–10.5)
WBC # FLD AUTO: 12.96 K/UL — HIGH (ref 3.8–10.5)

## 2020-04-25 PROCEDURE — 99232 SBSQ HOSP IP/OBS MODERATE 35: CPT

## 2020-04-25 RX ORDER — ACETAMINOPHEN 500 MG
650 TABLET ORAL ONCE
Refills: 0 | Status: COMPLETED | OUTPATIENT
Start: 2020-04-25 | End: 2020-04-25

## 2020-04-25 RX ORDER — DIPHENHYDRAMINE HCL 50 MG
25 CAPSULE ORAL ONCE
Refills: 0 | Status: COMPLETED | OUTPATIENT
Start: 2020-04-25 | End: 2020-04-25

## 2020-04-25 RX ADMIN — Medication 650 MILLIGRAM(S): at 22:26

## 2020-04-25 RX ADMIN — SENNA PLUS 2 TABLET(S): 8.6 TABLET ORAL at 22:24

## 2020-04-25 RX ADMIN — Medication 650 MILLIGRAM(S): at 10:20

## 2020-04-25 RX ADMIN — Medication 1 TABLET(S): at 11:36

## 2020-04-25 RX ADMIN — Medication 1 MILLIGRAM(S): at 11:36

## 2020-04-25 RX ADMIN — TRAMADOL HYDROCHLORIDE 50 MILLIGRAM(S): 50 TABLET ORAL at 20:05

## 2020-04-25 RX ADMIN — POLYETHYLENE GLYCOL 3350 17 GRAM(S): 17 POWDER, FOR SOLUTION ORAL at 11:36

## 2020-04-25 RX ADMIN — FINASTERIDE 5 MILLIGRAM(S): 5 TABLET, FILM COATED ORAL at 11:37

## 2020-04-25 RX ADMIN — HEPARIN SODIUM 5000 UNIT(S): 5000 INJECTION INTRAVENOUS; SUBCUTANEOUS at 22:26

## 2020-04-25 RX ADMIN — ESCITALOPRAM OXALATE 20 MILLIGRAM(S): 10 TABLET, FILM COATED ORAL at 11:36

## 2020-04-25 RX ADMIN — Medication 500 MILLIGRAM(S): at 17:22

## 2020-04-25 RX ADMIN — ATORVASTATIN CALCIUM 10 MILLIGRAM(S): 80 TABLET, FILM COATED ORAL at 22:24

## 2020-04-25 RX ADMIN — Medication 500 MILLIGRAM(S): at 06:11

## 2020-04-25 RX ADMIN — Medication 650 MILLIGRAM(S): at 06:13

## 2020-04-25 RX ADMIN — HEPARIN SODIUM 5000 UNIT(S): 5000 INJECTION INTRAVENOUS; SUBCUTANEOUS at 17:23

## 2020-04-25 RX ADMIN — Medication 25 MILLIGRAM(S): at 10:20

## 2020-04-25 RX ADMIN — Medication 50 MILLIGRAM(S): at 06:11

## 2020-04-25 RX ADMIN — Medication 3 MILLIGRAM(S): at 22:25

## 2020-04-25 RX ADMIN — Medication 650 MILLIGRAM(S): at 17:22

## 2020-04-25 RX ADMIN — HEPARIN SODIUM 5000 UNIT(S): 5000 INJECTION INTRAVENOUS; SUBCUTANEOUS at 06:11

## 2020-04-25 RX ADMIN — Medication 650 MILLIGRAM(S): at 11:37

## 2020-04-25 RX ADMIN — IRON SUCROSE 100 MILLIGRAM(S): 20 INJECTION, SOLUTION INTRAVENOUS at 22:24

## 2020-04-25 NOTE — PROGRESS NOTE ADULT - PROBLEM SELECTOR PLAN 1
Admit to general medical floors  - Left hip xray: subcapital left femoral neck fracture, marked external rotation of hip, age indeterminant fracture deformity involving the left ischiopubic ramus  - CT head and cervical spine w/ no acute findings   - Pt w/ left hip fx s/p fall  - POD 2 s/p hip hemiarthroplasty  - PT consulted - patient to received 50 mg tramadol 30 minutes prior to PT  - Bedrest, fall risk protocol   - Spoke to family; who reports that patient had severe AMS and agitation when given dilaudid or oxycodone. Will order tramadol 50 PRN for severe, tramadol 25 PRN for moderate, tylenol Q6H for pain, and morphine for breakthrough pain.

## 2020-04-25 NOTE — PROGRESS NOTE ADULT - PROBLEM SELECTOR PLAN 5
BUN 46, Cr 1.8 on admission, Encourage PO fluids  - Cr 1.5, GFR 42  - patient likely has underlying ckd   - Per wife, no diagnosed hx of kidney disease  - continue to monitor

## 2020-04-25 NOTE — PROGRESS NOTE ADULT - ASSESSMENT
84y/o M with PMH of HTN, HLD, CAD (s/p open heart surgery 5yrs ago), chronic pain (s/p knee and rt hip replacement), BPH, and mild confusion (per wife) BIBEMS to [facility] ED s/p fall, admitted for left hip fracture. S/p hip hemiarthroplasty POD2

## 2020-04-25 NOTE — PROGRESS NOTE ADULT - ASSESSMENT
Anemia multifactorial in etiology related to recent left hip fracture and underlying chronic disease. has renal insufficiency on labs.  Patient appears stable s/p surgery  fe studies are equivical and with post surgery and renal insufficiency to give venofer for 3 days  hgb 7.4 hemodynamically stable but post op and should receive 1 unit PRBC    Recommendations:  1.  follow CBC   2.  continue venofer  3.  continue post op orthopedic and medical care  4.  transfuse 1 unit PRBC today  discussed with Dr. Jin

## 2020-04-25 NOTE — PROGRESS NOTE ADULT - PROBLEM SELECTOR PLAN 4
- admission d-dimer elevated likely 2/2 acute injury and surgery as well as underlying ckd, and age   - patient HD stable, saturating well on room air, nontachycardic, low suspicion for PE at this time  - will f/u repeat d-dimer in AM - admission d-dimer elevated likely 2/2 acute injury and surgery as well as underlying ckd, and age   - patient HD stable, saturating well on room air, non tachycardic, low suspicion for PE at this time  - will f/u repeat d-dimer in AM

## 2020-04-25 NOTE — PROGRESS NOTE ADULT - SUBJECTIVE AND OBJECTIVE BOX
Patient is a 85y old  Male who presents with a chief complaint of Left hip fracture (subcapital left femoral neck fracture)  Patients cell #: [phone] (25 Apr 2020 09:48)      86y/o M with PMH of HTN, HLD, CAD (s/p open heart surgery 5yrs ago), chronic pain (s/p knee and rt hip replacement), BPH, and mild confusion (per wife) BIBEMS to [facility] ED s/p fall. Per wife, whom patient lives with, patient was ambulating with his cane when he lost balance and fell. Patient was in normal state of health prior to this time. Patient is often is "off balance." Wife was unable to get him off ground so called fire department. Fire department said he was fine, and put him into bed. Wife noticed his left hip looked like it was "sticking out" and leg appeared "out-turned." Wife was going to take patient for x-rays today, however early this morning, patient fell out of bed so she decided to call EMS. Patient w/ difficulty bearing weight. Denies LOC, headache, neck or back pain. Denies CP, palpitations, SOB, cough, n/v/d. Has been eating and drinking well. Patient has been quarantined with his wife in their home for 5 weeks.    Fever or chills: yes [  ]   no [ X]  Fatigue, malaise or generalized weakness: yes [  ]   no [ X ]  Shortness of breath/dyspnea: yes [  ]   no [  x]  Cough: yes [  ]   no [x  ]  Anorexia/po intolerance: yes [  ]   no [ x ]  Chest pain or chest tightness: yes [  ]   no [ x ]  Myalgias: yes [  ]   no [ x ]  Headache: yes [  ]   no [ x ]  Sore throat: yes [  ]   no [ x ]  Rhinorrhea: yes [  ]   no [x  ]  Abd pain: yes [  ]   no [ x ]  Nausea: yes [  ]   no [x  ]  Vomiting: yes [  ]   no [  x]  Diarrhea: yes [  ]   no [ x ]  Loss of sense of smell/anosmia: yes [  ]   no [x  ]  Conjunctivitis: yes [  ]   no [x  ]  Recent travel: yes [  ]   no [ x ]  Any sick contacts: yes [  ]   no [x  ]  Close contact with someone confirmed positive with COVID-19 / SARS-CoV2 in the last 14 days: yes [  ]   no [x  ]    In ED,  Vitals: T 98.9F, HR 83, /82, RR 16, SpO2 4L NC  Labs significant for: WBC 13.57, N/L ratio 15.9, H/H 9.4/30.1, BUN 46, Cr 1.8  EKG: NSR at 78bpm  CT head non cont:  no acute intracranial hemorrhage or mass effect.  CT cervical spine non cont: no evidence of cervical spine fracture.  CXR: no acute pulmonary process  Left hip xray: subcapital left femoral neck fracture, marked external rotation of hip, age indeterminant fracture deformity involving the left ischiopubic ramus  COVID PCR obtained while in ED.    INTERVAL HPI:  4/23/20: Pt seen and examined. Planned on OR today. cardio clearance obtained. Patient kept NPO pending procedure. COVID 19 -NEG, Pt unable to urinate. chronic anemia  4/24/20: Pt seen and examined. S/p Hemiarthroplasty POD #1. Seen by Heme/onc for anemia, Low H/H  likely multifactorial. start on IV Venofer x 3 doses. Mild leukocytosis.  4/25/20: Pt seen and examined. S/p Hemiarthroplasty POD #2. H/H low today requiring 1x PRBC transfusion and started on venofer x3 days by heme.     OVERNIGHT EVENTS: none    Home Medications:  Amitiza 24 mcg oral capsule: 1 cap(s) orally 2 times a day (23 Apr 2020 11:40)  atorvastatin 10 mg oral tablet: 1 tab(s) orally once a day (23 Apr 2020 11:40)  escitalopram 20 mg oral tablet: 1 tab(s) orally once a day (23 Apr 2020 11:40)  finasteride 5 mg oral tablet: 1 tab(s) orally once a day (23 Apr 2020 11:40)  Metoprolol Succinate ER 50 mg oral tablet, extended release: 1 tab(s) orally once a day (23 Apr 2020 11:40)  traMADol 150 mg/24 hours oral capsule, extended release: 1 cap(s) orally once a day (23 Apr 2020 11:40)      MEDICATIONS  (STANDING):  acetaminophen   Tablet .. 650 milliGRAM(s) Oral every 6 hours  ascorbic acid 500 milliGRAM(s) Oral two times a day  atorvastatin 10 milliGRAM(s) Oral at bedtime  escitalopram 20 milliGRAM(s) Oral daily  finasteride 5 milliGRAM(s) Oral daily  folic acid 1 milliGRAM(s) Oral daily  heparin   Injectable 5000 Unit(s) SubCutaneous every 8 hours  iron sucrose Injectable 100 milliGRAM(s) IV Push every 24 hours  melatonin 3 milliGRAM(s) Oral at bedtime  metoprolol succinate ER 50 milliGRAM(s) Oral daily  multivitamin 1 Tablet(s) Oral daily  polyethylene glycol 3350 17 Gram(s) Oral daily  senna 2 Tablet(s) Oral at bedtime    MEDICATIONS  (PRN):  aluminum hydroxide/magnesium hydroxide/simethicone Suspension 30 milliLiter(s) Oral four times a day PRN Indigestion  benzocaine 15 mG/menthol 3.6 mG (Sugar-Free) Lozenge 1 Lozenge Oral every 3 hours PRN Sore Throat  magnesium hydroxide Suspension 30 milliLiter(s) Oral daily PRN Constipation  morphine  - Injectable 2 milliGRAM(s) IV Push every 6 hours PRN breakthrough pain  ondansetron Injectable 4 milliGRAM(s) IV Push every 6 hours PRN Nausea and/or Vomiting  traMADol 50 milliGRAM(s) Oral daily PRN 30 minutes prior to Physical Therapy  traMADol 50 milliGRAM(s) Oral every 6 hours PRN Severe Pain (7 - 10)  traMADol 25 milliGRAM(s) Oral every 4 hours PRN Moderate Pain (4 - 6)      Allergies    No Known Allergies    Social History:  Lives w/ wife.  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  Ambulates with cane.  Per wife, "easily confused."  Denies current or past tobacco use.  Infrequent etoh use.  CBD pills for pain. (23 Apr 2020 07:10)      REVIEW OF SYSTEMS: i am ok, Pain Left hip  CONSTITUTIONAL: No fever, No chills, No fatigue, No myalgia, No Body ache, No Weakness  EYES: No eye pain,  No visual disturbances, No discharge, NO Redness  ENMT:  No ear pain, No nose bleed, No vertigo; No sinus or throat pain, No Congestion  NECK: No pain, No stiffness  RESPIRATORY: No cough, wheezing, No  hemoptysis, No shortness of breath  CARDIOVASCULAR: No chest pain, palpitations  GASTROINTESTINAL: No abdominal or epigastric pain. No nausea, No vomiting; No diarrhea or constipation. [  ] BM  GENITOURINARY: No dysuria, No frequency, No urgency, No hematuria, or incontinence  NEUROLOGICAL: No headaches, No dizziness, No numbness, No tingling, No tremors, No weakness  EXT: No Swelling, No Pain, No Edema  SKIN:  [ x ] No itching, burning, rashes, or lesions   MUSCULOSKELETAL: No joint pain or swelling; No muscle pain, No back pain, No extremity pain  PSYCHIATRIC: No depression, anxiety, mood swings or difficulty sleeping at night  PAIN SCALE: [  ] None  [ x ] Other-left hip pain  ROS Unable to obtain due to - [  ] Dementia  [  ] Lethargy  [  ] Sedated [  ] non verbal  REST OF REVIEW Of SYSTEM - [x  ] Normal       Vital Signs Last 24 Hrs  T(C): 36.4 (25 Apr 2020 04:45), Max: 36.8 (24 Apr 2020 18:10)  T(F): 97.6 (25 Apr 2020 04:45), Max: 98.3 (24 Apr 2020 18:10)  HR: 86 (25 Apr 2020 04:45) (71 - 86)  BP: 166/75 (25 Apr 2020 04:45) (134/69 - 166/75)  BP(mean): --  RR: 18 (25 Apr 2020 04:45) (18 - 18)  SpO2: 98% (25 Apr 2020 04:45) (98% - 99%)        I&O's Summary    24 Apr 2020 07:01  -  25 Apr 2020 07:00  --------------------------------------------------------  IN: 550 mL / OUT: 1000 mL / NET: -450 mL          PHYSICAL EXAM:   GENERAL:  [x  ] NAD , [  x] well appearing, [  ] Agitated, [  ] Drowsy,  [  ] Lethargy, [  ] confused   HEAD:  [x  ] Normal, [  ] Other  EYES:  [x  ] EOMI, [x  ] PERRLA, [  ] conjunctiva and sclera clear normal, [  ] Other,  [  ] Pallor,[  ] Discharge  ENMT:  [x ] Normal, [x  ] Dry  mucous membranes, [ x ] Good dentition, [x  ] No Thrush  NECK:  [x  ] Supple, [ x ] No JVD, [ x ] Normal thyroid, [  ] Lymphadenopathy [  ] Other  CHEST/LUNG:  [x  ] Clear to auscultation bilaterally, [ x ] Breath Sounds equal ,  [x  ] No rales, [x  ] No rhonchi  [x  ]  No wheezing  HEART:  [x  ] Regular rate and rhythm, [  ] tachycardia, [  ] Bradycardia,  [  ] irregular  [ x ] No murmurs, No rubs, No gallops, [  ] PPM in place (Mfr:  )  ABDOMEN:  [x  ] Soft, [ x ] Nontender, [ x ] Nondistended, [ x ] No mass, [ x ] Bowel sounds present, [x  ] obese  NERVOUS SYSTEM:  [ x ] Alert & Oriented X 2-, [ x ] Nonfocal  [x ] Confusion  [  ] Encephalopathic [  ] Sedated [  ] Unable to assess, [  ] Other-  EXTREMITIES: [ x ] 2+ Peripheral Pulses, No clubbing, No cyanosis, Left Hip dressing, mild swelling [  ] edema B/L lower EXT. [  ] PVD stasis skin changes B/L Lower EXT  LYMPH: No lymphadenopathy noted  SKIN:  [ x ] No rashes or lesions, [  ] Pressure Ulcers, [  ] ecchymosis, [  ] Skin Tears, [  ] Other      DIET: Regular    LABS:                          7.4    12.96 )-----------( 142      ( 25 Apr 2020 07:10 )             23.7       04-25    139  |  105  |  36<H>  ----------------------------<  99  4.3   |  27  |  1.50<H>    Ca    8.5      25 Apr 2020 07:10  Phos  3.9     04-24  Mg     2.2     04-24    TPro  6.4  /  Alb  3.2<L>  /  TBili  0.5  /  DBili  x   /  AST  71<H>  /  ALT  32  /  AlkPhos  61  04-24          Anemia Panel:  Ferritin, Serum: 57 ng/mL (04-24-20 @ 11:06)  Folate, Serum: 4.6 ng/mL (04-24-20 @ 11:06)  Vitamin B12, Serum: 706 pg/mL (04-24-20 @ 11:06)  Iron Total, Serum: 23 ug/dL (04-24-20 @ 11:05)  Iron - Total Binding Capacity.: 356 ug/dL (04-24-20 @ 11:05)      Thyroid Panel:      RADIOLOGY & ADDITIONAL TESTS  < from: Xray Hip w/ Pelvis Min 4 Views, Left (04.24.20 @ 13:13) >  EXAM:  XR HIP WITH PELV MIN 4V LT                            PROCEDURE DATE:  04/24/2020          INTERPRETATION:  Evaluate left hip hemiarthroplasty    AP pelvis and 2 views left hip.    There is a left hip hemiarthroplasty with intact hardware satisfactory alignment. Postoperative clips left inguinal region. There is a right THR with normal appearance. Osseous pelvic structures intact unremarkable. Degenerative change in the visualized lower lumbar spine.    Impression: Satisfactory postoperative appearance left hip hemiarthroplasty        JG GREENE M.D., ATTENDING RADIOLOGIST  This document has been electronically signed. Apr 24 2020 11:36AM    < end of copied text >        HEALTH ISSUES - PROBLEM Dx:  Anemia: Anemia  Leukocytosis: Leukocytosis  Need for prophylactic measure: Need for prophylactic measure  BPH (benign prostatic hyperplasia): BPH (benign prostatic hyperplasia)  High cholesterol: High cholesterol  CAD (coronary artery disease): CAD (coronary artery disease)  Hypertension: Hypertension  MAYRA (acute kidney injury): MAYRA (acute kidney injury)  Closed fracture of left hip, initial encounter: Closed fracture of left hip, initial encounter          Consultant(s) Notes Reviewed:  [x] YES     Care Discussed with [X] Consultants  [x  ] Patient  [ x ] Family-wife [  ]   [  ] Social Service  [ x ] RN, [  ] Physical Therapy  DVT PPX: [  ] Lovenox, [  ] S C Heparin, [  ] Coumadin, [  ] Xarelto, [  ] Eliquis, [  ] Pradaxa, [  ] IV Heparin drip, [ x] SCD [  ] Contraindication 2 to GI Bleed,[  ] Ambulation  Advanced directive: [x  ] None, [  ] DNR/DNI Patient is a 85y old  Male who presents with a chief complaint of Left hip fracture (subcapital left femoral neck fracture)  Patients cell #: [phone] (25 Apr 2020 09:48)      84y/o M with PMH of HTN, HLD, CAD (s/p open heart surgery 5yrs ago), chronic pain (s/p knee and rt hip replacement), BPH, and mild confusion (per wife) BIBEMS to [facility] ED s/p fall. Per wife, whom patient lives with, patient was ambulating with his cane when he lost balance and fell. Patient was in normal state of health prior to this time. Patient is often is "off balance." Wife was unable to get him off ground so called fire department. Fire department said he was fine, and put him into bed. Wife noticed his left hip looked like it was "sticking out" and leg appeared "out-turned." Wife was going to take patient for x-rays today, however early this morning, patient fell out of bed so she decided to call EMS. Patient w/ difficulty bearing weight. Denies LOC, headache, neck or back pain. Denies CP, palpitations, SOB, cough, n/v/d. Has been eating and drinking well. Patient has been quarantined with his wife in their home for 5 weeks.    Fever or chills: yes [  ]   no [ X]  Fatigue, malaise or generalized weakness: yes [  ]   no [ X ]  Shortness of breath/dyspnea: yes [  ]   no [  x]  Cough: yes [  ]   no [x  ]  Anorexia/po intolerance: yes [  ]   no [ x ]  Chest pain or chest tightness: yes [  ]   no [ x ]  Myalgias: yes [  ]   no [ x ]  Headache: yes [  ]   no [ x ]  Sore throat: yes [  ]   no [ x ]  Rhinorrhea: yes [  ]   no [x  ]  Abd pain: yes [  ]   no [ x ]  Nausea: yes [  ]   no [x  ]  Vomiting: yes [  ]   no [  x]  Diarrhea: yes [  ]   no [ x ]  Loss of sense of smell/anosmia: yes [  ]   no [x  ]  Conjunctivitis: yes [  ]   no [x  ]  Recent travel: yes [  ]   no [ x ]  Any sick contacts: yes [  ]   no [x  ]  Close contact with someone confirmed positive with COVID-19 / SARS-CoV2 in the last 14 days: yes [  ]   no [x  ]    In ED,  Vitals: T 98.9F, HR 83, /82, RR 16, SpO2 4L NC  Labs significant for: WBC 13.57, N/L ratio 15.9, H/H 9.4/30.1, BUN 46, Cr 1.8  EKG: NSR at 78bpm  CT head non cont:  no acute intracranial hemorrhage or mass effect.  CT cervical spine non cont: no evidence of cervical spine fracture.  CXR: no acute pulmonary process  Left hip xray: subcapital left femoral neck fracture, marked external rotation of hip, age indeterminant fracture deformity involving the left ischiopubic ramus  COVID PCR obtained while in ED.    INTERVAL HPI:  4/23/20: Pt seen and examined. Planned on OR today. cardio clearance obtained. Patient kept NPO pending procedure. COVID 19 -NEG, Pt unable to urinate. chronic anemia  4/24/20: Pt seen and examined. S/p Hemiarthroplasty POD #1. Seen by Heme/onc for anemia, Low H/H  likely multifactorial. start on IV Venofer x 3 doses. Mild leukocytosis.  4/25/20: Pt seen and examined. S/p Hemiarthroplasty POD #2. H/H low today requiring 1x PRBC transfusion and started on venofer x3 days by heme.     OVERNIGHT EVENTS: none    Home Medications:  Amitiza 24 mcg oral capsule: 1 cap(s) orally 2 times a day (23 Apr 2020 11:40)  atorvastatin 10 mg oral tablet: 1 tab(s) orally once a day (23 Apr 2020 11:40)  escitalopram 20 mg oral tablet: 1 tab(s) orally once a day (23 Apr 2020 11:40)  finasteride 5 mg oral tablet: 1 tab(s) orally once a day (23 Apr 2020 11:40)  Metoprolol Succinate ER 50 mg oral tablet, extended release: 1 tab(s) orally once a day (23 Apr 2020 11:40)  traMADol 150 mg/24 hours oral capsule, extended release: 1 cap(s) orally once a day (23 Apr 2020 11:40)      MEDICATIONS  (STANDING):  acetaminophen   Tablet .. 650 milliGRAM(s) Oral every 6 hours  ascorbic acid 500 milliGRAM(s) Oral two times a day  atorvastatin 10 milliGRAM(s) Oral at bedtime  escitalopram 20 milliGRAM(s) Oral daily  finasteride 5 milliGRAM(s) Oral daily  folic acid 1 milliGRAM(s) Oral daily  heparin   Injectable 5000 Unit(s) SubCutaneous every 8 hours  iron sucrose Injectable 100 milliGRAM(s) IV Push every 24 hours  melatonin 3 milliGRAM(s) Oral at bedtime  metoprolol succinate ER 50 milliGRAM(s) Oral daily  multivitamin 1 Tablet(s) Oral daily  polyethylene glycol 3350 17 Gram(s) Oral daily  senna 2 Tablet(s) Oral at bedtime    MEDICATIONS  (PRN):  aluminum hydroxide/magnesium hydroxide/simethicone Suspension 30 milliLiter(s) Oral four times a day PRN Indigestion  benzocaine 15 mG/menthol 3.6 mG (Sugar-Free) Lozenge 1 Lozenge Oral every 3 hours PRN Sore Throat  magnesium hydroxide Suspension 30 milliLiter(s) Oral daily PRN Constipation  morphine  - Injectable 2 milliGRAM(s) IV Push every 6 hours PRN breakthrough pain  ondansetron Injectable 4 milliGRAM(s) IV Push every 6 hours PRN Nausea and/or Vomiting  traMADol 50 milliGRAM(s) Oral daily PRN 30 minutes prior to Physical Therapy  traMADol 50 milliGRAM(s) Oral every 6 hours PRN Severe Pain (7 - 10)  traMADol 25 milliGRAM(s) Oral every 4 hours PRN Moderate Pain (4 - 6)      Allergies    No Known Allergies    Social History:  Lives w/ wife.  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  Per wife, "easily confused."  Denies current or past tobacco use.  Infrequent etoh use.  CBD pills for pain. (23 Apr 2020 07:10)      REVIEW OF SYSTEMS: i am ok, Pain Left hip  CONSTITUTIONAL: No fever, No chills, No fatigue, No myalgia, No Body ache, No Weakness  EYES: No eye pain,  No visual disturbances, No discharge, NO Redness  ENMT:  No ear pain, No nose bleed, No vertigo; No sinus or throat pain, No Congestion  NECK: No pain, No stiffness  RESPIRATORY: No cough, wheezing, No  hemoptysis, No shortness of breath  CARDIOVASCULAR: No chest pain, palpitations  GASTROINTESTINAL: No abdominal or epigastric pain. No nausea, No vomiting; No diarrhea or constipation. [ x ] BM  GENITOURINARY: No dysuria, No frequency, No urgency, No hematuria, or incontinence  NEUROLOGICAL: No headaches, No dizziness, No numbness, No tingling, No tremors, No weakness  EXT: No Swelling, No Pain, No Edema  SKIN:  [ x ] No itching, burning, rashes, or lesions   MUSCULOSKELETAL: No joint pain or swelling; No muscle pain, No back pain, No extremity pain  PSYCHIATRIC: No depression, anxiety, mood swings or difficulty sleeping at night  PAIN SCALE: [  ] None  [ x ] Other-left hip pain  ROS Unable to obtain due to - [  ] Dementia  [  ] Lethargy  [  ] Sedated [  ] non verbal  REST OF REVIEW Of SYSTEM - [x  ] Normal       Vital Signs Last 24 Hrs  T(C): 36.4 (25 Apr 2020 04:45), Max: 36.8 (24 Apr 2020 18:10)  T(F): 97.6 (25 Apr 2020 04:45), Max: 98.3 (24 Apr 2020 18:10)  HR: 86 (25 Apr 2020 04:45) (71 - 86)  BP: 166/75 (25 Apr 2020 04:45) (134/69 - 166/75)  BP(mean): --  RR: 18 (25 Apr 2020 04:45) (18 - 18)  SpO2: 98% (25 Apr 2020 04:45) (98% - 99%)        I&O's Summary    24 Apr 2020 07:01  -  25 Apr 2020 07:00  --------------------------------------------------------  IN: 550 mL / OUT: 1000 mL / NET: -450 mL          PHYSICAL EXAM: pt is some what confused & fergetful  GENERAL:  [x  ] NAD , [  x] well appearing, [  ] Agitated, [  ] Drowsy,  [  ] Lethargy, [  ] confused   HEAD:  [x  ] Normal, [  ] Other  EYES:  [x  ] EOMI, [x  ] PERRLA, [  ] conjunctiva and sclera clear normal, [  ] Other,  [  ] Pallor,[  ] Discharge  ENMT:  [x ] Normal, [x  ] Dry  mucous membranes, [ x ] Good dentition, [x  ] No Thrush  NECK:  [x  ] Supple, [ x ] No JVD, [ x ] Normal thyroid, [  ] Lymphadenopathy [  ] Other  CHEST/LUNG:  [x  ] Clear to auscultation bilaterally, [ x ] Breath Sounds equal ,  [x  ] No rales, [x  ] No rhonchi  [x  ]  No wheezing  HEART:  [x  ] Regular rate and rhythm, [  ] tachycardia, [  ] Bradycardia,  [  ] irregular  [ x ] No murmurs, No rubs, No gallops, [  ] PPM in place (Mfr:  )  ABDOMEN:  [x  ] Soft, [ x ] Nontender, [ x ] Nondistended, [ x ] No mass, [ x ] Bowel sounds present, [x  ] obese  NERVOUS SYSTEM:  [ x ] Alert & Oriented X 1-, [ x ] Nonfocal  [x ] Confusion  [  ] Encephalopathic [  ] Sedated [  ] Unable to assess, [ x ] Other-? Dementia  EXTREMITIES: [ x ] 2+ Peripheral Pulses, No clubbing, No cyanosis, Left Hip dressing, mild swelling [  ] edema B/L lower EXT. [  ] PVD stasis skin changes B/L Lower EXT  LYMPH: No lymphadenopathy noted  SKIN:  [ x ] No rashes or lesions, [  ] Pressure Ulcers, [  ] ecchymosis, [  ] Skin Tears, [  ] Other      DIET: Regular    LABS:                          7.4    12.96 )-----------( 142      ( 25 Apr 2020 07:10 )             23.7       04-25    139  |  105  |  36<H>  ----------------------------<  99  4.3   |  27  |  1.50<H>    Ca    8.5      25 Apr 2020 07:10  Phos  3.9     04-24  Mg     2.2     04-24    TPro  6.4  /  Alb  3.2<L>  /  TBili  0.5  /  DBili  x   /  AST  71<H>  /  ALT  32  /  AlkPhos  61  04-24          Anemia Panel:  Ferritin, Serum: 57 ng/mL (04-24-20 @ 11:06)  Folate, Serum: 4.6 ng/mL (04-24-20 @ 11:06)  Vitamin B12, Serum: 706 pg/mL (04-24-20 @ 11:06)  Iron Total, Serum: 23 ug/dL (04-24-20 @ 11:05)  Iron - Total Binding Capacity.: 356 ug/dL (04-24-20 @ 11:05)      Thyroid Panel:      RADIOLOGY & ADDITIONAL TESTS  < from: Xray Hip w/ Pelvis Min 4 Views, Left (04.24.20 @ 13:13) >  EXAM:  XR HIP WITH PELV MIN 4V LT                            PROCEDURE DATE:  04/24/2020          INTERPRETATION:  Evaluate left hip hemiarthroplasty    AP pelvis and 2 views left hip.    There is a left hip hemiarthroplasty with intact hardware satisfactory alignment. Postoperative clips left inguinal region. There is a right THR with normal appearance. Osseous pelvic structures intact unremarkable. Degenerative change in the visualized lower lumbar spine.    Impression: Satisfactory postoperative appearance left hip hemiarthroplasty        JG GREENE M.D., ATTENDING RADIOLOGIST  This document has been electronically signed. Apr 24 2020 11:36AM    < end of copied text >        HEALTH ISSUES - PROBLEM Dx:  Anemia: Anemia  Leukocytosis: Leukocytosis  Need for prophylactic measure: Need for prophylactic measure  BPH (benign prostatic hyperplasia): BPH (benign prostatic hyperplasia)  High cholesterol: High cholesterol  CAD (coronary artery disease): CAD (coronary artery disease)  Hypertension: Hypertension  MAYRA (acute kidney injury): MAYRA (acute kidney injury)  Closed fracture of left hip, initial encounter: Closed fracture of left hip, initial encounter          Consultant(s) Notes Reviewed:  [x] YES     Care Discussed with [X] Consultants  [x  ] Patient  [ x ] Family-wife [  ]   [  ] Social Service  [ x ] RN, [  ] Physical Therapy  DVT PPX: [  ] Lovenox, [  ] S C Heparin, [  ] Coumadin, [  ] Xarelto, [  ] Eliquis, [  ] Pradaxa, [  ] IV Heparin drip, [ x] SCD [  ] Contraindication 2 to GI Bleed,[  ] Ambulation  Advanced directive: [x  ] None, [  ] DNR/DNI

## 2020-04-25 NOTE — PROGRESS NOTE ADULT - SUBJECTIVE AND OBJECTIVE BOX
Patient seen and examined at bedside. Reports no acute complaints at this time. Pain is well controlled. No acute events overnight.    PHYSICAL EXAM:  Vital Signs Last 24 Hrs  T(C): 36.4 (25 Apr 2020 04:45), Max: 36.8 (24 Apr 2020 18:10)  T(F): 97.6 (25 Apr 2020 04:45), Max: 98.3 (24 Apr 2020 18:10)  HR: 86 (25 Apr 2020 04:45) (71 - 86)  BP: 166/75 (25 Apr 2020 04:45) (134/69 - 166/75)  BP(mean): --  RR: 18 (25 Apr 2020 04:45) (18 - 18)  SpO2: 98% (25 Apr 2020 04:45) (98% - 99%)    Gen: NAD, AAOx3    Left Lower Extremity:  Dressing clean dry intact  +EHL/FHL/TA/GS  SILT L3-S1  +DP/PT Pulses  Compartments soft  No calf TTP B/L

## 2020-04-25 NOTE — PROGRESS NOTE ADULT - SUBJECTIVE AND OBJECTIVE BOX
Interval History:  continues post hip surgey  chart and labs reviewed  Chart reviewed and events noted;   Overnight events:    MEDICATIONS  (STANDING):  acetaminophen   Tablet .. 650 milliGRAM(s) Oral every 6 hours  acetaminophen   Tablet .. 650 milliGRAM(s) Oral once  ascorbic acid 500 milliGRAM(s) Oral two times a day  atorvastatin 10 milliGRAM(s) Oral at bedtime  bisacodyl Suppository 10 milliGRAM(s) Rectal daily  diphenhydrAMINE 25 milliGRAM(s) Oral once  escitalopram 20 milliGRAM(s) Oral daily  finasteride 5 milliGRAM(s) Oral daily  folic acid 1 milliGRAM(s) Oral daily  heparin   Injectable 5000 Unit(s) SubCutaneous every 8 hours  iron sucrose Injectable 100 milliGRAM(s) IV Push every 24 hours  melatonin 3 milliGRAM(s) Oral at bedtime  metoprolol succinate ER 50 milliGRAM(s) Oral daily  multivitamin 1 Tablet(s) Oral daily  polyethylene glycol 3350 17 Gram(s) Oral daily  senna 2 Tablet(s) Oral at bedtime    MEDICATIONS  (PRN):  aluminum hydroxide/magnesium hydroxide/simethicone Suspension 30 milliLiter(s) Oral four times a day PRN Indigestion  benzocaine 15 mG/menthol 3.6 mG (Sugar-Free) Lozenge 1 Lozenge Oral every 3 hours PRN Sore Throat  magnesium hydroxide Suspension 30 milliLiter(s) Oral daily PRN Constipation  morphine  - Injectable 2 milliGRAM(s) IV Push every 6 hours PRN breakthrough pain  ondansetron Injectable 4 milliGRAM(s) IV Push every 6 hours PRN Nausea and/or Vomiting  traMADol 50 milliGRAM(s) Oral daily PRN 30 minutes prior to Physical Therapy  traMADol 50 milliGRAM(s) Oral every 6 hours PRN Severe Pain (7 - 10)  traMADol 25 milliGRAM(s) Oral every 4 hours PRN Moderate Pain (4 - 6)      Vital Signs Last 24 Hrs  T(C): 36.4 (25 Apr 2020 04:45), Max: 36.8 (24 Apr 2020 18:10)  T(F): 97.6 (25 Apr 2020 04:45), Max: 98.3 (24 Apr 2020 18:10)  HR: 86 (25 Apr 2020 04:45) (71 - 86)  BP: 166/75 (25 Apr 2020 04:45) (134/69 - 166/75)  BP(mean): --  RR: 18 (25 Apr 2020 04:45) (18 - 18)  SpO2: 98% (25 Apr 2020 04:45) (98% - 99%)    PHYSICAL EXAM  General: adult in NAD  HEENT: clear oropharynx, anicteric sclera, pink conjunctivae  Neck: supple  CV: normal S1S2 with no murmur rubs or gallops  Lungs: clear to auscultation, no wheezes, no rhales  Abdomen: soft non-tender non-distended, no hepato/splenomegaly  Ext: no clubbing cyanosis or edema  Skin: no rashes and no petichiae  Neuro: alert but intermittently confused      LABS:  CBC Full  -  ( 25 Apr 2020 07:10 )  WBC Count : 12.96 K/uL  RBC Count : 2.60 M/uL  Hemoglobin : 7.4 g/dL  Hematocrit : 23.7 %  Platelet Count - Automated : 142 K/uL  Mean Cell Volume : 91.2 fl  Mean Cell Hemoglobin : 28.5 pg  Mean Cell Hemoglobin Concentration : 31.2 gm/dL  Auto Neutrophil # : x  Auto Lymphocyte # : x  Auto Monocyte # : x  Auto Eosinophil # : x  Auto Basophil # : x  Auto Neutrophil % : x  Auto Lymphocyte % : x  Auto Monocyte % : x  Auto Eosinophil % : x  Auto Basophil % : x    04-25    139  |  105  |  36<H>  ----------------------------<  99  4.3   |  27  |  1.50<H>    Ca    8.5      25 Apr 2020 07:10  Phos  3.9     04-24  Mg     2.2     04-24    TPro  6.4  /  Alb  3.2<L>  /  TBili  0.5  /  DBili  x   /  AST  71<H>  /  ALT  32  /  AlkPhos  61  04-24    PT/INR - ( 24 Apr 2020 09:26 )   PT: 13.5 sec;   INR: 1.20 ratio             fe studies  Ferritin, Serum: 57 ng/mL (04-24 @ 11:06)  Iron - Total Binding Capacity.: 356 ug/dL (04-24 @ 11:05)      WBC trend  12.96 K/uL (04-25-20 @ 07:10)  14.40 K/uL (04-24-20 @ 09:26)  13.29 K/uL (04-23-20 @ 18:02)  13.57 K/uL (04-23-20 @ 06:17)      Hgb trend  7.4 g/dL (04-25-20 @ 07:10)  7.7 g/dL (04-24-20 @ 09:26)  8.6 g/dL (04-23-20 @ 18:02)  9.4 g/dL (04-23-20 @ 06:17)      plt trend  142 K/uL (04-25-20 @ 07:10)  142 K/uL (04-24-20 @ 09:26)  150 K/uL (04-23-20 @ 18:02)  168 K/uL (04-23-20 @ 06:17)        RADIOLOGY & ADDITIONAL STUDIES:

## 2020-04-25 NOTE — PROGRESS NOTE ADULT - PROBLEM SELECTOR PLAN 10
- continue home med lexapro  - DVT ppx with heparin Q8H - continue home med Lexapro 20 mg daily  - DVT ppx with heparin Q8H

## 2020-04-25 NOTE — PROGRESS NOTE ADULT - ASSESSMENT
85 year old male with PMH of HTN, HLD, CAD (s/p open heart surgery 5yrs ago), chronic pain (s/p knee and rt hip replacement), BPH, and mild confusion (per wife) BIBEMS to [facility] ED s/p fall, admitted for left hip fracture.    L Hip Hemiarthroplasty POD 3  Analgesia  DVT ppx  WBAT  PT/OT  Encourage incentive spirometry  DC planning    MAYRA   F/U BMP       Anemia.  As per primary     CAD  Continue metoprolol 50mg daily   Continue 10mg atorvastatin daily      Monitor and replete electrolytes. Keep K>4.0 and Mg>2.0.    Patti Hagan DNP, ANP-C  Cardiology NP  [facility] [phone] 85 year old male with PMH of HTN, HLD, CAD (s/p open heart surgery 5yrs ago), chronic pain (s/p knee and rt hip replacement), BPH, and mild confusion (per wife) BIBEMS to [facility] ED s/p fall, admitted for left hip fracture.    L Hip Hemiarthroplasty POD 3  Analgesia  DVT ppx  WBAT  PT/OT  Encourage incentive spirometry  DC planning    MAYRA   F/U BMP       Anemia.  H/H 7.4/23.7 -today I unit PRBC ordered     CAD  Continue metoprolol 50mg daily   Continue 10mg atorvastatin daily      Monitor and replete electrolytes. Keep K>4.0 and Mg>2.0.    Patti Hagan DNP, ANP-C  Cardiology NP  [facility] [phone]

## 2020-04-25 NOTE — PROGRESS NOTE ADULT - ASSESSMENT
A/P: 85M s/p L Hip Hemiarthroplasty POD 3  Analgesia  DVT ppx  WBAT  PT/OT  Encourage incentive spirometry  DC planning  Will discuss with attending and advise if plan changes

## 2020-04-25 NOTE — PROGRESS NOTE ADULT - PROBLEM SELECTOR PLAN 6
- continue home med toprol XL 50mg daily with hold parameters - continue home med Toprol XL 50mg daily with hold parameters

## 2020-04-25 NOTE — PROGRESS NOTE ADULT - PROBLEM SELECTOR PLAN 2
H/H 9.4/30.1 on admission, baseline unknown  H/H today 7.7/24.7, likely multifactorial acute injury s/p surgery, underlying ckd, and chronic disease  - No signs or symptoms of acute bleed, most likely chronic  - heme consulted D/W Dr Israel -Anemia work up done -start on IV Venofer, CBC in AM  - 1x PRBC ordered, f/u repeat h/h

## 2020-04-25 NOTE — PROGRESS NOTE ADULT - SUBJECTIVE AND OBJECTIVE BOX
NP Progress Note     [facility] -- Estrada Bingham, Axel Diez, Guille Emanuel Savella, Goodger  Office # [phone]      Follow Up:  Left hip fracture       HPI:  86y/o M with PMH of HTN, HLD, Non obstructive CAD (s/p AVR ) 2015 , chronic pain (s/p knee and rt hip replacement), BPH, and mild confusion (per wife) BIBEMS to [facility] ED s/p fall. Per wife, whom patient lives with, patient was ambulating with his cane when he lost balance and fell. Patient was in normal state of health prior to this time. Patient is often is "off balance." Wife was unable to get him off ground so called fire department. Fire department said he was fine, and put him into bed. Wife noticed his left hip looked like it was "sticking out" and leg appeared "out-turned." Wife was going to take patient for x-rays today, however early this morning, patient fell out of bed so she decided to call EMS. Patient w/ difficulty bearing weight. Denies LOC, headache, neck or back pain. Denies CP, palpitations, SOB, cough, n/v/d. Has been eating and drinking well. Patient has been quarantined with his wife in their home for 5 weeks.    Fever or chills: yes [  ]   no [ X]  Fatigue, malaise or generalized weakness: yes [  ]   no [ X ]  Shortness of breath/dyspnea: yes [  ]   no [  x]  Cough: yes [  ]   no [x  ]  Anorexia/po intolerance: yes [  ]   no [ x ]  Chest pain or chest tightness: yes [  ]   no [ x ]  Myalgias: yes [  ]   no [ x ]  Headache: yes [  ]   no [ x ]  Sore throat: yes [  ]   no [ x ]  Rhinorrhea: yes [  ]   no [x  ]  Abd pain: yes [  ]   no [ x ]  Nausea: yes [  ]   no [x  ]  Vomiting: yes [  ]   no [  x]  Diarrhea: yes [  ]   no [ x ]  Loss of sense of smell/anosmia: yes [  ]   no [x  ]  Conjunctivitis: yes [  ]   no [x  ]  Recent travel: yes [  ]   no [ x ]  Any sick contacts: yes [  ]   no [x  ]  Close contact with someone confirmed positive with COVID-19 / SARS-CoV2 in the last 14 days: yes [  ]   no [x  ]    In ED,  Vitals: T 98.9F, HR 83, /82, RR 16, SpO2 4L NC  Labs significant for: WBC 13.57, N/L ratio 15.9, H/H 9.4/30.1, BUN 46, Cr 1.8  EKG: NSR at 78bpm  CT head non cont:  no acute intracranial hemorrhage or mass effect.  CT cervical spine non cont: no evidence of cervical spine fracture.  CXR: no acute pulmonary process  Left hip xray: subcapital left femoral neck fracture, marked external rotation of hip, age indeterminant fracture deformity involving the left ischiopubic ramus  COVID PCR obtained while in ED. Pt  C/O urinary hesitancy & difficulty on  urination. (23 Apr 2020 07:10)        Subjective/Observations: Pt. seen and examined and evaluated. Pt. resting comfortably in bed in NAD, with no respiratory distress, no chest pain, dyspnea, palpitations, PND, or orthopnea.    REVIEW OF SYSTEMS: All other review of systems is negative unless indicated above    PAST MEDICAL & SURGICAL HISTORY:  Anemia  S/P AVR (aortic valve replacement)  Mild CAD: Non Obstructive CAD  Chronic pain: s/p knee and hip replacements  High cholesterol  BPH (benign prostatic hyperplasia)  Hypertension  S/P AVR (aortic valve replacement)  History of total hip replacement, right  History of total right knee replacement (TKR): X2      MEDICATIONS  (STANDING):  acetaminophen   Tablet .. 650 milliGRAM(s) Oral every 6 hours  acetaminophen   Tablet .. 650 milliGRAM(s) Oral once  ascorbic acid 500 milliGRAM(s) Oral two times a day  atorvastatin 10 milliGRAM(s) Oral at bedtime  bisacodyl Suppository 10 milliGRAM(s) Rectal daily  diphenhydrAMINE 25 milliGRAM(s) Oral once  escitalopram 20 milliGRAM(s) Oral daily  finasteride 5 milliGRAM(s) Oral daily  folic acid 1 milliGRAM(s) Oral daily  heparin   Injectable 5000 Unit(s) SubCutaneous every 8 hours  iron sucrose Injectable 100 milliGRAM(s) IV Push every 24 hours  melatonin 3 milliGRAM(s) Oral at bedtime  metoprolol succinate ER 50 milliGRAM(s) Oral daily  multivitamin 1 Tablet(s) Oral daily  polyethylene glycol 3350 17 Gram(s) Oral daily  senna 2 Tablet(s) Oral at bedtime    MEDICATIONS  (PRN):  aluminum hydroxide/magnesium hydroxide/simethicone Suspension 30 milliLiter(s) Oral four times a day PRN Indigestion  benzocaine 15 mG/menthol 3.6 mG (Sugar-Free) Lozenge 1 Lozenge Oral every 3 hours PRN Sore Throat  magnesium hydroxide Suspension 30 milliLiter(s) Oral daily PRN Constipation  morphine  - Injectable 2 milliGRAM(s) IV Push every 6 hours PRN breakthrough pain  ondansetron Injectable 4 milliGRAM(s) IV Push every 6 hours PRN Nausea and/or Vomiting  traMADol 50 milliGRAM(s) Oral daily PRN 30 minutes prior to Physical Therapy  traMADol 50 milliGRAM(s) Oral every 6 hours PRN Severe Pain (7 - 10)  traMADol 25 milliGRAM(s) Oral every 4 hours PRN Moderate Pain (4 - 6)      Allergies    No Known Allergies    Intolerances          Vital Signs Last 24 Hrs  T(C): 36.4 (25 Apr 2020 04:45), Max: 36.8 (24 Apr 2020 18:10)  T(F): 97.6 (25 Apr 2020 04:45), Max: 98.3 (24 Apr 2020 18:10)  HR: 86 (25 Apr 2020 04:45) (71 - 86)  BP: 166/75 (25 Apr 2020 04:45) (134/69 - 166/75)  BP(mean): --  RR: 18 (25 Apr 2020 04:45) (18 - 18)  SpO2: 98% (25 Apr 2020 04:45) (98% - 99%)    I&O's Summary    24 Apr 2020 07:01  -  25 Apr 2020 07:00  --------------------------------------------------------  IN: 550 mL / OUT: 1000 mL / NET: -450 mL              LABS: All Labs Reviewed:                        7.4    12.96 )-----------( 142      ( 25 Apr 2020 07:10 )             23.7                          25 Apr 2020 07:10    139    |  105    |  36     ----------------------------<  99     4.3     |  27     |  1.50     Ca    8.5        25 Apr 2020 07:10    Interpretation of Telemetry: Pt. is not on telemetry       Physical Exam:  In accordance with current standards limit patient contact please refer to the recent physical exam. NP Progress Note     [facility] -- Estrada Bingham, Axel Diez, Guille Emanuel Savella, [place]ger  Office # [phone]      Follow Up:  Left hip fracture       HPI:  86y/o M with PMH of HTN, HLD, Non obstructive CAD (s/p AVR ) 2015 , chronic pain (s/p knee and rt hip replacement), BPH, and mild confusion (per wife) BIBEMS to [facility] s/p fall. Per wife, whom patient lives with, patient was ambulating with his cane when he lost balance and fell. Patient was in normal state of health prior to this time. Patient is often is "off balance." Wife was unable to get him off ground so called fire department. Fire department said he was fine, and put him into bed. Wife noticed his left hip looked like it was "sticking out" and leg appeared "out-turned." Wife was going to take patient for x-rays today, however early this morning, patient fell out of bed so she decided to call EMS. Patient w/ difficulty bearing weight. Denies LOC, headache, neck or back pain. Denies CP, palpitations, SOB, cough, n/v/d. Has been eating and drinking well. Patient has been quarantined with his wife in their home for 5 weeks.    Fever or chills: yes [  ]   no [ X]  Fatigue, malaise or generalized weakness: yes [  ]   no [ X ]  Shortness of breath/dyspnea: yes [  ]   no [  x]  Cough: yes [  ]   no [x  ]  Anorexia/po intolerance: yes [  ]   no [ x ]  Chest pain or chest tightness: yes [  ]   no [ x ]  Myalgias: yes [  ]   no [ x ]  Headache: yes [  ]   no [ x ]  Sore throat: yes [  ]   no [ x ]  Rhinorrhea: yes [  ]   no [x  ]  Abd pain: yes [  ]   no [ x ]  Nausea: yes [  ]   no [x  ]  Vomiting: yes [  ]   no [  x]  Diarrhea: yes [  ]   no [ x ]  Loss of sense of smell/anosmia: yes [  ]   no [x  ]  Conjunctivitis: yes [  ]   no [x  ]  Recent travel: yes [  ]   no [ x ]  Any sick contacts: yes [  ]   no [x  ]  Close contact with someone confirmed positive with COVID-19 / SARS-CoV2 in the last 14 days: yes [  ]   no [x  ]    In ED,  Vitals: T 98.9F, HR 83, /82, RR 16, SpO2 4L NC  Labs significant for: WBC 13.57, N/L ratio 15.9, H/H 9.4/30.1, BUN 46, Cr 1.8  EKG: NSR at 78bpm  CT head non cont:  no acute intracranial hemorrhage or mass effect.  CT cervical spine non cont: no evidence of cervical spine fracture.  CXR: no acute pulmonary process  Left hip xray: subcapital left femoral neck fracture, marked external rotation of hip, age indeterminant fracture deformity involving the left ischiopubic ramus  COVID PCR obtained while in ED. Pt  C/O urinary hesitancy & difficulty on  urination. (23 Apr 2020 07:10)        Subjective/Observations: Pt. seen and examined and evaluated. Pt. resting comfortably in bed in NAD, with no respiratory distress, no chest pain, dyspnea, palpitations, PND, or orthopnea. Blood transfusion in progress     REVIEW OF SYSTEMS: All other review of systems is negative unless indicated above    PAST MEDICAL & SURGICAL HISTORY:  Anemia  S/P AVR (aortic valve replacement)  Mild CAD: Non Obstructive CAD  Chronic pain: s/p knee and hip replacements  High cholesterol  BPH (benign prostatic hyperplasia)  Hypertension  S/P AVR (aortic valve replacement)  History of total hip replacement, right  History of total right knee replacement (TKR): X2      MEDICATIONS  (STANDING):  acetaminophen   Tablet .. 650 milliGRAM(s) Oral every 6 hours  acetaminophen   Tablet .. 650 milliGRAM(s) Oral once  ascorbic acid 500 milliGRAM(s) Oral two times a day  atorvastatin 10 milliGRAM(s) Oral at bedtime  bisacodyl Suppository 10 milliGRAM(s) Rectal daily  diphenhydrAMINE 25 milliGRAM(s) Oral once  escitalopram 20 milliGRAM(s) Oral daily  finasteride 5 milliGRAM(s) Oral daily  folic acid 1 milliGRAM(s) Oral daily  heparin   Injectable 5000 Unit(s) SubCutaneous every 8 hours  iron sucrose Injectable 100 milliGRAM(s) IV Push every 24 hours  melatonin 3 milliGRAM(s) Oral at bedtime  metoprolol succinate ER 50 milliGRAM(s) Oral daily  multivitamin 1 Tablet(s) Oral daily  polyethylene glycol 3350 17 Gram(s) Oral daily  senna 2 Tablet(s) Oral at bedtime    MEDICATIONS  (PRN):  aluminum hydroxide/magnesium hydroxide/simethicone Suspension 30 milliLiter(s) Oral four times a day PRN Indigestion  benzocaine 15 mG/menthol 3.6 mG (Sugar-Free) Lozenge 1 Lozenge Oral every 3 hours PRN Sore Throat  magnesium hydroxide Suspension 30 milliLiter(s) Oral daily PRN Constipation  morphine  - Injectable 2 milliGRAM(s) IV Push every 6 hours PRN breakthrough pain  ondansetron Injectable 4 milliGRAM(s) IV Push every 6 hours PRN Nausea and/or Vomiting  traMADol 50 milliGRAM(s) Oral daily PRN 30 minutes prior to Physical Therapy  traMADol 50 milliGRAM(s) Oral every 6 hours PRN Severe Pain (7 - 10)  traMADol 25 milliGRAM(s) Oral every 4 hours PRN Moderate Pain (4 - 6)      Allergies    No Known Allergies    Intolerances          Vital Signs Last 24 Hrs  T(C): 36.4 (25 Apr 2020 04:45), Max: 36.8 (24 Apr 2020 18:10)  T(F): 97.6 (25 Apr 2020 04:45), Max: 98.3 (24 Apr 2020 18:10)  HR: 86 (25 Apr 2020 04:45) (71 - 86)  BP: 166/75 (25 Apr 2020 04:45) (134/69 - 166/75)  BP(mean): --  RR: 18 (25 Apr 2020 04:45) (18 - 18)  SpO2: 98% (25 Apr 2020 04:45) (98% - 99%)    I&O's Summary    24 Apr 2020 07:01  -  25 Apr 2020 07:00  --------------------------------------------------------  IN: 550 mL / OUT: 1000 mL / NET: -450 mL              LABS: All Labs Reviewed:                        7.4    12.96 )-----------( 142      ( 25 Apr 2020 07:10 )             23.7                          25 Apr 2020 07:10    139    |  105    |  36     ----------------------------<  99     4.3     |  27     |  1.50     Ca    8.5        25 Apr 2020 07:10    Interpretation of Telemetry: Pt. is not on telemetry       Physical Exam:  In accordance with current standards limit patient contact please refer to the recent physical exam. NP Progress Note     [facility] -- Estrada Bingham Grossman, [place], Guille Emanuel Savella, Goodger  Office # [phone]      Follow Up:  Left hip fracture       Subjective/Observations: Pt. seen and examined and evaluated. Pt. resting comfortably in bed in NAD, with no respiratory distress, no chest pain, dyspnea, palpitations, PND, or orthopnea. Blood transfusion in progress     REVIEW OF SYSTEMS: All other review of systems is negative unless indicated above    PAST MEDICAL & SURGICAL HISTORY:  Anemia  S/P AVR (aortic valve replacement)  Mild CAD: Non Obstructive CAD  Chronic pain: s/p knee and hip replacements  High cholesterol  BPH (benign prostatic hyperplasia)  Hypertension  S/P AVR (aortic valve replacement)  History of total hip replacement, right  History of total right knee replacement (TKR): X2      MEDICATIONS  (STANDING):  acetaminophen   Tablet .. 650 milliGRAM(s) Oral every 6 hours  acetaminophen   Tablet .. 650 milliGRAM(s) Oral once  ascorbic acid 500 milliGRAM(s) Oral two times a day  atorvastatin 10 milliGRAM(s) Oral at bedtime  bisacodyl Suppository 10 milliGRAM(s) Rectal daily  diphenhydrAMINE 25 milliGRAM(s) Oral once  escitalopram 20 milliGRAM(s) Oral daily  finasteride 5 milliGRAM(s) Oral daily  folic acid 1 milliGRAM(s) Oral daily  heparin   Injectable 5000 Unit(s) SubCutaneous every 8 hours  iron sucrose Injectable 100 milliGRAM(s) IV Push every 24 hours  melatonin 3 milliGRAM(s) Oral at bedtime  metoprolol succinate ER 50 milliGRAM(s) Oral daily  multivitamin 1 Tablet(s) Oral daily  polyethylene glycol 3350 17 Gram(s) Oral daily  senna 2 Tablet(s) Oral at bedtime    MEDICATIONS  (PRN):  aluminum hydroxide/magnesium hydroxide/simethicone Suspension 30 milliLiter(s) Oral four times a day PRN Indigestion  benzocaine 15 mG/menthol 3.6 mG (Sugar-Free) Lozenge 1 Lozenge Oral every 3 hours PRN Sore Throat  magnesium hydroxide Suspension 30 milliLiter(s) Oral daily PRN Constipation  morphine  - Injectable 2 milliGRAM(s) IV Push every 6 hours PRN breakthrough pain  ondansetron Injectable 4 milliGRAM(s) IV Push every 6 hours PRN Nausea and/or Vomiting  traMADol 50 milliGRAM(s) Oral daily PRN 30 minutes prior to Physical Therapy  traMADol 50 milliGRAM(s) Oral every 6 hours PRN Severe Pain (7 - 10)  traMADol 25 milliGRAM(s) Oral every 4 hours PRN Moderate Pain (4 - 6)      Allergies    No Known Allergies    Intolerances          Vital Signs Last 24 Hrs  T(C): 36.4 (25 Apr 2020 04:45), Max: 36.8 (24 Apr 2020 18:10)  T(F): 97.6 (25 Apr 2020 04:45), Max: 98.3 (24 Apr 2020 18:10)  HR: 86 (25 Apr 2020 04:45) (71 - 86)  BP: 166/75 (25 Apr 2020 04:45) (134/69 - 166/75)  BP(mean): --  RR: 18 (25 Apr 2020 04:45) (18 - 18)  SpO2: 98% (25 Apr 2020 04:45) (98% - 99%)    I&O's Summary    24 Apr 2020 07:01  -  25 Apr 2020 07:00  --------------------------------------------------------  IN: 550 mL / OUT: 1000 mL / NET: -450 mL              LABS: All Labs Reviewed:                        7.4    12.96 )-----------( 142      ( 25 Apr 2020 07:10 )             23.7                          25 Apr 2020 07:10    139    |  105    |  36     ----------------------------<  99     4.3     |  27     |  1.50     Ca    8.5        25 Apr 2020 07:10    Interpretation of Telemetry: Pt. is not on telemetry       Physical Exam:  In accordance with current standards limit patient contact please refer to the recent physical exam.  + systolic murmur

## 2020-04-26 LAB
ANION GAP SERPL CALC-SCNC: 7 MMOL/L — SIGNIFICANT CHANGE UP (ref 5–17)
BASOPHILS # BLD AUTO: 0.06 K/UL — SIGNIFICANT CHANGE UP (ref 0–0.2)
BASOPHILS NFR BLD AUTO: 0.5 % — SIGNIFICANT CHANGE UP (ref 0–2)
BUN SERPL-MCNC: 27 MG/DL — HIGH (ref 7–23)
CALCIUM SERPL-MCNC: 8.7 MG/DL — SIGNIFICANT CHANGE UP (ref 8.5–10.1)
CHLORIDE SERPL-SCNC: 104 MMOL/L — SIGNIFICANT CHANGE UP (ref 96–108)
CO2 SERPL-SCNC: 28 MMOL/L — SIGNIFICANT CHANGE UP (ref 22–31)
CREAT SERPL-MCNC: 1.3 MG/DL — SIGNIFICANT CHANGE UP (ref 0.5–1.3)
EOSINOPHIL # BLD AUTO: 0.27 K/UL — SIGNIFICANT CHANGE UP (ref 0–0.5)
EOSINOPHIL NFR BLD AUTO: 2.5 % — SIGNIFICANT CHANGE UP (ref 0–6)
GLUCOSE SERPL-MCNC: 112 MG/DL — HIGH (ref 70–99)
HCT VFR BLD CALC: 28.7 % — LOW (ref 39–50)
HGB BLD-MCNC: 8.7 G/DL — LOW (ref 13–17)
IMM GRANULOCYTES NFR BLD AUTO: 0.5 % — SIGNIFICANT CHANGE UP (ref 0–1.5)
LYMPHOCYTES # BLD AUTO: 1.1 K/UL — SIGNIFICANT CHANGE UP (ref 1–3.3)
LYMPHOCYTES # BLD AUTO: 10.1 % — LOW (ref 13–44)
MCHC RBC-ENTMCNC: 27.4 PG — SIGNIFICANT CHANGE UP (ref 27–34)
MCHC RBC-ENTMCNC: 30.3 GM/DL — LOW (ref 32–36)
MCV RBC AUTO: 90.3 FL — SIGNIFICANT CHANGE UP (ref 80–100)
MONOCYTES # BLD AUTO: 1.36 K/UL — HIGH (ref 0–0.9)
MONOCYTES NFR BLD AUTO: 12.5 % — SIGNIFICANT CHANGE UP (ref 2–14)
NEUTROPHILS # BLD AUTO: 8.08 K/UL — HIGH (ref 1.8–7.4)
NEUTROPHILS NFR BLD AUTO: 73.9 % — SIGNIFICANT CHANGE UP (ref 43–77)
NRBC # BLD: 0 /100 WBCS — SIGNIFICANT CHANGE UP (ref 0–0)
PLATELET # BLD AUTO: 162 K/UL — SIGNIFICANT CHANGE UP (ref 150–400)
POTASSIUM SERPL-MCNC: 3.8 MMOL/L — SIGNIFICANT CHANGE UP (ref 3.5–5.3)
POTASSIUM SERPL-SCNC: 3.8 MMOL/L — SIGNIFICANT CHANGE UP (ref 3.5–5.3)
RBC # BLD: 3.18 M/UL — LOW (ref 4.2–5.8)
RBC # FLD: 14.3 % — SIGNIFICANT CHANGE UP (ref 10.3–14.5)
SODIUM SERPL-SCNC: 139 MMOL/L — SIGNIFICANT CHANGE UP (ref 135–145)
WBC # BLD: 10.92 K/UL — HIGH (ref 3.8–10.5)
WBC # FLD AUTO: 10.92 K/UL — HIGH (ref 3.8–10.5)

## 2020-04-26 PROCEDURE — 99232 SBSQ HOSP IP/OBS MODERATE 35: CPT

## 2020-04-26 RX ORDER — RIVAROXABAN 15 MG-20MG
10 KIT ORAL DAILY
Refills: 0 | Status: DISCONTINUED | OUTPATIENT
Start: 2020-04-26 | End: 2020-04-28

## 2020-04-26 RX ORDER — ACETAMINOPHEN 500 MG
2 TABLET ORAL
Qty: 0 | Refills: 0 | DISCHARGE
Start: 2020-04-26

## 2020-04-26 RX ADMIN — SENNA PLUS 2 TABLET(S): 8.6 TABLET ORAL at 22:22

## 2020-04-26 RX ADMIN — TRAMADOL HYDROCHLORIDE 50 MILLIGRAM(S): 50 TABLET ORAL at 11:20

## 2020-04-26 RX ADMIN — Medication 30 MILLILITER(S): at 08:52

## 2020-04-26 RX ADMIN — ESCITALOPRAM OXALATE 20 MILLIGRAM(S): 10 TABLET, FILM COATED ORAL at 11:08

## 2020-04-26 RX ADMIN — FINASTERIDE 5 MILLIGRAM(S): 5 TABLET, FILM COATED ORAL at 11:08

## 2020-04-26 RX ADMIN — TRAMADOL HYDROCHLORIDE 50 MILLIGRAM(S): 50 TABLET ORAL at 22:28

## 2020-04-26 RX ADMIN — Medication 500 MILLIGRAM(S): at 05:57

## 2020-04-26 RX ADMIN — ATORVASTATIN CALCIUM 10 MILLIGRAM(S): 80 TABLET, FILM COATED ORAL at 22:22

## 2020-04-26 RX ADMIN — TRAMADOL HYDROCHLORIDE 50 MILLIGRAM(S): 50 TABLET ORAL at 03:17

## 2020-04-26 RX ADMIN — Medication 650 MILLIGRAM(S): at 17:55

## 2020-04-26 RX ADMIN — Medication 50 MILLIGRAM(S): at 05:57

## 2020-04-26 RX ADMIN — IRON SUCROSE 100 MILLIGRAM(S): 20 INJECTION, SOLUTION INTRAVENOUS at 22:22

## 2020-04-26 RX ADMIN — Medication 1 TABLET(S): at 11:08

## 2020-04-26 RX ADMIN — Medication 30 MILLILITER(S): at 00:19

## 2020-04-26 RX ADMIN — Medication 650 MILLIGRAM(S): at 05:57

## 2020-04-26 RX ADMIN — Medication 1 MILLIGRAM(S): at 11:08

## 2020-04-26 RX ADMIN — Medication 1 ENEMA: at 11:58

## 2020-04-26 RX ADMIN — Medication 650 MILLIGRAM(S): at 11:07

## 2020-04-26 RX ADMIN — HEPARIN SODIUM 5000 UNIT(S): 5000 INJECTION INTRAVENOUS; SUBCUTANEOUS at 05:57

## 2020-04-26 RX ADMIN — Medication 500 MILLIGRAM(S): at 17:57

## 2020-04-26 RX ADMIN — Medication 3 MILLIGRAM(S): at 22:22

## 2020-04-26 NOTE — PROGRESS NOTE ADULT - PROBLEM SELECTOR PLAN 2
-H/H 9.4/30.1 on admission, baseline unknown  -H/H decreased to 7.4/23.7 on 4/25 requiring 1u PRBC and started on venofer  -anemia likely multifactorial acute injury s/p surgery, underlying ckd, and chronic disease  - No signs or symptoms of acute bleed, most likely chronic  - H/H currently stable

## 2020-04-26 NOTE — PROGRESS NOTE ADULT - PROBLEM SELECTOR PLAN 3
leukocytosis likely reactive in setting of acute fracture  - Doubt infectious etiology as patient w/o viral symptoms or fever, hemodynamically stable at this time  - COVID negative

## 2020-04-26 NOTE — PROGRESS NOTE ADULT - SUBJECTIVE AND OBJECTIVE BOX
Patient is a 85y old  Male who presents with a chief complaint of Left hip fracture (subcapital left femoral neck fracture)  Patients cell #: [phone] (26 Apr 2020 10:10)        84y/o M with PMH of HTN, HLD, CAD (s/p open heart surgery 5yrs ago), chronic pain (s/p knee and rt hip replacement), BPH, and mild confusion (per wife) BIBEMS to [facility] ED s/p fall. Per wife, whom patient lives with, patient was ambulating with his cane when he lost balance and fell. Patient was in normal state of health prior to this time. Patient is often is "off balance." Wife was unable to get him off ground so called fire department. Fire department said he was fine, and put him into bed. Wife noticed his left hip looked like it was "sticking out" and leg appeared "out-turned." Wife was going to take patient for x-rays today, however early this morning, patient fell out of bed so she decided to call EMS. Patient w/ difficulty bearing weight. Denies LOC, headache, neck or back pain. Denies CP, palpitations, SOB, cough, n/v/d. Has been eating and drinking well. Patient has been quarantined with his wife in their home for 5 weeks.    Fever or chills: yes [  ]   no [ X]  Fatigue, malaise or generalized weakness: yes [  ]   no [ X ]  Shortness of breath/dyspnea: yes [  ]   no [  x]  Cough: yes [  ]   no [x  ]  Anorexia/po intolerance: yes [  ]   no [ x ]  Chest pain or chest tightness: yes [  ]   no [ x ]  Myalgias: yes [  ]   no [ x ]  Headache: yes [  ]   no [ x ]  Sore throat: yes [  ]   no [ x ]  Rhinorrhea: yes [  ]   no [x  ]  Abd pain: yes [  ]   no [ x ]  Nausea: yes [  ]   no [x  ]  Vomiting: yes [  ]   no [  x]  Diarrhea: yes [  ]   no [ x ]  Loss of sense of smell/anosmia: yes [  ]   no [x  ]  Conjunctivitis: yes [  ]   no [x  ]  Recent travel: yes [  ]   no [ x ]  Any sick contacts: yes [  ]   no [x  ]  Close contact with someone confirmed positive with COVID-19 / SARS-CoV2 in the last 14 days: yes [  ]   no [x  ]    In ED,  Vitals: T 98.9F, HR 83, /82, RR 16, SpO2 4L NC  Labs significant for: WBC 13.57, N/L ratio 15.9, H/H 9.4/30.1, BUN 46, Cr 1.8  EKG: NSR at 78bpm  CT head non cont:  no acute intracranial hemorrhage or mass effect.  CT cervical spine non cont: no evidence of cervical spine fracture.  CXR: no acute pulmonary process  Left hip xray: subcapital left femoral neck fracture, marked external rotation of hip, age indeterminant fracture deformity involving the left ischiopubic ramus  COVID PCR obtained while in ED.    INTERVAL HPI:  4/23/20: Pt seen and examined. Planned on OR today. cardio clearance obtained. Patient kept NPO pending procedure. COVID 19 -NEG, Pt unable to urinate. chronic anemia  4/24/20: Pt seen and examined. S/p Hemiarthroplasty POD #1. Seen by Heme/onc for anemia, Low H/H  likely multifactorial. start on IV Venofer x 3 doses. Mild leukocytosis.  4/25/20: Pt seen and examined. S/p Hemiarthroplasty POD #2. H/H low today requiring 1x PRBC transfusion and started on venofer x3 days by heme.   4/26/20: Pt seen and examined. S/p Hemiarthroplasty POD3. S/p 1u PRBC transfusion yesterday, on venofer. H/H stable today.    OVERNIGHT EVENTS: none    Home Medications:  Amitiza 24 mcg oral capsule: 1 cap(s) orally 2 times a day (23 Apr 2020 11:40)  atorvastatin 10 mg oral tablet: 1 tab(s) orally once a day (23 Apr 2020 11:40)  escitalopram 20 mg oral tablet: 1 tab(s) orally once a day (23 Apr 2020 11:40)  finasteride 5 mg oral tablet: 1 tab(s) orally once a day (23 Apr 2020 11:40)  Metoprolol Succinate ER 50 mg oral tablet, extended release: 1 tab(s) orally once a day (23 Apr 2020 11:40)  traMADol 150 mg/24 hours oral capsule, extended release: 1 cap(s) orally once a day (23 Apr 2020 11:40)      MEDICATIONS  (STANDING):  acetaminophen   Tablet .. 650 milliGRAM(s) Oral every 6 hours  ascorbic acid 500 milliGRAM(s) Oral two times a day  atorvastatin 10 milliGRAM(s) Oral at bedtime  escitalopram 20 milliGRAM(s) Oral daily  finasteride 5 milliGRAM(s) Oral daily  folic acid 1 milliGRAM(s) Oral daily  heparin   Injectable 5000 Unit(s) SubCutaneous every 8 hours  iron sucrose Injectable 100 milliGRAM(s) IV Push every 24 hours  melatonin 3 milliGRAM(s) Oral at bedtime  metoprolol succinate ER 50 milliGRAM(s) Oral daily  multivitamin 1 Tablet(s) Oral daily  polyethylene glycol 3350 17 Gram(s) Oral daily  saline laxative (FLEET) Rectal Enema 1 Enema Rectal once  senna 2 Tablet(s) Oral at bedtime    MEDICATIONS  (PRN):  aluminum hydroxide/magnesium hydroxide/simethicone Suspension 30 milliLiter(s) Oral four times a day PRN Indigestion  benzocaine 15 mG/menthol 3.6 mG (Sugar-Free) Lozenge 1 Lozenge Oral every 3 hours PRN Sore Throat  magnesium hydroxide Suspension 30 milliLiter(s) Oral daily PRN Constipation  morphine  - Injectable 2 milliGRAM(s) IV Push every 6 hours PRN breakthrough pain  ondansetron Injectable 4 milliGRAM(s) IV Push every 6 hours PRN Nausea and/or Vomiting  traMADol 50 milliGRAM(s) Oral daily PRN 30 minutes prior to Physical Therapy  traMADol 50 milliGRAM(s) Oral every 6 hours PRN Severe Pain (7 - 10)  traMADol 25 milliGRAM(s) Oral every 4 hours PRN Moderate Pain (4 - 6)      Allergies    No Known Allergies    Social History:  Lives w/ wife.  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  Per wife, "easily confused."  Denies current or past tobacco use.  Infrequent etoh use.  CBD pills for pain.       REVIEW OF SYSTEMS:   CONSTITUTIONAL: No fever, No chills, No fatigue, No myalgia, No Body ache, No Weakness  EYES: No eye pain,  No visual disturbances, No discharge, NO Redness  ENMT:  No ear pain, No nose bleed, No vertigo; No sinus or throat pain, No Congestion  NECK: No pain, No stiffness  RESPIRATORY: No cough, wheezing, No  hemoptysis, No shortness of breath  CARDIOVASCULAR: No chest pain, palpitations  GASTROINTESTINAL: No abdominal or epigastric pain. No nausea, No vomiting; No diarrhea or constipation. [ x ] BM  GENITOURINARY: No dysuria, No frequency, No urgency, No hematuria, or incontinence  NEUROLOGICAL: No headaches, No dizziness, No numbness, No tingling, No tremors, No weakness  EXT: No Swelling, No Pain, No Edema  SKIN:  [ x ] No itching, burning, rashes, or lesions   MUSCULOSKELETAL: No joint pain or swelling; No muscle pain, No back pain, No extremity pain  PSYCHIATRIC: No depression, anxiety, mood swings or difficulty sleeping at night  PAIN SCALE: [  ] None  [ x ] Other-left hip pain  ROS Unable to obtain due to - [  ] Dementia  [  ] Lethargy  [  ] Sedated [  ] non verbal  REST OF REVIEW Of SYSTEM - [x  ] Normal       Vital Signs Last 24 Hrs  T(C): 36.6 (26 Apr 2020 05:55), Max: 36.9 (25 Apr 2020 22:15)  T(F): 97.9 (26 Apr 2020 05:55), Max: 98.5 (25 Apr 2020 22:15)  HR: 85 (26 Apr 2020 05:55) (76 - 86)  BP: 163/81 (26 Apr 2020 05:55) (127/77 - 164/77)  BP(mean): --  RR: 17 (26 Apr 2020 05:55) (17 - 18)  SpO2: 95% (26 Apr 2020 05:55) (92% - 99%)      I&O's Summary            PHYSICAL EXAM:   GENERAL:  [x  ] NAD , [  x] well appearing, [  ] Agitated, [  ] Drowsy,  [  ] Lethargy, [  ] confused   HEAD:  [x  ] Normal, [  ] Other  EYES:  [x  ] EOMI, [x  ] PERRLA, [  ] conjunctiva and sclera clear normal, [  ] Other,  [  ] Pallor,[  ] Discharge  ENMT:  [x ] Normal, [x  ] Dry  mucous membranes, [ x ] Good dentition, [x  ] No Thrush  NECK:  [x  ] Supple, [ x ] No JVD, [ x ] Normal thyroid, [  ] Lymphadenopathy [  ] Other  CHEST/LUNG:  [x  ] Clear to auscultation bilaterally, [ x ] Breath Sounds equal ,  [x  ] No rales, [x  ] No rhonchi  [x  ]  No wheezing  HEART:  [x  ] Regular rate and rhythm, [  ] tachycardia, [  ] Bradycardia,  [  ] irregular  [ x ] No murmurs, No rubs, No gallops, [  ] PPM in place (Mfr:  )  ABDOMEN:  [x  ] Soft, [ x ] Nontender, [ x ] Nondistended, [ x ] No mass, [ x ] Bowel sounds present, [x  ] obese  NERVOUS SYSTEM:  [ x ] Alert & Oriented X 1-, [ x ] Nonfocal  [x ] Confusion  [  ] Encephalopathic [  ] Sedated [  ] Unable to assess, [ x ] Other-? Dementia  EXTREMITIES: [ x ] 2+ Peripheral Pulses, No clubbing, No cyanosis, Left Hip dressing, mild swelling [  ] edema B/L lower EXT. [  ] PVD stasis skin changes B/L Lower EXT  LYMPH: No lymphadenopathy noted  SKIN:  [ x ] No rashes or lesions, [  ] Pressure Ulcers, [  ] ecchymosis, [  ] Skin Tears, [  ] Other      DIET: Regular    LABS:                          8.7    10.92 )-----------( 162      ( 26 Apr 2020 09:15 )             28.7       04-26    139  |  104  |  27<H>  ----------------------------<  112<H>  3.8   |  28  |  1.30    Ca    8.7      26 Apr 2020 09:15          Anemia Panel:  Ferritin, Serum: 57 ng/mL (04-24-20 @ 11:06)  Folate, Serum: 4.6 ng/mL (04-24-20 @ 11:06)  Vitamin B12, Serum: 706 pg/mL (04-24-20 @ 11:06)  Iron Total, Serum: 23 ug/dL (04-24-20 @ 11:05)  Iron - Total Binding Capacity.: 356 ug/dL (04-24-20 @ 11:05)      Thyroid Panel:      RADIOLOGY & ADDITIONAL TESTS  < from: Xray Hip w/ Pelvis Min 4 Views, Left (04.24.20 @ 13:13) >  EXAM:  XR HIP WITH PELV MIN 4V LT                            PROCEDURE DATE:  04/24/2020          INTERPRETATION:  Evaluate left hip hemiarthroplasty    AP pelvis and 2 views left hip.    There is a left hip hemiarthroplasty with intact hardware satisfactory alignment. Postoperative clips left inguinal region. There is a right THR with normal appearance. Osseous pelvic structures intact unremarkable. Degenerative change in the visualized lower lumbar spine.    Impression: Satisfactory postoperative appearance left hip hemiarthroplasty        JG GREENE M.D., ATTENDING RADIOLOGIST  This document has been electronically signed. Apr 24 2020 11:36AM    < end of copied text >        HEALTH ISSUES - PROBLEM Dx:  Anemia: Anemia  Leukocytosis: Leukocytosis  Need for prophylactic measure: Need for prophylactic measure  BPH (benign prostatic hyperplasia): BPH (benign prostatic hyperplasia)  High cholesterol: High cholesterol  CAD (coronary artery disease): CAD (coronary artery disease)  Hypertension: Hypertension  MAYRA (acute kidney injury): MAYRA (acute kidney injury)  Closed fracture of left hip, initial encounter: Closed fracture of left hip, initial encounter          Consultant(s) Notes Reviewed:  [x] YES     Care Discussed with [X] Consultants  [x  ] Patient  [ x ] Family-wife [  ]   [  ] Social Service  [ x ] RN, [  ] Physical Therapy  DVT PPX: [  ] Lovenox, [  ] S C Heparin, [  ] Coumadin, [  ] Xarelto, [  ] Eliquis, [  ] Pradaxa, [  ] IV Heparin drip, [ x] SCD [  ] Contraindication 2 to GI Bleed,[  ] Ambulation  Advanced directive: [x  ] None, [  ] DNR/DNI Patient is a 85y old  Male who presents with a chief complaint of Left hip fracture (subcapital left femoral neck fracture)  Patients cell #: [phone] (26 Apr 2020 10:10)        86y/o M with PMH of HTN, HLD, CAD (s/p open heart surgery 5yrs ago), chronic pain (s/p knee and rt hip replacement), BPH, and mild confusion (per wife) BIBEMS to [facility] ED s/p fall. Per wife, whom patient lives with, patient was ambulating with his cane when he lost balance and fell. Patient was in normal state of health prior to this time. Patient is often is "off balance." Wife was unable to get him off ground so called fire department. Fire department said he was fine, and put him into bed. Wife noticed his left hip looked like it was "sticking out" and leg appeared "out-turned." Wife was going to take patient for x-rays today, however early this morning, patient fell out of bed so she decided to call EMS. Patient w/ difficulty bearing weight. Denies LOC, headache, neck or back pain. Denies CP, palpitations, SOB, cough, n/v/d. Has been eating and drinking well. Patient has been quarantined with his wife in their home for 5 weeks.    Fever or chills: yes [  ]   no [ X]  Fatigue, malaise or generalized weakness: yes [  ]   no [ X ]  Shortness of breath/dyspnea: yes [  ]   no [  x]  Cough: yes [  ]   no [x  ]  Anorexia/po intolerance: yes [  ]   no [ x ]  Chest pain or chest tightness: yes [  ]   no [ x ]  Myalgias: yes [  ]   no [ x ]  Headache: yes [  ]   no [ x ]  Sore throat: yes [  ]   no [ x ]  Rhinorrhea: yes [  ]   no [x  ]  Abd pain: yes [  ]   no [ x ]  Nausea: yes [  ]   no [x  ]  Vomiting: yes [  ]   no [  x]  Diarrhea: yes [  ]   no [ x ]  Loss of sense of smell/anosmia: yes [  ]   no [x  ]  Conjunctivitis: yes [  ]   no [x  ]  Recent travel: yes [  ]   no [ x ]  Any sick contacts: yes [  ]   no [x  ]  Close contact with someone confirmed positive with COVID-19 / SARS-CoV2 in the last 14 days: yes [  ]   no [x  ]    In ED,  Vitals: T 98.9F, HR 83, /82, RR 16, SpO2 4L NC  Labs significant for: WBC 13.57, N/L ratio 15.9, H/H 9.4/30.1, BUN 46, Cr 1.8  EKG: NSR at 78bpm  CT head non cont:  no acute intracranial hemorrhage or mass effect.  CT cervical spine non cont: no evidence of cervical spine fracture.  CXR: no acute pulmonary process  Left hip xray: subcapital left femoral neck fracture, marked external rotation of hip, age indeterminant fracture deformity involving the left ischiopubic ramus  COVID PCR obtained while in ED.    INTERVAL HPI:  4/23/20: Pt seen and examined. Planned on OR today. cardio clearance obtained. Patient kept NPO pending procedure. COVID 19 -NEG, Pt unable to urinate. chronic anemia  4/24/20: Pt seen and examined. S/p Hemiarthroplasty POD #1. Seen by Heme/onc for anemia, Low H/H  likely multifactorial. start on IV Venofer x 3 doses. Mild leukocytosis.  4/25/20: Pt seen and examined. S/p Hemiarthroplasty POD #2. H/H low today requiring 1x PRBC transfusion and started on venofer x3 days by heme.   4/26/20: Pt seen and examined. S/p Hemiarthroplasty POD3. S/p 1u PRBC transfusion yesterday, on venofer. H/H stable today. Switched to PO xarelto for dvt ppx postop. d/c planning    OVERNIGHT EVENTS: none    Home Medications:  Amitiza 24 mcg oral capsule: 1 cap(s) orally 2 times a day (23 Apr 2020 11:40)  atorvastatin 10 mg oral tablet: 1 tab(s) orally once a day (23 Apr 2020 11:40)  escitalopram 20 mg oral tablet: 1 tab(s) orally once a day (23 Apr 2020 11:40)  finasteride 5 mg oral tablet: 1 tab(s) orally once a day (23 Apr 2020 11:40)  Metoprolol Succinate ER 50 mg oral tablet, extended release: 1 tab(s) orally once a day (23 Apr 2020 11:40)  traMADol 150 mg/24 hours oral capsule, extended release: 1 cap(s) orally once a day (23 Apr 2020 11:40)      MEDICATIONS  (STANDING):  acetaminophen   Tablet .. 650 milliGRAM(s) Oral every 6 hours  ascorbic acid 500 milliGRAM(s) Oral two times a day  atorvastatin 10 milliGRAM(s) Oral at bedtime  escitalopram 20 milliGRAM(s) Oral daily  finasteride 5 milliGRAM(s) Oral daily  folic acid 1 milliGRAM(s) Oral daily  heparin   Injectable 5000 Unit(s) SubCutaneous every 8 hours  iron sucrose Injectable 100 milliGRAM(s) IV Push every 24 hours  melatonin 3 milliGRAM(s) Oral at bedtime  metoprolol succinate ER 50 milliGRAM(s) Oral daily  multivitamin 1 Tablet(s) Oral daily  polyethylene glycol 3350 17 Gram(s) Oral daily  saline laxative (FLEET) Rectal Enema 1 Enema Rectal once  senna 2 Tablet(s) Oral at bedtime    MEDICATIONS  (PRN):  aluminum hydroxide/magnesium hydroxide/simethicone Suspension 30 milliLiter(s) Oral four times a day PRN Indigestion  benzocaine 15 mG/menthol 3.6 mG (Sugar-Free) Lozenge 1 Lozenge Oral every 3 hours PRN Sore Throat  magnesium hydroxide Suspension 30 milliLiter(s) Oral daily PRN Constipation  morphine  - Injectable 2 milliGRAM(s) IV Push every 6 hours PRN breakthrough pain  ondansetron Injectable 4 milliGRAM(s) IV Push every 6 hours PRN Nausea and/or Vomiting  traMADol 50 milliGRAM(s) Oral daily PRN 30 minutes prior to Physical Therapy  traMADol 50 milliGRAM(s) Oral every 6 hours PRN Severe Pain (7 - 10)  traMADol 25 milliGRAM(s) Oral every 4 hours PRN Moderate Pain (4 - 6)      Allergies    No Known Allergies    Social History:  Lives w/ wife.  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  Per wife, "easily confused."  Denies current or past tobacco use.  Infrequent etoh use.  CBD pills for pain.       REVIEW OF SYSTEMS:   CONSTITUTIONAL: No fever, No chills, No fatigue, No myalgia, No Body ache, No Weakness  EYES: No eye pain,  No visual disturbances, No discharge, NO Redness  ENMT:  No ear pain, No nose bleed, No vertigo; No sinus or throat pain, No Congestion  NECK: No pain, No stiffness  RESPIRATORY: No cough, wheezing, No  hemoptysis, No shortness of breath  CARDIOVASCULAR: No chest pain, palpitations  GASTROINTESTINAL: No abdominal or epigastric pain. No nausea, No vomiting; No diarrhea or constipation. [ x ] BM  GENITOURINARY: No dysuria, No frequency, No urgency, No hematuria, or incontinence  NEUROLOGICAL: No headaches, No dizziness, No numbness, No tingling, No tremors, No weakness  EXT: No Swelling, No Pain, No Edema  SKIN:  [ x ] No itching, burning, rashes, or lesions   MUSCULOSKELETAL: No joint pain or swelling; No muscle pain, No back pain, No extremity pain  PSYCHIATRIC: No depression, anxiety, mood swings or difficulty sleeping at night  PAIN SCALE: [  ] None  [ x ] Other-left hip pain  ROS Unable to obtain due to - [  ] Dementia  [  ] Lethargy  [  ] Sedated [  ] non verbal  REST OF REVIEW Of SYSTEM - [x  ] Normal       Vital Signs Last 24 Hrs  T(C): 36.6 (26 Apr 2020 05:55), Max: 36.9 (25 Apr 2020 22:15)  T(F): 97.9 (26 Apr 2020 05:55), Max: 98.5 (25 Apr 2020 22:15)  HR: 85 (26 Apr 2020 05:55) (76 - 86)  BP: 163/81 (26 Apr 2020 05:55) (127/77 - 164/77)  BP(mean): --  RR: 17 (26 Apr 2020 05:55) (17 - 18)  SpO2: 95% (26 Apr 2020 05:55) (92% - 99%)      I&O's Summary            PHYSICAL EXAM:   GENERAL:  [x  ] NAD , [  x] well appearing, [  ] Agitated, [  ] Drowsy,  [  ] Lethargy, [  ] confused   HEAD:  [x  ] Normal, [  ] Other  EYES:  [x  ] EOMI, [x  ] PERRLA, [  ] conjunctiva and sclera clear normal, [  ] Other,  [  ] Pallor,[  ] Discharge  ENMT:  [x ] Normal, [x  ] Dry  mucous membranes, [ x ] Good dentition, [x  ] No Thrush  NECK:  [x  ] Supple, [ x ] No JVD, [ x ] Normal thyroid, [  ] Lymphadenopathy [  ] Other  CHEST/LUNG:  [x  ] Clear to auscultation bilaterally, [ x ] Breath Sounds equal ,  [x  ] No rales, [x  ] No rhonchi  [x  ]  No wheezing  HEART:  [x  ] Regular rate and rhythm, [  ] tachycardia, [  ] Bradycardia,  [  ] irregular  [ x ] No murmurs, No rubs, No gallops, [  ] PPM in place (Mfr:  )  ABDOMEN:  [x  ] Soft, [ x ] Nontender, [ x ] Nondistended, [ x ] No mass, [ x ] Bowel sounds present, [x  ] obese  NERVOUS SYSTEM:  [ x ] Alert & Oriented X 1-, [ x ] Nonfocal  [x ] Confusion  [  ] Encephalopathic [  ] Sedated [  ] Unable to assess, [ x ] Other-? Dementia  EXTREMITIES: [ x ] 2+ Peripheral Pulses, No clubbing, No cyanosis, Left Hip dressing, mild swelling [  ] edema B/L lower EXT. [  ] PVD stasis skin changes B/L Lower EXT  LYMPH: No lymphadenopathy noted  SKIN:  [ x ] No rashes or lesions, [  ] Pressure Ulcers, [  ] ecchymosis, [  ] Skin Tears, [  ] Other      DIET: Regular    LABS:                          8.7    10.92 )-----------( 162      ( 26 Apr 2020 09:15 )             28.7       04-26    139  |  104  |  27<H>  ----------------------------<  112<H>  3.8   |  28  |  1.30    Ca    8.7      26 Apr 2020 09:15          Anemia Panel:  Ferritin, Serum: 57 ng/mL (04-24-20 @ 11:06)  Folate, Serum: 4.6 ng/mL (04-24-20 @ 11:06)  Vitamin B12, Serum: 706 pg/mL (04-24-20 @ 11:06)  Iron Total, Serum: 23 ug/dL (04-24-20 @ 11:05)  Iron - Total Binding Capacity.: 356 ug/dL (04-24-20 @ 11:05)      Thyroid Panel:      RADIOLOGY & ADDITIONAL TESTS  < from: Xray Hip w/ Pelvis Min 4 Views, Left (04.24.20 @ 13:13) >  EXAM:  XR HIP WITH PELV MIN 4V LT                            PROCEDURE DATE:  04/24/2020          INTERPRETATION:  Evaluate left hip hemiarthroplasty    AP pelvis and 2 views left hip.    There is a left hip hemiarthroplasty with intact hardware satisfactory alignment. Postoperative clips left inguinal region. There is a right THR with normal appearance. Osseous pelvic structures intact unremarkable. Degenerative change in the visualized lower lumbar spine.    Impression: Satisfactory postoperative appearance left hip hemiarthroplasty        JG GREENE M.D., ATTENDING RADIOLOGIST  This document has been electronically signed. Apr 24 2020 11:36AM    < end of copied text >        HEALTH ISSUES - PROBLEM Dx:  Anemia: Anemia  Leukocytosis: Leukocytosis  Need for prophylactic measure: Need for prophylactic measure  BPH (benign prostatic hyperplasia): BPH (benign prostatic hyperplasia)  High cholesterol: High cholesterol  CAD (coronary artery disease): CAD (coronary artery disease)  Hypertension: Hypertension  MAYRA (acute kidney injury): MAYRA (acute kidney injury)  Closed fracture of left hip, initial encounter: Closed fracture of left hip, initial encounter          Consultant(s) Notes Reviewed:  [x] YES     Care Discussed with [X] Consultants  [x  ] Patient  [ x ] Family-wife [  ]   [  ] Social Service  [ x ] RN, [  ] Physical Therapy  DVT PPX: [  ] Lovenox, [  ] S C Heparin, [  ] Coumadin, [  ] Xarelto, [  ] Eliquis, [  ] Pradaxa, [  ] IV Heparin drip, [ x] SCD [  ] Contraindication 2 to GI Bleed,[  ] Ambulation  Advanced directive: [x  ] None, [  ] DNR/DNI Patient is a 85y old  Male who presents with a chief complaint of Left hip fracture (subcapital left femoral neck fracture)  Patients cell #: [phone] (26 Apr 2020 10:10)        84y/o M with PMH of HTN, HLD, CAD (s/p open heart surgery 5yrs ago), chronic pain (s/p knee and rt hip replacement), BPH, and mild confusion (per wife) BIBEMS to [facility] ED s/p fall. Per wife, whom patient lives with, patient was ambulating with his cane when he lost balance and fell. Patient was in normal state of health prior to this time. Patient is often is "off balance." Wife was unable to get him off ground so called fire department. Fire department said he was fine, and put him into bed. Wife noticed his left hip looked like it was "sticking out" and leg appeared "out-turned." Wife was going to take patient for x-rays today, however early this morning, patient fell out of bed so she decided to call EMS. Patient w/ difficulty bearing weight. Denies LOC, headache, neck or back pain. Denies CP, palpitations, SOB, cough, n/v/d. Has been eating and drinking well. Patient has been quarantined with his wife in their home for 5 weeks.    Fever or chills: yes [  ]   no [ X]  Fatigue, malaise or generalized weakness: yes [  ]   no [ X ]  Shortness of breath/dyspnea: yes [  ]   no [  x]  Cough: yes [  ]   no [x  ]  Anorexia/po intolerance: yes [  ]   no [ x ]  Chest pain or chest tightness: yes [  ]   no [ x ]  Myalgias: yes [  ]   no [ x ]  Headache: yes [  ]   no [ x ]  Sore throat: yes [  ]   no [ x ]  Rhinorrhea: yes [  ]   no [x  ]  Abd pain: yes [  ]   no [ x ]  Nausea: yes [  ]   no [x  ]  Vomiting: yes [  ]   no [  x]  Diarrhea: yes [  ]   no [ x ]  Loss of sense of smell/anosmia: yes [  ]   no [x  ]  Conjunctivitis: yes [  ]   no [x  ]  Recent travel: yes [  ]   no [ x ]  Any sick contacts: yes [  ]   no [x  ]  Close contact with someone confirmed positive with COVID-19 / SARS-CoV2 in the last 14 days: yes [  ]   no [x  ]    In ED,  Vitals: T 98.9F, HR 83, /82, RR 16, SpO2 4L NC  Labs significant for: WBC 13.57, N/L ratio 15.9, H/H 9.4/30.1, BUN 46, Cr 1.8  EKG: NSR at 78bpm  CT head non cont:  no acute intracranial hemorrhage or mass effect.  CT cervical spine non cont: no evidence of cervical spine fracture.  CXR: no acute pulmonary process  Left hip xray: subcapital left femoral neck fracture, marked external rotation of hip, age indeterminant fracture deformity involving the left ischiopubic ramus  COVID PCR obtained while in ED.    INTERVAL HPI:  4/23/20: Pt seen and examined. Planned on OR today. cardio clearance obtained. Patient kept NPO pending procedure. COVID 19 -NEG, Pt unable to urinate. chronic anemia  4/24/20: Pt seen and examined. S/p Hemiarthroplasty POD #1. Seen by Heme/onc for anemia, Low H/H  likely multifactorial. start on IV Venofer x 3 doses. Mild leukocytosis.  4/25/20: Pt seen and examined. S/p Hemiarthroplasty POD #2. H/H low today requiring 1x PRBC transfusion and started on venofer x3 days by heme.   4/26/20: Pt seen and examined. S/p Hemiarthroplasty POD3. S/p 1u PRBC transfusion yesterday, on venofer. H/H stable today. Switched to PO xarelto for dvt ppx postop. d/c planning    OVERNIGHT EVENTS: none    Home Medications:  Amitiza 24 mcg oral capsule: 1 cap(s) orally 2 times a day (23 Apr 2020 11:40)  atorvastatin 10 mg oral tablet: 1 tab(s) orally once a day (23 Apr 2020 11:40)  escitalopram 20 mg oral tablet: 1 tab(s) orally once a day (23 Apr 2020 11:40)  finasteride 5 mg oral tablet: 1 tab(s) orally once a day (23 Apr 2020 11:40)  Metoprolol Succinate ER 50 mg oral tablet, extended release: 1 tab(s) orally once a day (23 Apr 2020 11:40)  traMADol 150 mg/24 hours oral capsule, extended release: 1 cap(s) orally once a day (23 Apr 2020 11:40)      MEDICATIONS  (STANDING):  acetaminophen   Tablet .. 650 milliGRAM(s) Oral every 6 hours  ascorbic acid 500 milliGRAM(s) Oral two times a day  atorvastatin 10 milliGRAM(s) Oral at bedtime  escitalopram 20 milliGRAM(s) Oral daily  finasteride 5 milliGRAM(s) Oral daily  folic acid 1 milliGRAM(s) Oral daily  heparin   Injectable 5000 Unit(s) SubCutaneous every 8 hours  iron sucrose Injectable 100 milliGRAM(s) IV Push every 24 hours  melatonin 3 milliGRAM(s) Oral at bedtime  metoprolol succinate ER 50 milliGRAM(s) Oral daily  multivitamin 1 Tablet(s) Oral daily  polyethylene glycol 3350 17 Gram(s) Oral daily  saline laxative (FLEET) Rectal Enema 1 Enema Rectal once  senna 2 Tablet(s) Oral at bedtime    MEDICATIONS  (PRN):  aluminum hydroxide/magnesium hydroxide/simethicone Suspension 30 milliLiter(s) Oral four times a day PRN Indigestion  benzocaine 15 mG/menthol 3.6 mG (Sugar-Free) Lozenge 1 Lozenge Oral every 3 hours PRN Sore Throat  magnesium hydroxide Suspension 30 milliLiter(s) Oral daily PRN Constipation  morphine  - Injectable 2 milliGRAM(s) IV Push every 6 hours PRN breakthrough pain  ondansetron Injectable 4 milliGRAM(s) IV Push every 6 hours PRN Nausea and/or Vomiting  traMADol 50 milliGRAM(s) Oral daily PRN 30 minutes prior to Physical Therapy  traMADol 50 milliGRAM(s) Oral every 6 hours PRN Severe Pain (7 - 10)  traMADol 25 milliGRAM(s) Oral every 4 hours PRN Moderate Pain (4 - 6)      Allergies    No Known Allergies    Social History:  Lives w/ wife.  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  Per wife, "easily confused."  Denies current or past tobacco use.  Infrequent etoh use.  CBD pills for pain.       REVIEW OF SYSTEMS: i am better  CONSTITUTIONAL: No fever, No chills, No fatigue, No myalgia, No Body ache, No Weakness  EYES: No eye pain,  No visual disturbances, No discharge, NO Redness  ENMT:  No ear pain, No nose bleed, No vertigo; No sinus or throat pain, No Congestion  NECK: No pain, No stiffness  RESPIRATORY: No cough, wheezing, No  hemoptysis, No shortness of breath  CARDIOVASCULAR: No chest pain, palpitations  GASTROINTESTINAL: No abdominal or epigastric pain. No nausea, No vomiting; No diarrhea or constipation. [ x ] BM  GENITOURINARY: No dysuria, No frequency, No urgency, No hematuria, or incontinence  NEUROLOGICAL: No headaches, No dizziness, No numbness, No tingling, No tremors, No weakness  EXT: No Swelling, No Pain, No Edema  SKIN:  [ x ] No itching, burning, rashes, or lesions   MUSCULOSKELETAL: No joint pain or swelling; No muscle pain, No back pain, No extremity pain  PSYCHIATRIC: No depression, anxiety, mood swings or difficulty sleeping at night  PAIN SCALE: [  ] None  [ x ] Other-left hip pain  ROS Unable to obtain due to - [  ] Dementia  [  ] Lethargy  [  ] Sedated [  ] non verbal  REST OF REVIEW Of SYSTEM - [x  ] Normal       Vital Signs Last 24 Hrs  T(C): 36.6 (26 Apr 2020 05:55), Max: 36.9 (25 Apr 2020 22:15)  T(F): 97.9 (26 Apr 2020 05:55), Max: 98.5 (25 Apr 2020 22:15)  HR: 85 (26 Apr 2020 05:55) (76 - 86)  BP: 163/81 (26 Apr 2020 05:55) (127/77 - 164/77)  BP(mean): --  RR: 17 (26 Apr 2020 05:55) (17 - 18)  SpO2: 95% (26 Apr 2020 05:55) (92% - 99%)      I&O's Summary            PHYSICAL EXAM:   GENERAL:  [x  ] NAD , [  x] well appearing, [  ] Agitated, [  ] Drowsy,  [  ] Lethargy, [  ] confused   HEAD:  [x  ] Normal, [  ] Other  EYES:  [x  ] EOMI, [x  ] PERRLA, [  ] conjunctiva and sclera clear normal, [  ] Other,  [  ] Pallor,[  ] Discharge  ENMT:  [x ] Normal, [x  ] Dry  mucous membranes, [ x ] Good dentition, [x  ] No Thrush  NECK:  [x  ] Supple, [ x ] No JVD, [ x ] Normal thyroid, [  ] Lymphadenopathy [  ] Other  CHEST/LUNG:  [x  ] Clear to auscultation bilaterally, [ x ] Breath Sounds equal ,  [x  ] No rales, [x  ] No rhonchi  [x  ]  No wheezing  HEART:  [x  ] Regular rate and rhythm, [  ] tachycardia, [  ] Bradycardia,  [  ] irregular  [ x ] No murmurs, No rubs, No gallops, [  ] PPM in place (Mfr:  )  ABDOMEN:  [x  ] Soft, [ x ] Nontender, [ x ] Nondistended, [ x ] No mass, [ x ] Bowel sounds present, [x  ] obese  NERVOUS SYSTEM:  [ x ] Alert & Oriented X 1-,2 [ x ] Nonfocal  [x ] Confusion  [  ] Encephalopathic [  ] Sedated [  ] Unable to assess, [ x ] Other-? Dementia  EXTREMITIES: [ x ] 2+ Peripheral Pulses, No clubbing, No cyanosis, Left Hip dressing, mild swelling [  ] edema B/L lower EXT. [  ] PVD stasis skin changes B/L Lower EXT  LYMPH: No lymphadenopathy noted  SKIN:  [ x ] No rashes or lesions, [  ] Pressure Ulcers, [  ] ecchymosis, [  ] Skin Tears, [  ] Other      DIET: Regular    LABS:                          8.7    10.92 )-----------( 162      ( 26 Apr 2020 09:15 )             28.7       04-26    139  |  104  |  27<H>  ----------------------------<  112<H>  3.8   |  28  |  1.30    Ca    8.7      26 Apr 2020 09:15          Anemia Panel:  Ferritin, Serum: 57 ng/mL (04-24-20 @ 11:06)  Folate, Serum: 4.6 ng/mL (04-24-20 @ 11:06)  Vitamin B12, Serum: 706 pg/mL (04-24-20 @ 11:06)  Iron Total, Serum: 23 ug/dL (04-24-20 @ 11:05)  Iron - Total Binding Capacity.: 356 ug/dL (04-24-20 @ 11:05)      Thyroid Panel:      RADIOLOGY & ADDITIONAL TESTS  < from: Xray Hip w/ Pelvis Min 4 Views, Left (04.24.20 @ 13:13) >  EXAM:  XR HIP WITH PELV MIN 4V LT                            PROCEDURE DATE:  04/24/2020          INTERPRETATION:  Evaluate left hip hemiarthroplasty    AP pelvis and 2 views left hip.    There is a left hip hemiarthroplasty with intact hardware satisfactory alignment. Postoperative clips left inguinal region. There is a right THR with normal appearance. Osseous pelvic structures intact unremarkable. Degenerative change in the visualized lower lumbar spine.    Impression: Satisfactory postoperative appearance left hip hemiarthroplasty        JG GREENE M.D., ATTENDING RADIOLOGIST  This document has been electronically signed. Apr 24 2020 11:36AM    < end of copied text >        HEALTH ISSUES - PROBLEM Dx:  Anemia: Anemia  Leukocytosis: Leukocytosis  Need for prophylactic measure: Need for prophylactic measure  BPH (benign prostatic hyperplasia): BPH (benign prostatic hyperplasia)  High cholesterol: High cholesterol  CAD (coronary artery disease): CAD (coronary artery disease)  Hypertension: Hypertension  MAYRA (acute kidney injury): MAYRA (acute kidney injury)  Closed fracture of left hip, initial encounter: Closed fracture of left hip, initial encounter          Consultant(s) Notes Reviewed:  [x] YES     Care Discussed with [X] Consultants  [x  ] Patient  [ x ] Family-wife [  ]   [  ] Social Service  [ x ] RN, [  ] Physical Therapy  DVT PPX: [  ] Lovenox, [  ] S C Heparin, [  ] Coumadin, [  ] Xarelto, [  ] Eliquis, [  ] Pradaxa, [  ] IV Heparin drip, [ x] SCD [  ] Contraindication 2 to GI Bleed,[  ] Ambulation  Advanced directive: [x  ] None, [  ] DNR/DNI

## 2020-04-26 NOTE — PROGRESS NOTE ADULT - SUBJECTIVE AND OBJECTIVE BOX
Interval History:  continues post op. received 1 unit PRBC without complication    Chart reviewed and events noted;   Overnight events:    MEDICATIONS  (STANDING):  acetaminophen   Tablet .. 650 milliGRAM(s) Oral every 6 hours  ascorbic acid 500 milliGRAM(s) Oral two times a day  atorvastatin 10 milliGRAM(s) Oral at bedtime  escitalopram 20 milliGRAM(s) Oral daily  finasteride 5 milliGRAM(s) Oral daily  folic acid 1 milliGRAM(s) Oral daily  heparin   Injectable 5000 Unit(s) SubCutaneous every 8 hours  iron sucrose Injectable 100 milliGRAM(s) IV Push every 24 hours  melatonin 3 milliGRAM(s) Oral at bedtime  metoprolol succinate ER 50 milliGRAM(s) Oral daily  multivitamin 1 Tablet(s) Oral daily  polyethylene glycol 3350 17 Gram(s) Oral daily  senna 2 Tablet(s) Oral at bedtime    MEDICATIONS  (PRN):  aluminum hydroxide/magnesium hydroxide/simethicone Suspension 30 milliLiter(s) Oral four times a day PRN Indigestion  benzocaine 15 mG/menthol 3.6 mG (Sugar-Free) Lozenge 1 Lozenge Oral every 3 hours PRN Sore Throat  magnesium hydroxide Suspension 30 milliLiter(s) Oral daily PRN Constipation  morphine  - Injectable 2 milliGRAM(s) IV Push every 6 hours PRN breakthrough pain  ondansetron Injectable 4 milliGRAM(s) IV Push every 6 hours PRN Nausea and/or Vomiting  traMADol 50 milliGRAM(s) Oral daily PRN 30 minutes prior to Physical Therapy  traMADol 50 milliGRAM(s) Oral every 6 hours PRN Severe Pain (7 - 10)  traMADol 25 milliGRAM(s) Oral every 4 hours PRN Moderate Pain (4 - 6)      Vital Signs Last 24 Hrs  T(C): 36.6 (26 Apr 2020 05:55), Max: 36.9 (25 Apr 2020 22:15)  T(F): 97.9 (26 Apr 2020 05:55), Max: 98.5 (25 Apr 2020 22:15)  HR: 85 (26 Apr 2020 05:55) (76 - 86)  BP: 163/81 (26 Apr 2020 05:55) (127/77 - 164/77)  BP(mean): --  RR: 17 (26 Apr 2020 05:55) (17 - 18)  SpO2: 95% (26 Apr 2020 05:55) (92% - 99%)    PHYSICAL EXAM  General: adult in NAD  HEENT: clear oropharynx, anicteric sclera, pink conjunctivae  Neck: supple  CV: normal S1S2 with no murmur rubs or gallops  Lungs: clear to auscultation, no wheezes, no rhales  Abdomen: soft non-tender non-distended, no hepato/splenomegaly  Ext: no clubbing cyanosis or edema  Skin: no rashes and no petichiae  Neuro: alert and intermittently confused      LABS:  CBC Full  -  ( 26 Apr 2020 09:15 )  WBC Count : 10.92 K/uL  RBC Count : 3.18 M/uL  Hemoglobin : 8.7 g/dL  Hematocrit : 28.7 %  Platelet Count - Automated : 162 K/uL  Mean Cell Volume : 90.3 fl  Mean Cell Hemoglobin : 27.4 pg  Mean Cell Hemoglobin Concentration : 30.3 gm/dL  Auto Neutrophil # : 8.08 K/uL  Auto Lymphocyte # : 1.10 K/uL  Auto Monocyte # : 1.36 K/uL  Auto Eosinophil # : 0.27 K/uL  Auto Basophil # : 0.06 K/uL  Auto Neutrophil % : 73.9 %  Auto Lymphocyte % : 10.1 %  Auto Monocyte % : 12.5 %  Auto Eosinophil % : 2.5 %  Auto Basophil % : 0.5 %    04-26    139  |  104  |  27<H>  ----------------------------<  112<H>  3.8   |  28  |  1.30    Ca    8.7      26 Apr 2020 09:15          fe studies  Ferritin, Serum: 57 ng/mL (04-24 @ 11:06)  Iron - Total Binding Capacity.: 356 ug/dL (04-24 @ 11:05)      WBC trend  10.92 K/uL (04-26-20 @ 09:15)  12.96 K/uL (04-25-20 @ 07:10)  14.40 K/uL (04-24-20 @ 09:26)  13.29 K/uL (04-23-20 @ 18:02)      Hgb trend  8.7 g/dL (04-26-20 @ 09:15)  8.2 g/dL (04-25-20 @ 17:01)  7.4 g/dL (04-25-20 @ 07:10)  7.7 g/dL (04-24-20 @ 09:26)  8.6 g/dL (04-23-20 @ 18:02)      plt trend  162 K/uL (04-26-20 @ 09:15)  142 K/uL (04-25-20 @ 07:10)  142 K/uL (04-24-20 @ 09:26)  150 K/uL (04-23-20 @ 18:02)        RADIOLOGY & ADDITIONAL STUDIES:

## 2020-04-26 NOTE — PROGRESS NOTE ADULT - PROBLEM SELECTOR PLAN 5
BUN 46, Cr 1.8 on admission, Encourage PO fluids  - Cr 1.3, GFR 50  - patient likely has underlying ckd   - Per wife, no diagnosed hx of kidney disease  - continue to monitor

## 2020-04-26 NOTE — PROGRESS NOTE ADULT - PROBLEM SELECTOR PLAN 1
Admit to general medical floors  - Left hip xray: subcapital left femoral neck fracture, marked external rotation of hip, age indeterminant fracture deformity involving the left ischiopubic ramus  - CT head and cervical spine w/ no acute findings   - Pt w/ left hip fx s/p fall  - POD 3 s/p hip hemiarthroplasty  - PT consulted - patient to received 50 mg tramadol 30 minutes prior to PT  - Bedrest, fall risk protocol   - Spoke to family; who reports that patient had severe AMS and agitation when given dilaudid or oxycodone. Will order tramadol 50 PRN for severe, tramadol 25 PRN for moderate, tylenol Q6H for pain, and morphine for breakthrough pain. Admit to general medical floors  - Left hip xray: subcapital left femoral neck fracture, marked external rotation of hip, age indeterminant fracture deformity involving the left ischiopubic ramus  - CT head and cervical spine w/ no acute findings   - Pt w/ left hip fx s/p fall  - POD  # 3 s/p hip hemiarthroplasty  - PT consulted - patient to received 50 mg tramadol 30 minutes prior to PT  - Bedrest, fall risk protocol   - Spoke to family; who reports that patient had severe AMS and agitation when given Dilaudid or oxycodone. Will order tramadol 50 PRN for severe, tramadol 25 PRN for moderate, tylenols Q6H for pain, and morphine for breakthrough pain.

## 2020-04-26 NOTE — PROGRESS NOTE ADULT - PROBLEM SELECTOR PLAN 4
- admission d-dimer elevated likely 2/2 acute injury and surgery as well as underlying ckd, and age   - patient HD stable, saturating well on room air, non tachycardic, low suspicion for PE at this time  - repeat ddimer 831 (was 4033)

## 2020-04-26 NOTE — PROGRESS NOTE ADULT - ASSESSMENT
86y/o M with PMH of HTN, HLD, CAD (s/p open heart surgery 5yrs ago), chronic pain (s/p knee and rt hip replacement), BPH, and mild confusion (per wife) BIBEMS to [facility] ED s/p fall, admitted for left hip fracture. S/p hip hemiarthroplasty POD3

## 2020-04-26 NOTE — PROGRESS NOTE ADULT - SUBJECTIVE AND OBJECTIVE BOX
[facility] -- Estrada Bingham, Rg, Axel, Guille Emanuel Savella  Office # [phone]      Follow Up:    Preop eval/Post follow up   Subjective/Observations:   No events overnight resting comfortably in bed.  No complaints of chest pain, dyspnea, or palpitations reported. No signs of orthopnea or PND.     REVIEW OF SYSTEMS: All other review of systems is negative unless indicated above    PAST MEDICAL & SURGICAL HISTORY:  Anemia  S/P AVR (aortic valve replacement)  Mild CAD: Non Obstructive CAD  Chronic pain: s/p knee and hip replacements  High cholesterol  BPH (benign prostatic hyperplasia)  Hypertension  S/P AVR (aortic valve replacement)  History of total hip replacement, right  History of total right knee replacement (TKR): X2      MEDICATIONS  (STANDING):  acetaminophen   Tablet .. 650 milliGRAM(s) Oral every 6 hours  ascorbic acid 500 milliGRAM(s) Oral two times a day  atorvastatin 10 milliGRAM(s) Oral at bedtime  escitalopram 20 milliGRAM(s) Oral daily  finasteride 5 milliGRAM(s) Oral daily  folic acid 1 milliGRAM(s) Oral daily  heparin   Injectable 5000 Unit(s) SubCutaneous every 8 hours  iron sucrose Injectable 100 milliGRAM(s) IV Push every 24 hours  melatonin 3 milliGRAM(s) Oral at bedtime  metoprolol succinate ER 50 milliGRAM(s) Oral daily  multivitamin 1 Tablet(s) Oral daily  polyethylene glycol 3350 17 Gram(s) Oral daily  senna 2 Tablet(s) Oral at bedtime    MEDICATIONS  (PRN):  aluminum hydroxide/magnesium hydroxide/simethicone Suspension 30 milliLiter(s) Oral four times a day PRN Indigestion  benzocaine 15 mG/menthol 3.6 mG (Sugar-Free) Lozenge 1 Lozenge Oral every 3 hours PRN Sore Throat  magnesium hydroxide Suspension 30 milliLiter(s) Oral daily PRN Constipation  morphine  - Injectable 2 milliGRAM(s) IV Push every 6 hours PRN breakthrough pain  ondansetron Injectable 4 milliGRAM(s) IV Push every 6 hours PRN Nausea and/or Vomiting  traMADol 50 milliGRAM(s) Oral daily PRN 30 minutes prior to Physical Therapy  traMADol 50 milliGRAM(s) Oral every 6 hours PRN Severe Pain (7 - 10)  traMADol 25 milliGRAM(s) Oral every 4 hours PRN Moderate Pain (4 - 6)      Allergies    No Known Allergies    Intolerances        Vital Signs Last 24 Hrs  T(C): 36.6 (26 Apr 2020 05:55), Max: 36.9 (25 Apr 2020 22:15)  T(F): 97.9 (26 Apr 2020 05:55), Max: 98.5 (25 Apr 2020 22:15)  HR: 85 (26 Apr 2020 05:55) (76 - 86)  BP: 163/81 (26 Apr 2020 05:55) (127/77 - 164/77)  BP(mean): --  RR: 17 (26 Apr 2020 05:55) (17 - 18)  SpO2: 95% (26 Apr 2020 05:55) (92% - 99%)    I&O's Summary    26 Apr 2020 07:01  -  26 Apr 2020 12:00  --------------------------------------------------------  IN: 0 mL / OUT: 200 mL / NET: -200 mL          PHYSICAL EXAM:  TELE: Off tele   Constitutional: NAD, awake and alert, well-developed  HEENT: Moist Mucous Membranes, Anicteric  Pulmonary: Non-labored, breath sounds are clear bilaterally, No wheezing, crackles or rhonchi  Cardiovascular: Regular, S1 and S2 nl, No murmurs, rubs, gallops or clicks  Gastrointestinal: Bowel Sounds present, soft, nontender.   Lymph: No lymphadenopathy. No peripheral edema.  Skin: No visible rashes or ulcers.  Psych:  Mood & affect appropriate    LABS: All Labs Reviewed:                        8.7    10.92 )-----------( 162      ( 26 Apr 2020 09:15 )             28.7                         8.2    x     )-----------( x        ( 25 Apr 2020 17:01 )             26.6                         7.4    12.96 )-----------( 142      ( 25 Apr 2020 07:10 )             23.7     26 Apr 2020 09:15    139    |  104    |  27     ----------------------------<  112    3.8     |  28     |  1.30   25 Apr 2020 07:10    139    |  105    |  36     ----------------------------<  99     4.3     |  27     |  1.50   24 Apr 2020 09:26    139    |  105    |  38     ----------------------------<  128    4.0     |  25     |  1.70     Ca    8.7        26 Apr 2020 09:15  Ca    8.5        25 Apr 2020 07:10  Ca    8.6        24 Apr 2020 09:26  Phos  3.9       24 Apr 2020 09:26  Mg     2.2       24 Apr 2020 09:26    TPro  6.4    /  Alb  3.2    /  TBili  0.5    /  DBili  x      /  AST  71     /  ALT  32     /  AlkPhos  61     24 Apr 2020 09:26             ECG:  < from: 12 Lead ECG (04.23.20 @ 05:57) >  Ventricular Rate 78 BPM    Atrial Rate 78 BPM    P-R Interval 178 ms    QRS Duration 82 ms    Q-T Interval 398 ms    QTC Calculation(Bezet) 453 ms    P Axis 68 degrees    R Axis 60 degrees    T Axis 54 degrees    Diagnosis Line Normal sinus rhythm  Possible Left atrial enlargement  Borderline ECG  No previous ECGs available  Confirmed by Rg NIELSON, Christiano ([id number]) on 4/23/2020 2:45:18 PM    < end of copied text >    Echo:

## 2020-04-26 NOTE — PROGRESS NOTE ADULT - ASSESSMENT
Anemia multifactorial in etiology related to recent left hip fracture and underlying chronic disease. has renal insufficiency on labs.  Patient appears stable s/p surgery  fe studies are equivical and with post surgery and renal insufficiency to give venofer for 3 days  hgb 8.7 s/p transfusion     Recommendations:  1.  follow CBC   2.  continue venofer  3.  continue post op orthopedic and medical care  4.  is hematologically stable for discharge and should have outpatient followup of blood count  discussed with Dr. Jin

## 2020-04-26 NOTE — PROGRESS NOTE ADULT - PROBLEM SELECTOR PLAN 10
- continue home med Lexapro 20 mg daily  - DVT ppx with heparin Q8H  -d/c planning - continue home med Lexapro 20 mg daily  - DVT ppx with xarelto  -d/c planning - continue home med Lexapro 20 mg daily  - DVT ppx with Xarelto  -d/c planning

## 2020-04-26 NOTE — PROGRESS NOTE ADULT - ASSESSMENT
85 year old male with PMH of HTN, HLD, CAD (s/p open heart surgery 5yrs ago), chronic pain (s/p knee and rt hip replacement), BPH, and mild confusion (per wife) BIBEMS to [facility] ED s/p fall, admitted for left hip fracture.    L Hip Hemiarthroplasty   -W/O cardiac complications   -Per ortho  -Analgesia  -Encourage incentive spirometry      MAYRA   -resolved  -per medicine      Anemia.  -S/P I unit PRBC   -per medicine    CAD  Continue Toprol xl 50mg daily   Continue 10mg atorvastatin daily      Monitor and replete electrolytes. Keep K>4.0 and Mg>2.0.    Christiano Salazar [facility]  Cardiology   Spectra #[id number]  [phone]

## 2020-04-27 ENCOUNTER — TRANSCRIPTION ENCOUNTER (OUTPATIENT)
Age: 85
End: 2020-04-27

## 2020-04-27 LAB
ANION GAP SERPL CALC-SCNC: 7 MMOL/L — SIGNIFICANT CHANGE UP (ref 5–17)
BUN SERPL-MCNC: 26 MG/DL — HIGH (ref 7–23)
CALCIUM SERPL-MCNC: 8.9 MG/DL — SIGNIFICANT CHANGE UP (ref 8.5–10.1)
CHLORIDE SERPL-SCNC: 105 MMOL/L — SIGNIFICANT CHANGE UP (ref 96–108)
CO2 SERPL-SCNC: 28 MMOL/L — SIGNIFICANT CHANGE UP (ref 22–31)
CREAT SERPL-MCNC: 1.2 MG/DL — SIGNIFICANT CHANGE UP (ref 0.5–1.3)
GLUCOSE SERPL-MCNC: 112 MG/DL — HIGH (ref 70–99)
HCT VFR BLD CALC: 31.1 % — LOW (ref 39–50)
HGB BLD-MCNC: 9.3 G/DL — LOW (ref 13–17)
MCHC RBC-ENTMCNC: 27.5 PG — SIGNIFICANT CHANGE UP (ref 27–34)
MCHC RBC-ENTMCNC: 29.9 GM/DL — LOW (ref 32–36)
MCV RBC AUTO: 92 FL — SIGNIFICANT CHANGE UP (ref 80–100)
NRBC # BLD: 0 /100 WBCS — SIGNIFICANT CHANGE UP (ref 0–0)
PLATELET # BLD AUTO: 207 K/UL — SIGNIFICANT CHANGE UP (ref 150–400)
POTASSIUM SERPL-MCNC: 3.5 MMOL/L — SIGNIFICANT CHANGE UP (ref 3.5–5.3)
POTASSIUM SERPL-SCNC: 3.5 MMOL/L — SIGNIFICANT CHANGE UP (ref 3.5–5.3)
RBC # BLD: 3.38 M/UL — LOW (ref 4.2–5.8)
RBC # FLD: 14.4 % — SIGNIFICANT CHANGE UP (ref 10.3–14.5)
SODIUM SERPL-SCNC: 140 MMOL/L — SIGNIFICANT CHANGE UP (ref 135–145)
T3 SERPL-MCNC: 66 NG/DL — LOW (ref 80–200)
T4 AB SER-ACNC: 5.9 UG/DL — SIGNIFICANT CHANGE UP (ref 4.6–12)
TSH SERPL-MCNC: 2.03 UIU/ML — SIGNIFICANT CHANGE UP (ref 0.36–3.74)
WBC # BLD: 9.75 K/UL — SIGNIFICANT CHANGE UP (ref 3.8–10.5)
WBC # FLD AUTO: 9.75 K/UL — SIGNIFICANT CHANGE UP (ref 3.8–10.5)

## 2020-04-27 PROCEDURE — 99232 SBSQ HOSP IP/OBS MODERATE 35: CPT

## 2020-04-27 RX ORDER — RIVAROXABAN 15 MG-20MG
1 KIT ORAL
Qty: 30 | Refills: 0
Start: 2020-04-27 | End: 2020-05-26

## 2020-04-27 RX ADMIN — Medication 500 MILLIGRAM(S): at 17:44

## 2020-04-27 RX ADMIN — TRAMADOL HYDROCHLORIDE 50 MILLIGRAM(S): 50 TABLET ORAL at 20:53

## 2020-04-27 RX ADMIN — Medication 1 TABLET(S): at 10:05

## 2020-04-27 RX ADMIN — BENZOCAINE AND MENTHOL 1 LOZENGE: 5; 1 LIQUID ORAL at 23:04

## 2020-04-27 RX ADMIN — ATORVASTATIN CALCIUM 10 MILLIGRAM(S): 80 TABLET, FILM COATED ORAL at 20:53

## 2020-04-27 RX ADMIN — TRAMADOL HYDROCHLORIDE 25 MILLIGRAM(S): 50 TABLET ORAL at 17:52

## 2020-04-27 RX ADMIN — Medication 50 MILLIGRAM(S): at 05:28

## 2020-04-27 RX ADMIN — TRAMADOL HYDROCHLORIDE 50 MILLIGRAM(S): 50 TABLET ORAL at 13:36

## 2020-04-27 RX ADMIN — Medication 3 MILLIGRAM(S): at 20:53

## 2020-04-27 RX ADMIN — Medication 500 MILLIGRAM(S): at 05:28

## 2020-04-27 RX ADMIN — ESCITALOPRAM OXALATE 20 MILLIGRAM(S): 10 TABLET, FILM COATED ORAL at 10:04

## 2020-04-27 RX ADMIN — SENNA PLUS 2 TABLET(S): 8.6 TABLET ORAL at 20:53

## 2020-04-27 RX ADMIN — FINASTERIDE 5 MILLIGRAM(S): 5 TABLET, FILM COATED ORAL at 10:04

## 2020-04-27 RX ADMIN — POLYETHYLENE GLYCOL 3350 17 GRAM(S): 17 POWDER, FOR SOLUTION ORAL at 10:05

## 2020-04-27 RX ADMIN — Medication 1 MILLIGRAM(S): at 10:04

## 2020-04-27 RX ADMIN — RIVAROXABAN 10 MILLIGRAM(S): KIT at 10:10

## 2020-04-27 NOTE — PROGRESS NOTE ADULT - ASSESSMENT
A/P:  Patient is a 85y Male s/p L hip judy Stable POD 4    -Medical management per primary team  -Posterior hip precautions  -Abd pillow while in bed/chair at all times  -Ice/Elevate  -Incentive Spirometry  -Multimodal Analgesia  -DVT PE ppx  -SCDs  -PT/OT OOB   -WBAT  -Orthopaedically stable for discharge  -Followup w/ Dr. Gandhi outpatient in 1-2 weeks. Call office to schedule appointment.  -Discharge planning - per primary medical team  -Will discuss w/ attending and advise if plan changes

## 2020-04-27 NOTE — PROGRESS NOTE ADULT - SUBJECTIVE AND OBJECTIVE BOX
[facility] -- Estrada Bingham, Axel Diez Pannella, Patel, Savella  Office # [phone]      Follow Up:    Preop eval/Post follow up   Subjective/Observations:   No events overnight resting comfortably in bed.  No complaints of chest pain, dyspnea, or palpitations reported. No signs of orthopnea or PND.       REVIEW OF SYSTEMS: All other review of systems is negative unless indicated above    PAST MEDICAL & SURGICAL HISTORY:  Anemia  S/P AVR (aortic valve replacement)  Mild CAD: Non Obstructive CAD  Chronic pain: s/p knee and hip replacements  High cholesterol  BPH (benign prostatic hyperplasia)  Hypertension  S/P AVR (aortic valve replacement)  History of total hip replacement, right  History of total right knee replacement (TKR): X2      MEDICATIONS  (STANDING):  ascorbic acid 500 milliGRAM(s) Oral two times a day  atorvastatin 10 milliGRAM(s) Oral at bedtime  escitalopram 20 milliGRAM(s) Oral daily  finasteride 5 milliGRAM(s) Oral daily  folic acid 1 milliGRAM(s) Oral daily  melatonin 3 milliGRAM(s) Oral at bedtime  metoprolol succinate ER 50 milliGRAM(s) Oral daily  multivitamin 1 Tablet(s) Oral daily  polyethylene glycol 3350 17 Gram(s) Oral daily  rivaroxaban 10 milliGRAM(s) Oral daily  senna 2 Tablet(s) Oral at bedtime    MEDICATIONS  (PRN):  aluminum hydroxide/magnesium hydroxide/simethicone Suspension 30 milliLiter(s) Oral four times a day PRN Indigestion  benzocaine 15 mG/menthol 3.6 mG (Sugar-Free) Lozenge 1 Lozenge Oral every 3 hours PRN Sore Throat  magnesium hydroxide Suspension 30 milliLiter(s) Oral daily PRN Constipation  morphine  - Injectable 2 milliGRAM(s) IV Push every 6 hours PRN breakthrough pain  ondansetron Injectable 4 milliGRAM(s) IV Push every 6 hours PRN Nausea and/or Vomiting  traMADol 50 milliGRAM(s) Oral daily PRN 30 minutes prior to Physical Therapy  traMADol 50 milliGRAM(s) Oral every 6 hours PRN Severe Pain (7 - 10)  traMADol 25 milliGRAM(s) Oral every 4 hours PRN Moderate Pain (4 - 6)      Allergies    No Known Allergies    Intolerances        Vital Signs Last 24 Hrs  T(C): 36.9 (27 Apr 2020 05:24), Max: 36.9 (27 Apr 2020 05:24)  T(F): 98.5 (27 Apr 2020 05:24), Max: 98.5 (27 Apr 2020 05:24)  HR: 78 (27 Apr 2020 05:24) (73 - 79)  BP: 152/82 (27 Apr 2020 05:24) (138/76 - 152/82)  BP(mean): --  RR: 18 (27 Apr 2020 05:24) (18 - 18)  SpO2: 95% (27 Apr 2020 05:24) (95% - 99%)    I&O's Summary    26 Apr 2020 07:01  -  27 Apr 2020 07:00  --------------------------------------------------------  IN: 0 mL / OUT: 650 mL / NET: -650 mL          PHYSICAL EXAM:  TELE: Off tele   Constitutional: NAD, awake and alert, well-developed  HEENT: Moist Mucous Membranes, Anicteric  Pulmonary: Non-labored, breath sounds with Bilaterally diminished at bases  No  wheezes, crackles or rhonchi   Cardiovascular: Regular, S1 and S2 nl, No murmurs, rubs, gallops or clicks  Gastrointestinal: Bowel Sounds present, soft, nontender.   Lymph: No lymphadenopathy. No peripheral edema.  Skin: No visible rashes or ulcers.  Psych:  Mood & affect appropriate    LABS: All Labs Reviewed:                        8.7    10.92 )-----------( 162      ( 26 Apr 2020 09:15 )             28.7                         8.2    x     )-----------( x        ( 25 Apr 2020 17:01 )             26.6                         7.4    12.96 )-----------( 142      ( 25 Apr 2020 07:10 )             23.7     26 Apr 2020 09:15    139    |  104    |  27     ----------------------------<  112    3.8     |  28     |  1.30   25 Apr 2020 07:10    139    |  105    |  36     ----------------------------<  99     4.3     |  27     |  1.50   24 Apr 2020 09:26    139    |  105    |  38     ----------------------------<  128    4.0     |  25     |  1.70     Ca    8.7        26 Apr 2020 09:15  Ca    8.5        25 Apr 2020 07:10  Ca    8.6        24 Apr 2020 09:26  Phos  3.9       24 Apr 2020 09:26  Mg     2.2       24 Apr 2020 09:26    TPro  6.4    /  Alb  3.2    /  TBili  0.5    /  DBili  x      /  AST  71     /  ALT  32     /  AlkPhos  61     24 Apr 2020 09:26        ECG:  < from: 12 Lead ECG (04.23.20 @ 05:57) >  Ventricular Rate 78 BPM    Atrial Rate 78 BPM    P-R Interval 178 ms    QRS Duration 82 ms    Q-T Interval 398 ms    QTC Calculation(Bezet) 453 ms    P Axis 68 degrees    R Axis 60 degrees    T Axis 54 degrees    Diagnosis Line Normal sinus rhythm  Possible Left atrial enlargement  Borderline ECG  No previous ECGs available  Confirmed by Rg NIELSON, Christiano ([id number]) on 4/23/2020 2:45:18 PM    < end of copied text >

## 2020-04-27 NOTE — PROGRESS NOTE ADULT - PROBLEM SELECTOR PLAN 1
Admit to general medical floors  - Left hip xray: subcapital left femoral neck fracture, marked external rotation of hip, age indeterminant fracture deformity involving the left ischiopubic ramus  - CT head and cervical spine w/ no acute findings   - Pt w/ left hip fx s/p fall  - POD  # 4 s/p hip hemiarthroplasty  - PT consulted - patient to received 50 mg tramadol 30 minutes prior to PT  - Bedrest, fall risk protocol   - Spoke to family; who reports that patient had severe AMS and agitation when given Dilaudid or oxycodone. Will order tramadol 50 PRN for severe, tramadol 25 PRN for moderate, tylenols Q6H for pain, and morphine for breakthrough pain.

## 2020-04-27 NOTE — PROGRESS NOTE ADULT - SUBJECTIVE AND OBJECTIVE BOX
Patient seen and examined at bedside, resting comfortably. No acute events overnight. Pain adequately controlled. Patient feeling well & requesting to be discharged. No nausea or vomiting. No other acute complaints at this time    Vital Signs Last 24 Hrs  T(C): 36.9 (04-27-20 @ 05:24), Max: 36.9 (04-27-20 @ 05:24)  T(F): 98.5 (04-27-20 @ 05:24), Max: 98.5 (04-27-20 @ 05:24)  HR: 78 (04-27-20 @ 05:24) (73 - 79)  BP: 152/82 (04-27-20 @ 05:24) (138/76 - 152/82)  BP(mean): --  RR: 18 (04-27-20 @ 05:24) (18 - 18)  SpO2: 95% (04-27-20 @ 05:24) (95% - 99%)                        8.7    10.92 )-----------( 162      ( 26 Apr 2020 09:15 )             28.7     26 Apr 2020 09:15    139    |  104    |  27     ----------------------------<  112    3.8     |  28     |  1.30     Ca    8.7        26 Apr 2020 09:15      Exam:  Gen: NAD, Awake and alert, following commands  LLE:  ABD pillow in place  Dressing clean and dry  +EHL/FHL/TA/GS  SILT L2-S1  +DP  Calf Soft NT  Compartments soft and compressible

## 2020-04-27 NOTE — PROGRESS NOTE ADULT - ASSESSMENT
85 year old male with PMH of HTN, HLD, CAD (s/p open heart surgery 5yrs ago), chronic pain (s/p knee and rt hip replacement), BPH, and mild confusion (per wife) BIBEMS to [facility] ED s/p fall, admitted for left hip fracture.    L Hip Hemiarthroplasty   -W/O cardiac complications   -Per ortho  -Analgesia  -Encourage incentive spirometry    MAYRA   -resolved  -per medicine    Anemia.  -S/P I unit PRBC   -per medicine    CAD  -Continue Toprol xl 50mg daily   -Continue 10mg atorvastatin daily    -From a cardiac standpoint the patient may be discharged     Monitor and replete electrolytes. Keep K>4.0 and Mg>2.0.    Christiano Salazar [facility]  Cardiology   Spectra #[phone]

## 2020-04-27 NOTE — PROGRESS NOTE ADULT - PROBLEM SELECTOR PLAN 4
- admission d-dimer elevated likely 2/2 acute injury and surgery as well as underlying ckd, and age   - patient HD stable, saturating well on room air, non tachycardic, low suspicion for PE at this time  - repeat ddimer 831 (was 9285)

## 2020-04-27 NOTE — DISCHARGE NOTE NURSING/CASE MANAGEMENT/SOCIAL WORK - PATIENT PORTAL LINK FT
You can access the FollowMyHealth Patient Portal offered by [facility] by registering at the following website: [email or web address]. By joining [facility]’s FollowMyHealth portal, you will also be able to view your health information using other applications (apps) compatible with our system.

## 2020-04-27 NOTE — PROGRESS NOTE ADULT - ASSESSMENT
84y/o M with PMH of HTN, HLD, CAD (s/p open heart surgery 5yrs ago), chronic pain (s/p knee and rt hip replacement), BPH, and mild confusion (per wife) BIBEMS to [facility] ED s/p fall, admitted for left hip fracture. S/p hip hemiarthroplasty POD4

## 2020-04-27 NOTE — PROGRESS NOTE ADULT - SUBJECTIVE AND OBJECTIVE BOX
Patient is a 85y old  Male who presents with a chief complaint of Left hip fracture (subcapital left femoral neck fracture)  Patients cell #: [phone] (27 Apr 2020 07:00)        86y/o M with PMH of HTN, HLD, CAD (s/p open heart surgery 5yrs ago), chronic pain (s/p knee and rt hip replacement), BPH, and mild confusion (per wife) BIBEMS to [facility] ED s/p fall. Per wife, whom patient lives with, patient was ambulating with his cane when he lost balance and fell. Patient was in normal state of health prior to this time. Patient is often is "off balance." Wife was unable to get him off ground so called fire department. Fire department said he was fine, and put him into bed. Wife noticed his left hip looked like it was "sticking out" and leg appeared "out-turned." Wife was going to take patient for x-rays today, however early this morning, patient fell out of bed so she decided to call EMS. Patient w/ difficulty bearing weight. Denies LOC, headache, neck or back pain. Denies CP, palpitations, SOB, cough, n/v/d. Has been eating and drinking well. Patient has been quarantined with his wife in their home for 5 weeks.    Fever or chills: yes [  ]   no [ X]  Fatigue, malaise or generalized weakness: yes [  ]   no [ X ]  Shortness of breath/dyspnea: yes [  ]   no [  x]  Cough: yes [  ]   no [x  ]  Anorexia/po intolerance: yes [  ]   no [ x ]  Chest pain or chest tightness: yes [  ]   no [ x ]  Myalgias: yes [  ]   no [ x ]  Headache: yes [  ]   no [ x ]  Sore throat: yes [  ]   no [ x ]  Rhinorrhea: yes [  ]   no [x  ]  Abd pain: yes [  ]   no [ x ]  Nausea: yes [  ]   no [x  ]  Vomiting: yes [  ]   no [  x]  Diarrhea: yes [  ]   no [ x ]  Loss of sense of smell/anosmia: yes [  ]   no [x  ]  Conjunctivitis: yes [  ]   no [x  ]  Recent travel: yes [  ]   no [ x ]  Any sick contacts: yes [  ]   no [x  ]  Close contact with someone confirmed positive with COVID-19 / SARS-CoV2 in the last 14 days: yes [  ]   no [x  ]    In ED,  Vitals: T 98.9F, HR 83, /82, RR 16, SpO2 4L NC  Labs significant for: WBC 13.57, N/L ratio 15.9, H/H 9.4/30.1, BUN 46, Cr 1.8  EKG: NSR at 78bpm  CT head non cont:  no acute intracranial hemorrhage or mass effect.  CT cervical spine non cont: no evidence of cervical spine fracture.  CXR: no acute pulmonary process  Left hip xray: subcapital left femoral neck fracture, marked external rotation of hip, age indeterminant fracture deformity involving the left ischiopubic ramus  COVID PCR obtained while in ED.    INTERVAL HPI:  4/23/20: Pt seen and examined. Planned on OR today. cardio clearance obtained. Patient kept NPO pending procedure. COVID 19 -NEG, Pt unable to urinate. chronic anemia  4/24/20: Pt seen and examined. S/p Hemiarthroplasty POD #1. Seen by Heme/onc for anemia, Low H/H  likely multifactorial. start on IV Venofer x 3 doses. Mild leukocytosis.  4/25/20: Pt seen and examined. S/p Hemiarthroplasty POD #2. H/H low today requiring 1x PRBC transfusion and started on venofer x3 days by heme.   4/26/20: Pt seen and examined. S/p Hemiarthroplasty POD3. S/p 1u PRBC transfusion yesterday, on venofer. H/H stable today. Switched to PO xarelto for dvt ppx postop. d/c planning  4/27/20:     OVERNIGHT EVENTS: none    Home Medications:  acetaminophen 325 mg oral tablet: 2 tab(s) orally every 6 hours, As Needed (26 Apr 2020 12:19)  Amitiza 24 mcg oral capsule: 1 cap(s) orally 2 times a day (23 Apr 2020 11:40)  atorvastatin 10 mg oral tablet: 1 tab(s) orally once a day (23 Apr 2020 11:40)  escitalopram 20 mg oral tablet: 1 tab(s) orally once a day (23 Apr 2020 11:40)  finasteride 5 mg oral tablet: 1 tab(s) orally once a day (23 Apr 2020 11:40)  Metoprolol Succinate ER 50 mg oral tablet, extended release: 1 tab(s) orally once a day (23 Apr 2020 11:40)  traMADol 150 mg/24 hours oral capsule, extended release: 1 cap(s) orally once a day (23 Apr 2020 11:40)      MEDICATIONS  (STANDING):  ascorbic acid 500 milliGRAM(s) Oral two times a day  atorvastatin 10 milliGRAM(s) Oral at bedtime  escitalopram 20 milliGRAM(s) Oral daily  finasteride 5 milliGRAM(s) Oral daily  folic acid 1 milliGRAM(s) Oral daily  melatonin 3 milliGRAM(s) Oral at bedtime  metoprolol succinate ER 50 milliGRAM(s) Oral daily  multivitamin 1 Tablet(s) Oral daily  polyethylene glycol 3350 17 Gram(s) Oral daily  rivaroxaban 10 milliGRAM(s) Oral daily  senna 2 Tablet(s) Oral at bedtime    MEDICATIONS  (PRN):  aluminum hydroxide/magnesium hydroxide/simethicone Suspension 30 milliLiter(s) Oral four times a day PRN Indigestion  benzocaine 15 mG/menthol 3.6 mG (Sugar-Free) Lozenge 1 Lozenge Oral every 3 hours PRN Sore Throat  magnesium hydroxide Suspension 30 milliLiter(s) Oral daily PRN Constipation  morphine  - Injectable 2 milliGRAM(s) IV Push every 6 hours PRN breakthrough pain  ondansetron Injectable 4 milliGRAM(s) IV Push every 6 hours PRN Nausea and/or Vomiting  traMADol 50 milliGRAM(s) Oral daily PRN 30 minutes prior to Physical Therapy  traMADol 50 milliGRAM(s) Oral every 6 hours PRN Severe Pain (7 - 10)  traMADol 25 milliGRAM(s) Oral every 4 hours PRN Moderate Pain (4 - 6)      Allergies    No Known Allergies    Intolerances        Social History:  Lives w/ wife.  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  Ambulates with cane.  Per wife, "easily confused."  Denies current or past tobacco use.  Infrequent etoh use.  CBD pills for pain. (23 Apr 2020 07:10)      REVIEW OF SYSTEMS:  CONSTITUTIONAL: No fever, No chills, No fatigue, No myalgia, No body ache, No weakness  EYES: No eye pain,  No visual disturbances, No discharge, No Redness.  ENMT:  No ear pain, No nose bleed, No vertigo; No sinus pain, No throat pain, No congestion.  NECK: No pain, No stiffness.  RESPIRATORY: No cough, No wheezing, No  hemoptysis, No shortness of breath.  CARDIOVASCULAR: No chest pain, palpitations.  GASTROINTESTINAL: No abdominal pain, No epigastric pain. No nausea, No vomiting; No diarrhea, No constipation. [  ] BM  GENITOURINARY: No dysuria, No frequency, No urgency, No hematuria, No incontinence  NEUROLOGICAL: No headaches, No dizziness, No numbness, No tingling, No tremors, No weakness  EXT: No Swelling, No pain., No edema.  SKIN:  [  ] No itching, burning, rashes, or lesions   MUSCULOSKELETAL: No joint pain or swelling; No muscle pain, No back pain, No extremity pain.  PSYCHIATRIC: No depression,  No anxiety,  No mood swings, No difficulty sleeping at night.  PAIN SCALE: [  ] None  [  ] Other-  ROS Unable to obtain due to - [  ] Dementia  [  ] Lethargy [  ] Drowsy [  ] Sedated [  ] non verbal  REST OF REVIEW Of SYSTEM - [  ] Normal     Vital Signs Last 24 Hrs  T(C): 36.9 (27 Apr 2020 05:24), Max: 36.9 (27 Apr 2020 05:24)  T(F): 98.5 (27 Apr 2020 05:24), Max: 98.5 (27 Apr 2020 05:24)  HR: 78 (27 Apr 2020 05:24) (73 - 79)  BP: 152/82 (27 Apr 2020 05:24) (138/76 - 152/82)  BP(mean): --  RR: 18 (27 Apr 2020 05:24) (18 - 18)  SpO2: 95% (27 Apr 2020 05:24) (95% - 99%)  Finger Stick        04-26 @ 07:01  -  04-27 @ 07:00  --------------------------------------------------------  IN: 0 mL / OUT: 650 mL / NET: -650 mL          PHYSICAL EXAM:   GENERAL:  [x  ] NAD , [  x] well appearing, [  ] Agitated, [  ] Drowsy,  [  ] Lethargy, [  ] confused   HEAD:  [x  ] Normal, [  ] Other  EYES:  [x  ] EOMI, [x  ] PERRLA, [  ] conjunctiva and sclera clear normal, [  ] Other,  [  ] Pallor,[  ] Discharge  ENMT:  [x ] Normal, [x  ] Dry  mucous membranes, [ x ] Good dentition, [x  ] No Thrush  NECK:  [x  ] Supple, [ x ] No JVD, [ x ] Normal thyroid, [  ] Lymphadenopathy [  ] Other  CHEST/LUNG:  [x  ] Clear to auscultation bilaterally, [ x ] Breath Sounds equal ,  [x  ] No rales, [x  ] No rhonchi  [x  ]  No wheezing  HEART:  [x  ] Regular rate and rhythm, [  ] tachycardia, [  ] Bradycardia,  [  ] irregular  [ x ] No murmurs, No rubs, No gallops, [  ] PPM in place (Mfr:  )  ABDOMEN:  [x  ] Soft, [ x ] Nontender, [ x ] Nondistended, [ x ] No mass, [ x ] Bowel sounds present, [x  ] obese  NERVOUS SYSTEM:  [ x ] Alert & Oriented X 1-,2 [ x ] Nonfocal  [x ] Confusion  [  ] Encephalopathic [  ] Sedated [  ] Unable to assess, [ x ] Other-? Dementia  EXTREMITIES: [ x ] 2+ Peripheral Pulses, No clubbing, No cyanosis, Left Hip dressing, mild swelling [  ] edema B/L lower EXT. [  ] PVD stasis skin changes B/L Lower EXT  LYMPH: No lymphadenopathy noted  SKIN:  [ x ] No rashes or lesions, [  ] Pressure Ulcers, [  ] ecchymosis, [  ] Skin Tears, [  ] Other      DIET: Regular    LABS:                        8.7    10.92 )-----------( 162      ( 26 Apr 2020 09:15 )             28.7     26 Apr 2020 09:15    139    |  104    |  27     ----------------------------<  112    3.8     |  28     |  1.30     Ca    8.7        26 Apr 2020 09:15                    CARDIAC MARKERS ( 23 Apr 2020 06:17 )  x     / x     / 365 U/L / x     / x                 Anemia Panel:  Ferritin, Serum: 57 ng/mL (04-24-20 @ 11:06)  Folate, Serum: 4.6 ng/mL (04-24-20 @ 11:06)  Vitamin B12, Serum: 706 pg/mL (04-24-20 @ 11:06)  Iron Total, Serum: 23 ug/dL (04-24-20 @ 11:05)  Iron - Total Binding Capacity.: 356 ug/dL (04-24-20 @ 11:05)      Thyroid Panel:                RADIOLOGY & ADDITIONAL TESTS:      HEALTH ISSUES - PROBLEM Dx:  D-dimer, elevated: D-dimer, elevated  Anemia: Anemia  Leukocytosis: Leukocytosis  Need for prophylactic measure: Need for prophylactic measure  BPH (benign prostatic hyperplasia): BPH (benign prostatic hyperplasia)  High cholesterol: High cholesterol  CAD (coronary artery disease): CAD (coronary artery disease)  Hypertension: Hypertension  MAYRA (acute kidney injury): MAYRA (acute kidney injury)  Closed fracture of left hip, initial encounter: Closed fracture of left hip, initial encounter          Consultant(s) Notes Reviewed:  [  ] YES     Care Discussed with [X] Consultants  [  ] Patient  [  ] Family [  ] HCP [  ]   [  ] Social Service  [  ] RN, [  ] Physical Therapy,[  ] Palliative care team  DVT PPX: [  ] Lovenox, [  ] S C Heparin, [  ] Coumadin, [  ] Xarelto, [  ] Eliquis, [  ] Pradaxa, [  ] IV Heparin drip, [  ] SCD [  ] Contraindication 2 to GI Bleed,[  ] Ambulation [  ] Contraindicated 2 to  bleed [  ] Contraindicated 2 to Brain Bleed  Advanced directive: [  ] None, [  ] DNR/DNI Patient is a 85y old  Male who presents with a chief complaint of Left hip fracture (subcapital left femoral neck fracture)  Patients cell #: [phone] (27 Apr 2020 07:00)        84y/o M with PMH of HTN, HLD, CAD (s/p open heart surgery 5yrs ago), chronic pain (s/p knee and rt hip replacement), BPH, and mild confusion (per wife) BIBEMS to [facility] ED s/p fall. Per wife, whom patient lives with, patient was ambulating with his cane when he lost balance and fell. Patient was in normal state of health prior to this time. Patient is often is "off balance." Wife was unable to get him off ground so called fire department. Fire department said he was fine, and put him into bed. Wife noticed his left hip looked like it was "sticking out" and leg appeared "out-turned." Wife was going to take patient for x-rays today, however early this morning, patient fell out of bed so she decided to call EMS. Patient w/ difficulty bearing weight. Denies LOC, headache, neck or back pain. Denies CP, palpitations, SOB, cough, n/v/d. Has been eating and drinking well. Patient has been quarantined with his wife in their home for 5 weeks.    Fever or chills: yes [  ]   no [ X]  Fatigue, malaise or generalized weakness: yes [  ]   no [ X ]  Shortness of breath/dyspnea: yes [  ]   no [  x]  Cough: yes [  ]   no [x  ]  Anorexia/po intolerance: yes [  ]   no [ x ]  Chest pain or chest tightness: yes [  ]   no [ x ]  Myalgias: yes [  ]   no [ x ]  Headache: yes [  ]   no [ x ]  Sore throat: yes [  ]   no [ x ]  Rhinorrhea: yes [  ]   no [x  ]  Abd pain: yes [  ]   no [ x ]  Nausea: yes [  ]   no [x  ]  Vomiting: yes [  ]   no [  x]  Diarrhea: yes [  ]   no [ x ]  Loss of sense of smell/anosmia: yes [  ]   no [x  ]  Conjunctivitis: yes [  ]   no [x  ]  Recent travel: yes [  ]   no [ x ]  Any sick contacts: yes [  ]   no [x  ]  Close contact with someone confirmed positive with COVID-19 / SARS-CoV2 in the last 14 days: yes [  ]   no [x  ]    In ED,  Vitals: T 98.9F, HR 83, /82, RR 16, SpO2 4L NC  Labs significant for: WBC 13.57, N/L ratio 15.9, H/H 9.4/30.1, BUN 46, Cr 1.8  EKG: NSR at 78bpm  CT head non cont:  no acute intracranial hemorrhage or mass effect.  CT cervical spine non cont: no evidence of cervical spine fracture.  CXR: no acute pulmonary process  Left hip xray: subcapital left femoral neck fracture, marked external rotation of hip, age indeterminant fracture deformity involving the left ischiopubic ramus  COVID PCR obtained while in ED.    INTERVAL HPI:  4/23/20: Pt seen and examined. Planned on OR today. cardio clearance obtained. Patient kept NPO pending procedure. COVID 19 -NEG, Pt unable to urinate. chronic anemia  4/24/20: Pt seen and examined. S/p Hemiarthroplasty POD #1. Seen by Heme/onc for anemia, Low H/H  likely multifactorial. start on IV Venofer x 3 doses. Mild leukocytosis.  4/25/20: Pt seen and examined. S/p Hemiarthroplasty POD #2. H/H low today requiring 1x PRBC transfusion and started on venofer x3 days by heme.   4/26/20: Pt seen and examined. S/p Hemiarthroplasty POD #3. S/p 1u PRBC transfusion yesterday, on venofer. H/H stable today. Switched to PO xarelto for dvt ppx postop. d/c planning  4/27/20: pt seen, examined, POD # 4 S/P Hemiarthroplasty, H/H stable, D/C Planning.    OVERNIGHT EVENTS: none    Home Medications:  acetaminophen 325 mg oral tablet: 2 tab(s) orally every 6 hours, As Needed (26 Apr 2020 12:19)  Amitiza 24 mcg oral capsule: 1 cap(s) orally 2 times a day (23 Apr 2020 11:40)  atorvastatin 10 mg oral tablet: 1 tab(s) orally once a day (23 Apr 2020 11:40)  escitalopram 20 mg oral tablet: 1 tab(s) orally once a day (23 Apr 2020 11:40)  finasteride 5 mg oral tablet: 1 tab(s) orally once a day (23 Apr 2020 11:40)  Metoprolol Succinate ER 50 mg oral tablet, extended release: 1 tab(s) orally once a day (23 Apr 2020 11:40)  traMADol 150 mg/24 hours oral capsule, extended release: 1 cap(s) orally once a day (23 Apr 2020 11:40)      MEDICATIONS  (STANDING):  ascorbic acid 500 milliGRAM(s) Oral two times a day  atorvastatin 10 milliGRAM(s) Oral at bedtime  escitalopram 20 milliGRAM(s) Oral daily  finasteride 5 milliGRAM(s) Oral daily  folic acid 1 milliGRAM(s) Oral daily  melatonin 3 milliGRAM(s) Oral at bedtime  metoprolol succinate ER 50 milliGRAM(s) Oral daily  multivitamin 1 Tablet(s) Oral daily  polyethylene glycol 3350 17 Gram(s) Oral daily  rivaroxaban 10 milliGRAM(s) Oral daily  senna 2 Tablet(s) Oral at bedtime    MEDICATIONS  (PRN):  aluminum hydroxide/magnesium hydroxide/simethicone Suspension 30 milliLiter(s) Oral four times a day PRN Indigestion  benzocaine 15 mG/menthol 3.6 mG (Sugar-Free) Lozenge 1 Lozenge Oral every 3 hours PRN Sore Throat  magnesium hydroxide Suspension 30 milliLiter(s) Oral daily PRN Constipation  morphine  - Injectable 2 milliGRAM(s) IV Push every 6 hours PRN breakthrough pain  ondansetron Injectable 4 milliGRAM(s) IV Push every 6 hours PRN Nausea and/or Vomiting  traMADol 50 milliGRAM(s) Oral daily PRN 30 minutes prior to Physical Therapy  traMADol 50 milliGRAM(s) Oral every 6 hours PRN Severe Pain (7 - 10)  traMADol 25 milliGRAM(s) Oral every 4 hours PRN Moderate Pain (4 - 6)      Allergies    No Known Allergies    Intolerances        Social History:  Lives w/ wife.  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  Per wife, "easily confused."  Denies current or past tobacco use.  Infrequent etoh use.  CBD pills for pain. (23 Apr 2020 07:10)      REVIEW OF SYSTEMS: i feel OK today  CONSTITUTIONAL: No fever, No chills, No fatigue, No myalgia, No body ache, No weakness  EYES: No eye pain,  No visual disturbances, No discharge, No Redness.  ENMT:  No ear pain, No nose bleed, No vertigo; No sinus pain, No throat pain, No congestion.  NECK: No pain, No stiffness.  RESPIRATORY: No cough, No wheezing, No  hemoptysis, No shortness of breath.  CARDIOVASCULAR: No chest pain, palpitations.  GASTROINTESTINAL: No abdominal pain, No epigastric pain. No nausea, No vomiting; No diarrhea, No constipation. [ x ] BM  GENITOURINARY: No dysuria, No frequency, No urgency, No hematuria, No incontinence  NEUROLOGICAL: No headaches, No dizziness, No numbness, No tingling, No tremors, No weakness  EXT: No Swelling, No pain., No edema.  SKIN:  [x  ] No itching, burning, rashes, or lesions   MUSCULOSKELETAL: No joint pain or swelling; No muscle pain, No back pain, No extremity pain.  PSYCHIATRIC: No depression,  No anxiety,  No mood swings, No difficulty sleeping at night.  PAIN SCALE: [  x] None  [  ] Other-  ROS Unable to obtain due to - [  ] Dementia  [  ] Lethargy [  ] Drowsy [  ] Sedated [  ] non verbal  REST OF REVIEW Of SYSTEM - [x  ] Normal     Vital Signs Last 24 Hrs  T(C): 36.9 (27 Apr 2020 05:24), Max: 36.9 (27 Apr 2020 05:24)  T(F): 98.5 (27 Apr 2020 05:24), Max: 98.5 (27 Apr 2020 05:24)  HR: 78 (27 Apr 2020 05:24) (73 - 79)  BP: 152/82 (27 Apr 2020 05:24) (138/76 - 152/82)  BP(mean): --  RR: 18 (27 Apr 2020 05:24) (18 - 18)  SpO2: 95% (27 Apr 2020 05:24) (95% - 99%)  Finger Stick        04-26 @ 07:01  -  04-27 @ 07:00  --------------------------------------------------------  IN: 0 mL / OUT: 650 mL / NET: -650 mL      PHYSICAL EXAM: Pt is forgetful  GENERAL:  [x  ] NAD , [  x] well appearing, [  ] Agitated, [  ] Drowsy,  [  ] Lethargy, [  ] confused   HEAD:  [x  ] Normal, [  ] Other  EYES:  [x  ] EOMI, [x  ] PERRLA, [  ] conjunctiva and sclera clear normal, [  ] Other,  [  ] Pallor,[  ] Discharge  ENMT:  [x ] Normal, [x  ] Dry  mucous membranes, [ x ] Good dentition, [x  ] No Thrush  NECK:  [x  ] Supple, [ x ] No JVD, [ x ] Normal thyroid, [  ] Lymphadenopathy [  ] Other  CHEST/LUNG:  [x  ] Clear to auscultation bilaterally, [ x ] Breath Sounds equal ,  [x  ] No rales, [x  ] No rhonchi  [x  ]  No wheezing  HEART:  [x  ] Regular rate and rhythm, [  ] tachycardia, [  ] Bradycardia,  [  ] irregular  [ x ] No murmurs, No rubs, No gallops, [  ] PPM in place (Mfr:  )  ABDOMEN:  [x  ] Soft, [ x ] Nontender, [ x ] Nondistended, [ x ] No mass, [ x ] Bowel sounds present, [x  ] obese  NERVOUS SYSTEM:  [ x ] Alert & Oriented X 1-,2 [ x ] Nonfocal  [x ] Confusion  [  ] Encephalopathic [  ] Sedated [  ] Unable to assess, [ x ] Other-? Dementia  EXTREMITIES: [ x ] 2+ Peripheral Pulses, No clubbing, No cyanosis, Left Hip dressing, mild swelling [  ] edema B/L lower EXT. [  ] PVD stasis skin changes B/L Lower EXT  LYMPH: No lymphadenopathy noted  SKIN:  [ x ] No rashes or lesions, [  ] Pressure Ulcers, [  ] ecchymosis, [  ] Skin Tears, [  ] Other      DIET: Regular    LABS:                          9.3    9.75  )-----------( 207      ( 27 Apr 2020 08:58 )             31.1       27 Apr 2020 08:58    140    |  105    |  26     ----------------------------<  112    3.5     |  28     |  1.20     Ca    8.9        27 Apr 2020 08:58                        8.7    10.92 )-----------( 162      ( 26 Apr 2020 09:15 )             28.7     26 Apr 2020 09:15    139    |  104    |  27     ----------------------------<  112    3.8     |  28     |  1.30     Ca    8.7        26 Apr 2020 09:15      CARDIAC MARKERS ( 23 Apr 2020 06:17 )  x     / x     / 365 U/L / x     / x                 Anemia Panel:  Ferritin, Serum: 57 ng/mL (04-24-20 @ 11:06)  Folate, Serum: 4.6 ng/mL (04-24-20 @ 11:06)  Vitamin B12, Serum: 706 pg/mL (04-24-20 @ 11:06)  Iron Total, Serum: 23 ug/dL (04-24-20 @ 11:05)  Iron - Total Binding Capacity.: 356 ug/dL (04-24-20 @ 11:05)      Thyroid Panel:    RADIOLOGY & ADDITIONAL TESTS:NONE      HEALTH ISSUES - PROBLEM Dx:  D-dimer, elevated: D-dimer, elevated  Anemia: Anemia  Leukocytosis: Leukocytosis  Need for prophylactic measure: Need for prophylactic measure  BPH (benign prostatic hyperplasia): BPH (benign prostatic hyperplasia)  High cholesterol: High cholesterol  CAD (coronary artery disease): CAD (coronary artery disease)  Hypertension: Hypertension  MAYRA (acute kidney injury): MAYRA (acute kidney injury)  Closed fracture of left hip, initial encounter: Closed fracture of left hip, initial encounter          Consultant(s) Notes Reviewed:  [ x ] YES     Care Discussed with [X] Consultants  [x  ] Patient  [x  ] Family [  ] HCP [x  ]   [  ] Social Service  [ x ] RN, [  ] Physical Therapy,[  ] Palliative care team  DVT PPX: [  ] Lovenox, [ x ] S C Heparin, [  ] Coumadin, [  ] Xarelto, [  ] Eliquis, [  ] Pradaxa, [  ] IV Heparin drip, [  ] SCD [  ] Contraindication 2 to GI Bleed,[  ] Ambulation [  ] Contraindicated 2 to  bleed [  ] Contraindicated 2 to Brain Bleed  Advanced directive: [ x ] None, [  ] DNR/DNI

## 2020-04-28 VITALS
TEMPERATURE: 99 F | SYSTOLIC BLOOD PRESSURE: 140 MMHG | OXYGEN SATURATION: 96 % | RESPIRATION RATE: 19 BRPM | HEART RATE: 91 BPM | DIASTOLIC BLOOD PRESSURE: 68 MMHG

## 2020-04-28 LAB
ANION GAP SERPL CALC-SCNC: 7 MMOL/L — SIGNIFICANT CHANGE UP (ref 5–17)
BUN SERPL-MCNC: 21 MG/DL — SIGNIFICANT CHANGE UP (ref 7–23)
CALCIUM SERPL-MCNC: 8.6 MG/DL — SIGNIFICANT CHANGE UP (ref 8.5–10.1)
CHLORIDE SERPL-SCNC: 103 MMOL/L — SIGNIFICANT CHANGE UP (ref 96–108)
CO2 SERPL-SCNC: 29 MMOL/L — SIGNIFICANT CHANGE UP (ref 22–31)
CREAT SERPL-MCNC: 1.1 MG/DL — SIGNIFICANT CHANGE UP (ref 0.5–1.3)
GLUCOSE SERPL-MCNC: 99 MG/DL — SIGNIFICANT CHANGE UP (ref 70–99)
HCT VFR BLD CALC: 27.1 % — LOW (ref 39–50)
HGB BLD-MCNC: 8.2 G/DL — LOW (ref 13–17)
MCHC RBC-ENTMCNC: 27.9 PG — SIGNIFICANT CHANGE UP (ref 27–34)
MCHC RBC-ENTMCNC: 30.3 GM/DL — LOW (ref 32–36)
MCV RBC AUTO: 92.2 FL — SIGNIFICANT CHANGE UP (ref 80–100)
NRBC # BLD: 0 /100 WBCS — SIGNIFICANT CHANGE UP (ref 0–0)
PLATELET # BLD AUTO: 192 K/UL — SIGNIFICANT CHANGE UP (ref 150–400)
POTASSIUM SERPL-MCNC: 4.3 MMOL/L — SIGNIFICANT CHANGE UP (ref 3.5–5.3)
POTASSIUM SERPL-SCNC: 4.3 MMOL/L — SIGNIFICANT CHANGE UP (ref 3.5–5.3)
RBC # BLD: 2.94 M/UL — LOW (ref 4.2–5.8)
RBC # FLD: 14 % — SIGNIFICANT CHANGE UP (ref 10.3–14.5)
SODIUM SERPL-SCNC: 139 MMOL/L — SIGNIFICANT CHANGE UP (ref 135–145)
WBC # BLD: 8.73 K/UL — SIGNIFICANT CHANGE UP (ref 3.8–10.5)
WBC # FLD AUTO: 8.73 K/UL — SIGNIFICANT CHANGE UP (ref 3.8–10.5)

## 2020-04-28 PROCEDURE — 93005 ELECTROCARDIOGRAM TRACING: CPT

## 2020-04-28 PROCEDURE — 71045 X-RAY EXAM CHEST 1 VIEW: CPT

## 2020-04-28 PROCEDURE — 85014 HEMATOCRIT: CPT

## 2020-04-28 PROCEDURE — 82746 ASSAY OF FOLIC ACID SERUM: CPT

## 2020-04-28 PROCEDURE — 73552 X-RAY EXAM OF FEMUR 2/>: CPT

## 2020-04-28 PROCEDURE — 97162 PT EVAL MOD COMPLEX 30 MIN: CPT

## 2020-04-28 PROCEDURE — 73502 X-RAY EXAM HIP UNI 2-3 VIEWS: CPT

## 2020-04-28 PROCEDURE — 80048 BASIC METABOLIC PNL TOTAL CA: CPT

## 2020-04-28 PROCEDURE — 99285 EMERGENCY DEPT VISIT HI MDM: CPT

## 2020-04-28 PROCEDURE — 73501 X-RAY EXAM HIP UNI 1 VIEW: CPT

## 2020-04-28 PROCEDURE — 72125 CT NECK SPINE W/O DYE: CPT

## 2020-04-28 PROCEDURE — 82607 VITAMIN B-12: CPT

## 2020-04-28 PROCEDURE — 85610 PROTHROMBIN TIME: CPT

## 2020-04-28 PROCEDURE — 85027 COMPLETE CBC AUTOMATED: CPT

## 2020-04-28 PROCEDURE — 36430 TRANSFUSION BLD/BLD COMPNT: CPT

## 2020-04-28 PROCEDURE — 84436 ASSAY OF TOTAL THYROXINE: CPT

## 2020-04-28 PROCEDURE — 97165 OT EVAL LOW COMPLEX 30 MIN: CPT

## 2020-04-28 PROCEDURE — 86900 BLOOD TYPING SEROLOGIC ABO: CPT

## 2020-04-28 PROCEDURE — 88305 TISSUE EXAM BY PATHOLOGIST: CPT

## 2020-04-28 PROCEDURE — 80053 COMPREHEN METABOLIC PANEL: CPT

## 2020-04-28 PROCEDURE — 84443 ASSAY THYROID STIM HORMONE: CPT

## 2020-04-28 PROCEDURE — 70450 CT HEAD/BRAIN W/O DYE: CPT

## 2020-04-28 PROCEDURE — 73503 X-RAY EXAM HIP UNI 4/> VIEWS: CPT

## 2020-04-28 PROCEDURE — 85018 HEMOGLOBIN: CPT

## 2020-04-28 PROCEDURE — 84480 ASSAY TRIIODOTHYRONINE (T3): CPT

## 2020-04-28 PROCEDURE — P9016: CPT

## 2020-04-28 PROCEDURE — 97116 GAIT TRAINING THERAPY: CPT

## 2020-04-28 PROCEDURE — 97530 THERAPEUTIC ACTIVITIES: CPT

## 2020-04-28 PROCEDURE — 86923 COMPATIBILITY TEST ELECTRIC: CPT

## 2020-04-28 PROCEDURE — 82550 ASSAY OF CK (CPK): CPT

## 2020-04-28 PROCEDURE — 87635 SARS-COV-2 COVID-19 AMP PRB: CPT

## 2020-04-28 PROCEDURE — 86901 BLOOD TYPING SEROLOGIC RH(D): CPT

## 2020-04-28 PROCEDURE — 83540 ASSAY OF IRON: CPT

## 2020-04-28 PROCEDURE — 85730 THROMBOPLASTIN TIME PARTIAL: CPT

## 2020-04-28 PROCEDURE — 82728 ASSAY OF FERRITIN: CPT

## 2020-04-28 PROCEDURE — 36415 COLL VENOUS BLD VENIPUNCTURE: CPT

## 2020-04-28 PROCEDURE — 86850 RBC ANTIBODY SCREEN: CPT

## 2020-04-28 PROCEDURE — 83735 ASSAY OF MAGNESIUM: CPT

## 2020-04-28 PROCEDURE — 84466 ASSAY OF TRANSFERRIN: CPT

## 2020-04-28 PROCEDURE — 88311 DECALCIFY TISSUE: CPT

## 2020-04-28 PROCEDURE — 84145 PROCALCITONIN (PCT): CPT

## 2020-04-28 PROCEDURE — 97110 THERAPEUTIC EXERCISES: CPT

## 2020-04-28 PROCEDURE — 81001 URINALYSIS AUTO W/SCOPE: CPT

## 2020-04-28 PROCEDURE — 84100 ASSAY OF PHOSPHORUS: CPT

## 2020-04-28 PROCEDURE — 83550 IRON BINDING TEST: CPT

## 2020-04-28 PROCEDURE — C1776: CPT

## 2020-04-28 PROCEDURE — 99232 SBSQ HOSP IP/OBS MODERATE 35: CPT

## 2020-04-28 PROCEDURE — 85379 FIBRIN DEGRADATION QUANT: CPT

## 2020-04-28 PROCEDURE — 97535 SELF CARE MNGMENT TRAINING: CPT

## 2020-04-28 RX ORDER — TAMSULOSIN HYDROCHLORIDE 0.4 MG/1
1 CAPSULE ORAL
Qty: 30 | Refills: 0
Start: 2020-04-28 | End: 2020-05-27

## 2020-04-28 RX ORDER — FOLIC ACID 0.8 MG
1 TABLET ORAL
Qty: 0 | Refills: 0 | DISCHARGE
Start: 2020-04-28

## 2020-04-28 RX ORDER — ASCORBIC ACID 60 MG
1 TABLET,CHEWABLE ORAL
Qty: 0 | Refills: 0 | DISCHARGE
Start: 2020-04-28

## 2020-04-28 RX ORDER — SENNA PLUS 8.6 MG/1
2 TABLET ORAL
Qty: 60 | Refills: 0
Start: 2020-04-28 | End: 2020-05-27

## 2020-04-28 RX ORDER — POLYETHYLENE GLYCOL 3350 17 G/17G
17 POWDER, FOR SOLUTION ORAL
Qty: 0 | Refills: 0 | DISCHARGE
Start: 2020-04-28

## 2020-04-28 RX ORDER — TAMSULOSIN HYDROCHLORIDE 0.4 MG/1
0.4 CAPSULE ORAL AT BEDTIME
Refills: 0 | Status: DISCONTINUED | OUTPATIENT
Start: 2020-04-28 | End: 2020-04-28

## 2020-04-28 RX ORDER — FOLIC ACID 0.8 MG
1 TABLET ORAL
Qty: 30 | Refills: 0
Start: 2020-04-28 | End: 2020-05-27

## 2020-04-28 RX ADMIN — Medication 50 MILLIGRAM(S): at 04:59

## 2020-04-28 RX ADMIN — Medication 500 MILLIGRAM(S): at 04:59

## 2020-04-28 RX ADMIN — Medication 500 MILLIGRAM(S): at 11:19

## 2020-04-28 RX ADMIN — RIVAROXABAN 10 MILLIGRAM(S): KIT at 11:18

## 2020-04-28 RX ADMIN — ESCITALOPRAM OXALATE 20 MILLIGRAM(S): 10 TABLET, FILM COATED ORAL at 11:18

## 2020-04-28 RX ADMIN — Medication 1 MILLIGRAM(S): at 11:18

## 2020-04-28 RX ADMIN — TRAMADOL HYDROCHLORIDE 50 MILLIGRAM(S): 50 TABLET ORAL at 05:01

## 2020-04-28 RX ADMIN — TRAMADOL HYDROCHLORIDE 50 MILLIGRAM(S): 50 TABLET ORAL at 17:49

## 2020-04-28 RX ADMIN — FINASTERIDE 5 MILLIGRAM(S): 5 TABLET, FILM COATED ORAL at 11:19

## 2020-04-28 RX ADMIN — POLYETHYLENE GLYCOL 3350 17 GRAM(S): 17 POWDER, FOR SOLUTION ORAL at 11:18

## 2020-04-28 RX ADMIN — MORPHINE SULFATE 2 MILLIGRAM(S): 50 CAPSULE, EXTENDED RELEASE ORAL at 11:24

## 2020-04-28 RX ADMIN — Medication 1 TABLET(S): at 11:18

## 2020-04-28 NOTE — PROGRESS NOTE ADULT - PROBLEM SELECTOR PLAN 3
Resolved.  leukocytosis likely reactive in setting of acute fracture  - Doubt infectious etiology as patient w/o viral symptoms or fever, hemodynamically stable at this time  - COVID negative

## 2020-04-28 NOTE — PROGRESS NOTE ADULT - PROBLEM SELECTOR PLAN 2
-H/H 9.4/30.1 on admission, baseline unknown  -H/H decreased to 7.4/23.7 on 4/25 requiring 1u PRBC and started on venofer  -anemia likely multifactorial acute injury s/p surgery, underlying ckd, and chronic disease.   -H/H 8.2/27.1 on 4/28.  - No signs or symptoms of acute bleed, most likely chronic  - H/H currently stable  -Pt told to follow up with hematologist as an outpatient and continue folic acid 1 mg PO daily and multivitamin PO daily.

## 2020-04-28 NOTE — PROGRESS NOTE ADULT - SUBJECTIVE AND OBJECTIVE BOX
Patient is a 85y old  Male who presents with a chief complaint of Left hip fracture (subcapital left femoral neck fracture)  Patients cell #: [phone] (27 Apr 2020 07:00)        86y/o M with PMH of HTN, HLD, CAD (s/p open heart surgery 5yrs ago), chronic pain (s/p knee and rt hip replacement), BPH, and mild confusion (per wife) BIBEMS to [facility] ED s/p fall. Per wife, whom patient lives with, patient was ambulating with his cane when he lost balance and fell. Patient was in normal state of health prior to this time. Patient is often is "off balance." Wife was unable to get him off ground so called fire department. Fire department said he was fine, and put him into bed. Wife noticed his left hip looked like it was "sticking out" and leg appeared "out-turned." Wife was going to take patient for x-rays today, however early this morning, patient fell out of bed so she decided to call EMS. Patient w/ difficulty bearing weight. Denies LOC, headache, neck or back pain. Denies CP, palpitations, SOB, cough, n/v/d. Has been eating and drinking well. Patient has been quarantined with his wife in their home for 5 weeks.    Fever or chills: yes [  ]   no [ X]  Fatigue, malaise or generalized weakness: yes [  ]   no [ X ]  Shortness of breath/dyspnea: yes [  ]   no [  x]  Cough: yes [  ]   no [x  ]  Anorexia/po intolerance: yes [  ]   no [ x ]  Chest pain or chest tightness: yes [  ]   no [ x ]  Myalgias: yes [  ]   no [ x ]  Headache: yes [  ]   no [ x ]  Sore throat: yes [  ]   no [ x ]  Rhinorrhea: yes [  ]   no [x  ]  Abd pain: yes [  ]   no [ x ]  Nausea: yes [  ]   no [x  ]  Vomiting: yes [  ]   no [  x]  Diarrhea: yes [  ]   no [ x ]  Loss of sense of smell/anosmia: yes [  ]   no [x  ]  Conjunctivitis: yes [  ]   no [x  ]  Recent travel: yes [  ]   no [ x ]  Any sick contacts: yes [  ]   no [x  ]  Close contact with someone confirmed positive with COVID-19 / SARS-CoV2 in the last 14 days: yes [  ]   no [x  ]    In ED,  Vitals: T 98.9F, HR 83, /82, RR 16, SpO2 4L NC  Labs significant for: WBC 13.57, N/L ratio 15.9, H/H 9.4/30.1, BUN 46, Cr 1.8  EKG: NSR at 78bpm  CT head non cont:  no acute intracranial hemorrhage or mass effect.  CT cervical spine non cont: no evidence of cervical spine fracture.  CXR: no acute pulmonary process  Left hip xray: subcapital left femoral neck fracture, marked external rotation of hip, age indeterminant fracture deformity involving the left ischiopubic ramus  COVID PCR obtained while in ED.    INTERVAL HPI:  4/23/20: Pt seen and examined. Planned on OR today. cardio clearance obtained. Patient kept NPO pending procedure. COVID 19 -NEG, Pt unable to urinate. chronic anemia  4/24/20: Pt seen and examined. S/p Hemiarthroplasty POD #1. Seen by Heme/onc for anemia, Low H/H  likely multifactorial. start on IV Venofer x 3 doses. Mild leukocytosis.  4/25/20: Pt seen and examined. S/p Hemiarthroplasty POD #2. H/H low today requiring 1x PRBC transfusion and started on venofer x3 days by heme.   4/26/20: Pt seen and examined. S/p Hemiarthroplasty POD #3. S/p 1u PRBC transfusion yesterday, on venofer. H/H stable today. Switched to PO xarelto for dvt ppx postop. d/c planning  4/27/20: pt seen, examined, POD # 4 S/P Hemiarthroplasty, H/H stable, D/C Planning.  4/28/2020: Patient seen an examined at bedside. Patient stable for D/C with home PT. Patient was having difficulty urinating overnight and bladder scan was performed, revealing 374 ccs. Straight cath performed with 275 ccs urine. Enema performed as patient tends to have difficulty urinating with constipation. Patient started on Flomax 0.4 mg PO daily. Pt will be discharge home with Flomax 0.4 mg PO daily, Xarelto (for hip Fx), folic acid 1 mg PO daily and was told to take multivitamin daily and folate 1 mg PO daily.    OVERNIGHT EVENTS: none    Home Medications:  acetaminophen 325 mg oral tablet: 2 tab(s) orally every 6 hours, As Needed (26 Apr 2020 12:19)  Amitiza 24 mcg oral capsule: 1 cap(s) orally 2 times a day (23 Apr 2020 11:40)  atorvastatin 10 mg oral tablet: 1 tab(s) orally once a day (23 Apr 2020 11:40)  escitalopram 20 mg oral tablet: 1 tab(s) orally once a day (23 Apr 2020 11:40)  finasteride 5 mg oral tablet: 1 tab(s) orally once a day (23 Apr 2020 11:40)  Metoprolol Succinate ER 50 mg oral tablet, extended release: 1 tab(s) orally once a day (23 Apr 2020 11:40)  traMADol 150 mg/24 hours oral capsule, extended release: 1 cap(s) orally once a day (23 Apr 2020 11:40)      MEDICATIONS  (STANDING):  ascorbic acid 500 milliGRAM(s) Oral two times a day  atorvastatin 10 milliGRAM(s) Oral at bedtime  escitalopram 20 milliGRAM(s) Oral daily  finasteride 5 milliGRAM(s) Oral daily  folic acid 1 milliGRAM(s) Oral daily  melatonin 3 milliGRAM(s) Oral at bedtime  metoprolol succinate ER 50 milliGRAM(s) Oral daily  multivitamin 1 Tablet(s) Oral daily  polyethylene glycol 3350 17 Gram(s) Oral daily  rivaroxaban 10 milliGRAM(s) Oral daily  senna 2 Tablet(s) Oral at bedtime  tamsulosin 0.4 milliGRAM(s) Oral at bedtime    MEDICATIONS  (PRN):  aluminum hydroxide/magnesium hydroxide/simethicone Suspension 30 milliLiter(s) Oral four times a day PRN Indigestion  benzocaine 15 mG/menthol 3.6 mG (Sugar-Free) Lozenge 1 Lozenge Oral every 3 hours PRN Sore Throat  magnesium hydroxide Suspension 30 milliLiter(s) Oral daily PRN Constipation  morphine  - Injectable 2 milliGRAM(s) IV Push every 6 hours PRN breakthrough pain  ondansetron Injectable 4 milliGRAM(s) IV Push every 6 hours PRN Nausea and/or Vomiting  traMADol 50 milliGRAM(s) Oral daily PRN 30 minutes prior to Physical Therapy  traMADol 50 milliGRAM(s) Oral every 6 hours PRN Severe Pain (7 - 10)  traMADol 25 milliGRAM(s) Oral every 4 hours PRN Moderate Pain (4 - 6)        Allergies  No Known Allergies    Intolerances        Social History:  Lives w/ wife.  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  Per wife, "easily confused."  Denies current or past tobacco use.  Infrequent etoh use.  CBD pills for pain. (23 Apr 2020 07:10)      REVIEW OF SYSTEMS: i feel OK today  CONSTITUTIONAL: No fever, No chills, No fatigue, No myalgia, No body ache, No weakness  EYES: No eye pain,  No visual disturbances, No discharge, No Redness.  ENMT:  No ear pain, No nose bleed, No vertigo; No sinus pain, No throat pain, No congestion.  NECK: No pain, No stiffness.  RESPIRATORY: No cough, No wheezing, No  hemoptysis, No shortness of breath.  CARDIOVASCULAR: No chest pain, palpitations.  GASTROINTESTINAL: No abdominal pain, No epigastric pain. No nausea, No vomiting; No diarrhea, No constipation. [ x ] BM  GENITOURINARY: No dysuria, No frequency, No urgency, No hematuria, No incontinence  NEUROLOGICAL: No headaches, No dizziness, No numbness, No tingling, No tremors, No weakness  EXT: No Swelling, No pain., No edema.  SKIN:  [x  ] No itching, burning, rashes, or lesions   MUSCULOSKELETAL: No joint pain or swelling; No muscle pain, No back pain, No extremity pain.  PSYCHIATRIC: No depression,  No anxiety,  No mood swings, No difficulty sleeping at night.  PAIN SCALE: [  x] None  [  ] Other-  ROS Unable to obtain due to - [  ] Dementia  [  ] Lethargy [  ] Drowsy [  ] Sedated [  ] non verbal  REST OF REVIEW Of SYSTEM - [x  ] Normal     Vital Signs Last 24 Hrs  T(C): 36.6 (28 Apr 2020 04:53), Max: 36.8 (27 Apr 2020 20:51)  T(F): 97.9 (28 Apr 2020 04:53), Max: 98.2 (27 Apr 2020 20:51)  HR: 89 (28 Apr 2020 04:53) (73 - 89)  BP: 158/90 (28 Apr 2020 04:53) (138/81 - 158/90)  BP(mean): --  RR: 18 (28 Apr 2020 04:53) (17 - 18)  SpO2: 93% (28 Apr 2020 04:53) (93% - 98%)      I&O's Summary    27 Apr 2020 07:01  -  28 Apr 2020 07:00  --------------------------------------------------------  IN: 0 mL / OUT: 525 mL / NET: -525 mL      PHYSICAL EXAM: Pt is forgetful  GENERAL:  [x  ] NAD , [  x] well appearing, [  ] Agitated, [  ] Drowsy,  [  ] Lethargy, [  ] confused   HEAD:  [x  ] Normal, [  ] Other  EYES:  [x  ] EOMI, [x  ] PERRLA, [  ] conjunctiva and sclera clear normal, [  ] Other,  [  ] Pallor,[  ] Discharge  ENMT:  [x ] Normal, [x  ] Dry  mucous membranes, [ x ] Good dentition, [x  ] No Thrush  NECK:  [x  ] Supple, [ x ] No JVD, [ x ] Normal thyroid, [  ] Lymphadenopathy [  ] Other  CHEST/LUNG:  [x  ] Clear to auscultation bilaterally, [ x ] Breath Sounds equal ,  [x  ] No rales, [x  ] No rhonchi  [x  ]  No wheezing  HEART:  [x  ] Regular rate and rhythm, [  ] tachycardia, [  ] Bradycardia,  [  ] irregular  [ x ] No murmurs, No rubs, No gallops, [  ] PPM in place (Mfr:  )  ABDOMEN:  [x  ] Soft, [ x ] Nontender, [ x ] Nondistended, [ x ] No mass, [ x ] Bowel sounds present, [x  ] obese  NERVOUS SYSTEM:  [ x ] Alert & Oriented X 1-,2 [ x ] Nonfocal  [x ] Confusion  [  ] Encephalopathic [  ] Sedated [  ] Unable to assess, [ x ] Other-? Dementia  EXTREMITIES: [ x ] 2+ Peripheral Pulses, No clubbing, No cyanosis, Left Hip dressing, mild swelling [  ] edema B/L lower EXT. [  ] PVD stasis skin changes B/L Lower EXT  LYMPH: No lymphadenopathy noted  SKIN:  [ x ] No rashes or lesions, [  ] Pressure Ulcers, [  ] ecchymosis, [  ] Skin Tears, [  ] Other      DIET: Regular      LABS:                        8.2    8.73  )-----------( 192      ( 28 Apr 2020 08:38 )             27.1     04-28    139  |  103  |  21  ----------------------------<  99  4.3   |  29  |  1.10    Ca    8.6      28 Apr 2020 08:38      CARDIAC MARKERS ( 23 Apr 2020 06:17 )  x     / x     / 365 U/L / x     / x            Anemia Panel:  Ferritin, Serum: 57 ng/mL (04-24-20 @ 11:06)  Folate, Serum: 4.6 ng/mL (04-24-20 @ 11:06)  Vitamin B12, Serum: 706 pg/mL (04-24-20 @ 11:06)  Iron Total, Serum: 23 ug/dL (04-24-20 @ 11:05)  Iron - Total Binding Capacity.: 356 ug/dL (04-24-20 @ 11:05)      Thyroid Panel:    RADIOLOGY & ADDITIONAL TESTS:NONE      HEALTH ISSUES - PROBLEM Dx:  D-dimer, elevated: D-dimer, elevated  Anemia: Anemia  Leukocytosis: Leukocytosis  Need for prophylactic measure: Need for prophylactic measure  BPH (benign prostatic hyperplasia): BPH (benign prostatic hyperplasia)  High cholesterol: High cholesterol  CAD (coronary artery disease): CAD (coronary artery disease)  Hypertension: Hypertension  MAYRA (acute kidney injury): MAYRA (acute kidney injury)  Closed fracture of left hip, initial encounter: Closed fracture of left hip, initial encounter          Consultant(s) Notes Reviewed:  [ x ] YES     Care Discussed with [X] Consultants  [x  ] Patient  [x  ] Family [  ] HCP [x  ]   [  ] Social Service  [ x ] RN, [  ] Physical Therapy,[  ] Palliative care team  DVT PPX: [  ] Lovenox, [ x ] S C Heparin, [  ] Coumadin, [ X ] Xarelto, [  ] Eliquis, [  ] Pradaxa, [  ] IV Heparin drip, [  ] SCD [  ] Contraindication 2 to GI Bleed,[  ] Ambulation [  ] Contraindicated 2 to  bleed [  ] Contraindicated 2 to Brain Bleed  Advanced directive: [ x ] None, [  ] DNR/DNI Patient is a 85y old  Male who presents with a chief complaint of Left hip fracture (subcapital left femoral neck fracture)  Patients cell #: [phone] (27 Apr 2020 07:00)        86y/o M with PMH of HTN, HLD, CAD (s/p open heart surgery 5yrs ago), chronic pain (s/p knee and rt hip replacement), BPH, and mild confusion (per wife) BIBEMS to [facility] ED s/p fall. Per wife, whom patient lives with, patient was ambulating with his cane when he lost balance and fell. Patient was in normal state of health prior to this time. Patient is often is "off balance." Wife was unable to get him off ground so called fire department. Fire department said he was fine, and put him into bed. Wife noticed his left hip looked like it was "sticking out" and leg appeared "out-turned." Wife was going to take patient for x-rays today, however early this morning, patient fell out of bed so she decided to call EMS. Patient w/ difficulty bearing weight. Denies LOC, headache, neck or back pain. Denies CP, palpitations, SOB, cough, n/v/d. Has been eating and drinking well. Patient has been quarantined with his wife in their home for 5 weeks.    Fever or chills: yes [  ]   no [ X]  Fatigue, malaise or generalized weakness: yes [  ]   no [ X ]  Shortness of breath/dyspnea: yes [  ]   no [  x]  Cough: yes [  ]   no [x  ]  Anorexia/po intolerance: yes [  ]   no [ x ]  Chest pain or chest tightness: yes [  ]   no [ x ]  Myalgias: yes [  ]   no [ x ]  Headache: yes [  ]   no [ x ]  Sore throat: yes [  ]   no [ x ]  Rhinorrhea: yes [  ]   no [x  ]  Abd pain: yes [  ]   no [ x ]  Nausea: yes [  ]   no [x  ]  Vomiting: yes [  ]   no [  x]  Diarrhea: yes [  ]   no [ x ]  Loss of sense of smell/anosmia: yes [  ]   no [x  ]  Conjunctivitis: yes [  ]   no [x  ]  Recent travel: yes [  ]   no [ x ]  Any sick contacts: yes [  ]   no [x  ]  Close contact with someone confirmed positive with COVID-19 / SARS-CoV2 in the last 14 days: yes [  ]   no [x  ]    In ED,  Vitals: T 98.9F, HR 83, /82, RR 16, SpO2 4L NC  Labs significant for: WBC 13.57, N/L ratio 15.9, H/H 9.4/30.1, BUN 46, Cr 1.8  EKG: NSR at 78bpm  CT head non cont:  no acute intracranial hemorrhage or mass effect.  CT cervical spine non cont: no evidence of cervical spine fracture.  CXR: no acute pulmonary process  Left hip xray: subcapital left femoral neck fracture, marked external rotation of hip, age indeterminant fracture deformity involving the left ischiopubic ramus  COVID PCR obtained while in ED.    INTERVAL HPI:  4/23/20: Pt seen and examined. Planned on OR today. cardio clearance obtained. Patient kept NPO pending procedure. COVID 19 -NEG, Pt unable to urinate. chronic anemia  4/24/20: Pt seen and examined. S/p Hemiarthroplasty POD #1. Seen by Heme/onc for anemia, Low H/H  likely multifactorial. start on IV Venofer x 3 doses. Mild leukocytosis.  4/25/20: Pt seen and examined. S/p Hemiarthroplasty POD #2. H/H low today requiring 1x PRBC transfusion and started on venofer x3 days by heme.   4/26/20: Pt seen and examined. S/p Hemiarthroplasty POD #3. S/p 1u PRBC transfusion yesterday, on venofer. H/H stable today. Switched to PO xarelto for dvt ppx postop. d/c planning  4/27/20: pt seen, examined, POD # 4 S/P Hemiarthroplasty, H/H stable, D/C Planning.  4/28/2020: Patient seen an examined at bedside. Patient stable for D/C with home PT. Patient was having difficulty urinating overnight and bladder scan was performed, revealing 374 ccs. Straight cath performed with 275 ccs urine. Enema performed as patient tends to have difficulty urinating with constipation. Patient started on Flomax 0.4 mg PO daily. Pt will be discharge home with Flomax 0.4 mg PO daily, Xarelto (for hip Fx), folic acid 1 mg PO daily and was told to take multivitamin daily and folate 1 mg PO daily. stable for D/C home.    OVERNIGHT EVENTS: none    Home Medications:  acetaminophen 325 mg oral tablet: 2 tab(s) orally every 6 hours, As Needed (26 Apr 2020 12:19)  Amitiza 24 mcg oral capsule: 1 cap(s) orally 2 times a day (23 Apr 2020 11:40)  atorvastatin 10 mg oral tablet: 1 tab(s) orally once a day (23 Apr 2020 11:40)  escitalopram 20 mg oral tablet: 1 tab(s) orally once a day (23 Apr 2020 11:40)  finasteride 5 mg oral tablet: 1 tab(s) orally once a day (23 Apr 2020 11:40)  Metoprolol Succinate ER 50 mg oral tablet, extended release: 1 tab(s) orally once a day (23 Apr 2020 11:40)  traMADol 150 mg/24 hours oral capsule, extended release: 1 cap(s) orally once a day (23 Apr 2020 11:40)      MEDICATIONS  (STANDING):  ascorbic acid 500 milliGRAM(s) Oral two times a day  atorvastatin 10 milliGRAM(s) Oral at bedtime  escitalopram 20 milliGRAM(s) Oral daily  finasteride 5 milliGRAM(s) Oral daily  folic acid 1 milliGRAM(s) Oral daily  melatonin 3 milliGRAM(s) Oral at bedtime  metoprolol succinate ER 50 milliGRAM(s) Oral daily  multivitamin 1 Tablet(s) Oral daily  polyethylene glycol 3350 17 Gram(s) Oral daily  rivaroxaban 10 milliGRAM(s) Oral daily  senna 2 Tablet(s) Oral at bedtime  tamsulosin 0.4 milliGRAM(s) Oral at bedtime    MEDICATIONS  (PRN):  aluminum hydroxide/magnesium hydroxide/simethicone Suspension 30 milliLiter(s) Oral four times a day PRN Indigestion  benzocaine 15 mG/menthol 3.6 mG (Sugar-Free) Lozenge 1 Lozenge Oral every 3 hours PRN Sore Throat  magnesium hydroxide Suspension 30 milliLiter(s) Oral daily PRN Constipation  morphine  - Injectable 2 milliGRAM(s) IV Push every 6 hours PRN breakthrough pain  ondansetron Injectable 4 milliGRAM(s) IV Push every 6 hours PRN Nausea and/or Vomiting  traMADol 50 milliGRAM(s) Oral daily PRN 30 minutes prior to Physical Therapy  traMADol 50 milliGRAM(s) Oral every 6 hours PRN Severe Pain (7 - 10)  traMADol 25 milliGRAM(s) Oral every 4 hours PRN Moderate Pain (4 - 6)        Allergies  No Known Allergies    Intolerances        Social History:  Lives w/ wife.  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  Per wife, "easily confused."  Denies current or past tobacco use.  Infrequent etoh use.  CBD pills for pain. (23 Apr 2020 07:10)      REVIEW OF SYSTEMS: i feel OK today  CONSTITUTIONAL: No fever, No chills, No fatigue, No myalgia, No body ache, No weakness  EYES: No eye pain,  No visual disturbances, No discharge, No Redness.  ENMT:  No ear pain, No nose bleed, No vertigo; No sinus pain, No throat pain, No congestion.  NECK: No pain, No stiffness.  RESPIRATORY: No cough, No wheezing, No  hemoptysis, No shortness of breath.  CARDIOVASCULAR: No chest pain, palpitations.  GASTROINTESTINAL: No abdominal pain, No epigastric pain. No nausea, No vomiting; No diarrhea, No constipation. [ x ] BM  GENITOURINARY: No dysuria, No frequency, No urgency, No hematuria, No incontinence  NEUROLOGICAL: No headaches, No dizziness, No numbness, No tingling, No tremors, No weakness  EXT: No Swelling, No pain., No edema.  SKIN:  [x  ] No itching, burning, rashes, or lesions   MUSCULOSKELETAL: No joint pain or swelling; No muscle pain, No back pain, No extremity pain.  PSYCHIATRIC: No depression,  No anxiety,  No mood swings, No difficulty sleeping at night.  PAIN SCALE: [  x] None  [  ] Other-  ROS Unable to obtain due to - [  ] Dementia  [  ] Lethargy [  ] Drowsy [  ] Sedated [  ] non verbal  REST OF REVIEW Of SYSTEM - [x  ] Normal     Vital Signs Last 24 Hrs  T(C): 36.6 (28 Apr 2020 04:53), Max: 36.8 (27 Apr 2020 20:51)  T(F): 97.9 (28 Apr 2020 04:53), Max: 98.2 (27 Apr 2020 20:51)  HR: 89 (28 Apr 2020 04:53) (73 - 89)  BP: 158/90 (28 Apr 2020 04:53) (138/81 - 158/90)  BP(mean): --  RR: 18 (28 Apr 2020 04:53) (17 - 18)  SpO2: 93% (28 Apr 2020 04:53) (93% - 98%)      I&O's Summary    27 Apr 2020 07:01  -  28 Apr 2020 07:00  --------------------------------------------------------  IN: 0 mL / OUT: 525 mL / NET: -525 mL      PHYSICAL EXAM: Pt is forgetful  GENERAL:  [x  ] NAD , [  x] well appearing, [  ] Agitated, [  ] Drowsy,  [  ] Lethargy, [  ] confused   HEAD:  [x  ] Normal, [  ] Other  EYES:  [x  ] EOMI, [x  ] PERRLA, [  ] conjunctiva and sclera clear normal, [  ] Other,  [  ] Pallor,[  ] Discharge  ENMT:  [x ] Normal, [x  ] Dry  mucous membranes, [ x ] Good dentition, [x  ] No Thrush  NECK:  [x  ] Supple, [ x ] No JVD, [ x ] Normal thyroid, [  ] Lymphadenopathy [  ] Other  CHEST/LUNG:  [x  ] Clear to auscultation bilaterally, [ x ] Breath Sounds equal ,  [x  ] No rales, [x  ] No rhonchi  [x  ]  No wheezing  HEART:  [x  ] Regular rate and rhythm, [  ] tachycardia, [  ] Bradycardia,  [  ] irregular  [ x ] No murmurs, No rubs, No gallops, [  ] PPM in place (Mfr:  )  ABDOMEN:  [x  ] Soft, [ x ] Nontender, [ x ] Nondistended, [ x ] No mass, [ x ] Bowel sounds present, [x  ] obese  NERVOUS SYSTEM:  [ x ] Alert & Oriented X 1-,2 [ x ] Nonfocal  [x ] Confusion  [  ] Encephalopathic [  ] Sedated [  ] Unable to assess, [ x ] Other-? Dementia  EXTREMITIES: [ x ] 2+ Peripheral Pulses, No clubbing, No cyanosis, Left Hip dressing, mild swelling [  ] edema B/L lower EXT. [  ] PVD stasis skin changes B/L Lower EXT  LYMPH: No lymphadenopathy noted  SKIN:  [ x ] No rashes or lesions, [  ] Pressure Ulcers, [  ] ecchymosis, [  ] Skin Tears, [  ] Other      DIET: Regular      LABS:                        8.2    8.73  )-----------( 192      ( 28 Apr 2020 08:38 )             27.1     04-28    139  |  103  |  21  ----------------------------<  99  4.3   |  29  |  1.10    Ca    8.6      28 Apr 2020 08:38      CARDIAC MARKERS ( 23 Apr 2020 06:17 )  x     / x     / 365 U/L / x     / x            Anemia Panel:  Ferritin, Serum: 57 ng/mL (04-24-20 @ 11:06)  Folate, Serum: 4.6 ng/mL (04-24-20 @ 11:06)  Vitamin B12, Serum: 706 pg/mL (04-24-20 @ 11:06)  Iron Total, Serum: 23 ug/dL (04-24-20 @ 11:05)  Iron - Total Binding Capacity.: 356 ug/dL (04-24-20 @ 11:05)      Thyroid Panel:    RADIOLOGY & ADDITIONAL TESTS:NONE      HEALTH ISSUES - PROBLEM Dx:  D-dimer, elevated: D-dimer, elevated  Anemia: Anemia  Leukocytosis: Leukocytosis  Need for prophylactic measure: Need for prophylactic measure  BPH (benign prostatic hyperplasia): BPH (benign prostatic hyperplasia)  High cholesterol: High cholesterol  CAD (coronary artery disease): CAD (coronary artery disease)  Hypertension: Hypertension  MAYRA (acute kidney injury): MAYRA (acute kidney injury)  Closed fracture of left hip, initial encounter: Closed fracture of left hip, initial encounter          Consultant(s) Notes Reviewed:  [ x ] YES     Care Discussed with [X] Consultants  [x  ] Patient  [x  ] Family [  ] HCP [x  ]   [  ] Social Service  [ x ] RN, [  ] Physical Therapy,[  ] Palliative care team  DVT PPX: [  ] Lovenox, [ x ] S C Heparin, [  ] Coumadin, [ X ] Xarelto, [  ] Eliquis, [  ] Pradaxa, [  ] IV Heparin drip, [  ] SCD [  ] Contraindication 2 to GI Bleed,[  ] Ambulation [  ] Contraindicated 2 to  bleed [  ] Contraindicated 2 to Brain Bleed  Advanced directive: [ x ] None, [  ] DNR/DNI

## 2020-04-28 NOTE — PROGRESS NOTE ADULT - PROBLEM SELECTOR PLAN 5
BUN 46, Cr 1.8 on admission, Encourage PO fluids  -Cr improved to 1.10 on 4/28.  - patient likely has underlying ckd   - Per wife, no diagnosed hx of kidney disease

## 2020-04-28 NOTE — PROGRESS NOTE ADULT - SUBJECTIVE AND OBJECTIVE BOX
[facility] -- Estarda Bingham, Axel Diez, Guille Emanuel Savella  Office # [phone]      Follow Up:    Preop eval/Post follow up   Subjective/Observations:   Overnight events overnight noted now comfortably in bed.  No complaints of chest pain, dyspnea, or palpitations reported. No signs of orthopnea or PND. Tolerating room air     REVIEW OF SYSTEMS: All other review of systems is negative unless indicated above    PAST MEDICAL & SURGICAL HISTORY:  Anemia  S/P AVR (aortic valve replacement)  Mild CAD: Non Obstructive CAD  Chronic pain: s/p knee and hip replacements  High cholesterol  BPH (benign prostatic hyperplasia)  Hypertension  S/P AVR (aortic valve replacement)  History of total hip replacement, right  History of total right knee replacement (TKR): X2      MEDICATIONS  (STANDING):  ascorbic acid 500 milliGRAM(s) Oral two times a day  atorvastatin 10 milliGRAM(s) Oral at bedtime  escitalopram 20 milliGRAM(s) Oral daily  finasteride 5 milliGRAM(s) Oral daily  folic acid 1 milliGRAM(s) Oral daily  melatonin 3 milliGRAM(s) Oral at bedtime  metoprolol succinate ER 50 milliGRAM(s) Oral daily  multivitamin 1 Tablet(s) Oral daily  polyethylene glycol 3350 17 Gram(s) Oral daily  rivaroxaban 10 milliGRAM(s) Oral daily  senna 2 Tablet(s) Oral at bedtime    MEDICATIONS  (PRN):  aluminum hydroxide/magnesium hydroxide/simethicone Suspension 30 milliLiter(s) Oral four times a day PRN Indigestion  benzocaine 15 mG/menthol 3.6 mG (Sugar-Free) Lozenge 1 Lozenge Oral every 3 hours PRN Sore Throat  magnesium hydroxide Suspension 30 milliLiter(s) Oral daily PRN Constipation  morphine  - Injectable 2 milliGRAM(s) IV Push every 6 hours PRN breakthrough pain  ondansetron Injectable 4 milliGRAM(s) IV Push every 6 hours PRN Nausea and/or Vomiting  traMADol 50 milliGRAM(s) Oral daily PRN 30 minutes prior to Physical Therapy  traMADol 50 milliGRAM(s) Oral every 6 hours PRN Severe Pain (7 - 10)  traMADol 25 milliGRAM(s) Oral every 4 hours PRN Moderate Pain (4 - 6)      Allergies    No Known Allergies    Intolerances        Vital Signs Last 24 Hrs  T(C): 36.6 (28 Apr 2020 04:53), Max: 36.8 (27 Apr 2020 20:51)  T(F): 97.9 (28 Apr 2020 04:53), Max: 98.2 (27 Apr 2020 20:51)  HR: 89 (28 Apr 2020 04:53) (73 - 90)  BP: 158/90 (28 Apr 2020 04:53) (138/81 - 158/90)  BP(mean): --  RR: 18 (28 Apr 2020 04:53) (17 - 18)  SpO2: 93% (28 Apr 2020 04:53) (93% - 98%)    I&O's Summary    27 Apr 2020 07:01  -  28 Apr 2020 07:00  --------------------------------------------------------  IN: 0 mL / OUT: 525 mL / NET: -525 mL          PHYSICAL EXAM:  TELE: Off tele   Constitutional: NAD, awake and alert, well-developed  HEENT: Moist Mucous Membranes, Anicteric  Pulmonary: Non-labored, breath sounds are clear bilaterally, No wheezing, crackles or rhonchi  Cardiovascular: Regular, S1 and S2 nl, No murmurs, rubs, gallops or clicks  Gastrointestinal: Bowel Sounds present, soft, nontender.   Lymph: No lymphadenopathy. No peripheral edema.  Skin: No visible rashes or ulcers.  Psych:  Mood & affect appropriate    LABS: All Labs Reviewed:                        9.3    9.75  )-----------( 207      ( 27 Apr 2020 08:58 )             31.1                         8.7    10.92 )-----------( 162      ( 26 Apr 2020 09:15 )             28.7                         8.2    x     )-----------( x        ( 25 Apr 2020 17:01 )             26.6     27 Apr 2020 08:58    140    |  105    |  26     ----------------------------<  112    3.5     |  28     |  1.20   26 Apr 2020 09:15    139    |  104    |  27     ----------------------------<  112    3.8     |  28     |  1.30     Ca    8.9        27 Apr 2020 08:58  Ca    8.7        26 Apr 2020 09:15        ECG:  < from: 12 Lead ECG (04.23.20 @ 05:57) >  Ventricular Rate 78 BPM    Atrial Rate 78 BPM    P-R Interval 178 ms    QRS Duration 82 ms    Q-T Interval 398 ms    QTC Calculation(Bezet) 453 ms    P Axis 68 degrees    R Axis 60 degrees    T Axis 54 degrees    Diagnosis Line Normal sinus rhythm  Possible Left atrial enlargement  Borderline ECG  No previous ECGs available  Confirmed by Rg NIELSON, Christiano ([id number]) on 4/23/2020 2:45:18 PM    < end of copied text >

## 2020-04-28 NOTE — CHART NOTE - NSCHARTNOTEFT_GEN_A_CORE
Called by RN for patient who was unable to urinate during the night. Will check bladder scan. Straight cath if >350 cc. RN to call for any changes.

## 2020-04-28 NOTE — PROGRESS NOTE ADULT - ASSESSMENT
86y/o M with PMH of HTN, HLD, CAD (s/p open heart surgery 5yrs ago), chronic pain (s/p knee and rt hip replacement), BPH, and mild confusion (per wife) BIBEMS to [facility] ED s/p fall, admitted for left hip fracture. S/p hip hemiarthroplasty POD4

## 2020-04-28 NOTE — PROGRESS NOTE ADULT - PROBLEM SELECTOR PLAN 4
- admission d-dimer elevated likely 2/2 acute injury and surgery as well as underlying ckd, and age   - patient HD stable, saturating well on room air, non tachycardic, low suspicion for PE at this time  - repeat ddimer 831 (was 2682)

## 2020-04-28 NOTE — PROGRESS NOTE ADULT - PROBLEM SELECTOR PLAN 1
Admit to general medical floors  -Left hip xray: subcapital left femoral neck fracture, marked external rotation of hip, age indeterminant fracture deformity involving the left ischiopubic ramus  -CT head and cervical spine w/ no acute findings   -Pt w/ left hip fx s/p fall  -POD  # 5 s/p hip hemiarthroplasty  - PT consulted. Pt D/abigail home on xarelto and tramadol  - Bedrest, fall risk protocol   - Spoke to family; who reports that patient had severe AMS and agitation when given Dilaudid or oxycodone. Will order tramadol 50 PRN for severe, tramadol 25 PRN for moderate, tylenols Q6H for pain, and morphine for breakthrough pain.

## 2020-04-28 NOTE — PROGRESS NOTE ADULT - ATTENDING COMMENTS
Chart reviewed    Patient seen and examined    Agree with plan as outlined above
I personally saw and examined the patient in detail.  I have spoken to the above provider regarding the assessment and plan of care, and agree.  I have made changes in the body of the note where appropriate.
I personally saw and examined the patient in detail.  I have spoken to the above provider regarding the assessment and plan of care, and agree.  I have made changes in the body of the note where appropriate.
Pt seen, Examined, Case & care plan d/w pt, residents at detail.  D/C home with Home care PT  D/W Wife & Case management.
Pt seen, Examined, Case & care plan d/w pt, residents at detail.  D/W wife at detail. about Urinary retention & straight cath x 2 , pt & wife refusing alford cath , out pt follow up with Urologist , Rx constipation , d/w wife if pt can not Void at home , he may need to come back to ER for alford cath placement    D/C Home today with HCPT  Total d/c care time is 45 minutes.
Pt seen, examined, case & care plan d/w pt, residents at detail.  NPO, D/W Dr Gandhi -orthopaedics for OR today  D/W Wife at detail.  NO Oxycodone as per wife , pt can take Tylenol, & Tramadol .
Pt seen, examined, Case & care plan d/w pt, residents at detail.  AM labs , D/W Hematologist   D/W wife & Case management at detail.
Pt seen, examined, case & care plan d/w pt, residents at detail. D/W Dr Gandhi -Ortho can start Xarelto 10 mg daily, or   mg 2x day for 4 weeks, DVT PPx   D/W wife at detail about D/C planning.  AM labs , d/w Dr marie at detail.
Pt seen, Examined, case & care plan d/w pt, residents at detail.  POD # 1, D/W PT , wife & Social service at detail.  Straight cath ~200 ml urine, Enema x 1 for constipation  PT--> ROD

## 2020-04-28 NOTE — PROGRESS NOTE ADULT - REASON FOR ADMISSION
Left hip fracture (subcapital left femoral neck fracture)  Patients cell #: [phone]
Left hip fracture (subcapital left femoral neck fracture)  Patients cell #: [phone]
Left hip fracture (subcapital left femoral neck fracture)  Patients cell #: [phone]
Left hip fracture (subcapital left femoral neck fracture)  Patients cell #: [phone]
Left hip fracture (subcapital left femoral neck fracture)  Patients cell #: [phone]
Left hip fracture (subcapital left femoral neck fracture)  Patients cell #: [phone]
Left hip fracture (subcapital left femoral neck fracture)  Patients cell #: [phone]
Left hip fracture (subcapital left femoral neck fracture)  Patients cell #: [phone]
Left hip fracture (subcapital left femoral neck fracture)  Patients cell #: [phone]
Left hip fracture (subcapital left femoral neck fracture)  Patients cell #: [phone]
Left hip fracture (subcapital left femoral neck fracture)  Patients cell #: [phone]
Left hip fracture (subcapital left femoral neck fracture)  Patients cell #: [phone]
Left hip fracture (subcapital left femoral neck fracture)  Patients cell #: [phone]
Left hip fracture (subcapital left femoral neck fracture)  Patients cell #: [phone]
Left hip fracture (subcapital left femoral neck fracture)  Patients cell #: [phone]
Left hip fracture (subcapital left femoral neck fracture)  Patients cell #: [phone]
Left hip fracture (subcapital left femoral neck fracture)  Patients cell #: [phone]
Left hip fracture (subcapital left femoral neck fracture)  Patients cell #: [phone]

## 2020-04-28 NOTE — PROGRESS NOTE ADULT - ASSESSMENT
85 year old male with PMH of HTN, HLD, CAD (s/p open heart surgery 5yrs ago), chronic pain (s/p knee and rt hip replacement), BPH, and mild confusion (per wife) BIBEMS to [facility] ED s/p fall, admitted for left hip fracture.    L Hip Hemiarthroplasty   -W/O cardiac complications   -Per ortho  -Analgesia  -Encourage incentive spirometry    MAYRA   -resolved  -per medicine    Anemia.  -S/P I unit PRBC   -per medicine    CAD  -Continue Toprol xl 50mg daily   -Continue 10mg atorvastatin daily    -From a cardiac standpoint the patient may be discharged     Monitor and replete electrolytes. Keep K>4.0 and Mg>2.0.    Christiano Salazar [facility]  Cardiology   Spectra #[phone]

## 2020-09-10 NOTE — ED ADULT NURSE NOTE - NEURO MENTATION
History   Chief Complaint:  Syncope    HPI   Renee Xie is a 69 year old female, with a history of provoked DVT and pulmonary embolism, who presents to the ED for evaluation of pre syncopal episode. The patient reports she was out gardening and exerting herself earlier today with minimal oral intake. She states she then had some looser stools, but not diarrhea in her mind. She denies any bloody stools, nausea, vomiting, or abdominal pain. She notes she called EMS today as she was out in the community and felt lightheaded to the point where she thought she would lose consciousness. Here in the emergency department, the patient does mention she might be dehydrated. She did not have any vision loss, shortness of breath, chest pain, or palpitations prior to the lightheadedness. She denies any fever, cough, numbness, weakness, or any other acute symptoms.    Allergies:  Acetaminophen    Medications:    The patient is not currently taking any prescribed medications.    Past Medical History:    DVT    Past Surgical History:    Appendectomy  Cholecystectomy  Deviated septum repair    Family History:    Alcohol abuse  Lung cancer  Pancreatic cancer    Social History:  Smoking status: Never  Alcohol use: No  Drug use: No  PCP: Peterson Horowitz MD  Marital Status:   [5]    Review of Systems   Constitutional: Negative for fever.   Respiratory: Negative for cough and shortness of breath.    Cardiovascular: Negative for chest pain and palpitations.   Gastrointestinal: Positive for diarrhea. Negative for abdominal pain, blood in stool, nausea and vomiting.   Neurological: Positive for light-headedness. Negative for syncope, weakness and numbness.   All other systems reviewed and are negative.     Physical Exam     Patient Vitals for the past 24 hrs:   BP Temp Temp src Pulse Resp SpO2   09/10/20 1714 -- 97.3  F (36.3  C) Temporal -- -- --   09/10/20 1700 137/78 -- -- 65 13 98 %   09/10/20 1645 134/82 -- -- 66 22 98  %   09/10/20 1630 129/75 -- -- 63 9 98 %   09/10/20 1615 (!) 142/77 -- -- 61 17 97 %   09/10/20 1600 -- -- -- 66 15 100 %   09/10/20 1533 109/69 -- -- 83 20 97 %       Physical Exam  Eyes:  Sclera white; Pupils are equal and round  ENT:    External ears and nares normal  CV:  Rate as above with regular rhythm, no BLE edema  Resp:  Breath sounds clear and equal bilaterally    Non-labored, no retractions or accessory muscle use  GI:  Abdomen is soft, non-tender, non-distended    No rebound tenderness or peritoneal features  MS:  Moves all extremities  Skin:  Warm and dry  Neuro:  Speech is normal and fluent. No apparent deficit.        Emergency Department Course   ECG (15:34:42):  Rate 67 bpm. DE interval 174. QRS duration 78. QT/QTc 460/454. P-R-T axes 52 3 61. Normal sinus rhythm. Cannot rule out inferior infarct, age undetermined. Abnormal ECG. No significant change compared to EKG on 5/14/06.  Interpreted at 1620 by Gissell Mendez MD.    Laboratory:  Laboratory findings were communicated with the patient who voiced understanding of the findings.    CBC: WNL (WBC 6.8, HGB 15.3, )    CMP: Cl 112 (H),  (H), Creatinine 1.07 (H), GFR 54 (L), Ca 8.2 (L), Albumin   3.2 (L), Protein Total 6.2 (L), o/w WNL    Troponin: <0.015     Interventions:  1543: NS 1L IV Bolus     Emergency Department Course:  Past medical records, nursing notes, and vitals reviewed.    1551 I performed an exam of the patient as documented above.     EKG obtained in the ED, see results above.   IV was inserted and blood was drawn for laboratory testing, results above.    1818 I rechecked the patient and discussed the results of her workup thus far.     Findings and plan explained to the Patient. Patient discharged home with instructions regarding supportive care, medications, and reasons to return. The importance of close follow-up was reviewed.    I personally reviewed the laboratory results with the Patient and answered all  related questions prior to discharge.     Impression & Plan   Medical Decision Making:  Renee Xie is a 69 year old female who presents for evaluation of a near syncopal event. There were multiple unusual circumstances today suggesting this may be related to volume depletion as well as a vasovagal event. Given her age and risk for cardiac abnormality, EKG was obtained.  No evidence of dysrhythmia, ischemia, or pericarditis. Blood work shows no evidence of contributing anemia, sepsis, electrolyte abnormalities, or renal insufficiencies. Creatinine is noted to be just slightly elevated beyond her baseline, which could suggest a dehydration component. She received IV fluids here and will be continuing to drink more fluids at home. At this point, no serious underlying cause was identified and she is safe for outpatient management. Reasons for immediate return were discussed.      Diagnosis:    ICD-10-CM    1. Near syncope  R55    2. Dehydration  E86.0        Disposition:  Discharged to home.    Scribe Disclosure:  Attila MCKEON, am serving as a scribe at 3:51 PM on 9/10/2020 to document services personally performed by Gissell Mendez MD based on my observations and the provider's statements to me.        Gissell Mendez MD  09/11/20 0007     normal

## 2020-12-09 ENCOUNTER — INPATIENT (INPATIENT)
Facility: HOSPITAL | Age: 85
LOS: 1 days | Discharge: NURSING, REHAB OR LONG-TERM CARE | DRG: 552 | End: 2020-12-11
Attending: INTERNAL MEDICINE | Admitting: INTERNAL MEDICINE
Payer: MEDICARE

## 2020-12-09 VITALS
WEIGHT: 179.9 LBS | HEIGHT: 68 IN | SYSTOLIC BLOOD PRESSURE: 170 MMHG | DIASTOLIC BLOOD PRESSURE: 71 MMHG | HEART RATE: 92 BPM | TEMPERATURE: 97 F | RESPIRATION RATE: 16 BRPM | OXYGEN SATURATION: 100 %

## 2020-12-09 DIAGNOSIS — G89.29 OTHER CHRONIC PAIN: ICD-10-CM

## 2020-12-09 DIAGNOSIS — E78.00 PURE HYPERCHOLESTEROLEMIA, UNSPECIFIED: ICD-10-CM

## 2020-12-09 DIAGNOSIS — M54.9 DORSALGIA, UNSPECIFIED: ICD-10-CM

## 2020-12-09 DIAGNOSIS — F41.9 ANXIETY DISORDER, UNSPECIFIED: ICD-10-CM

## 2020-12-09 DIAGNOSIS — N40.0 BENIGN PROSTATIC HYPERPLASIA WITHOUT LOWER URINARY TRACT SYMPTOMS: ICD-10-CM
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
## 2020-12-09 DIAGNOSIS — I10 ESSENTIAL (PRIMARY) HYPERTENSION: ICD-10-CM

## 2020-12-09 DIAGNOSIS — Z95.2 PRESENCE OF PROSTHETIC HEART VALVE: ICD-10-CM

## 2020-12-09 DIAGNOSIS — Z29.9 ENCOUNTER FOR PROPHYLACTIC MEASURES, UNSPECIFIED: ICD-10-CM

## 2020-12-09 DIAGNOSIS — F03.90 UNSPECIFIED DEMENTIA WITHOUT BEHAVIORAL DISTURBANCE: ICD-10-CM

## 2020-12-09 DIAGNOSIS — Z95.2 PRESENCE OF PROSTHETIC HEART VALVE: Chronic | ICD-10-CM

## 2020-12-09 DIAGNOSIS — D64.9 ANEMIA, UNSPECIFIED: ICD-10-CM

## 2020-12-09 DIAGNOSIS — Z96.651 PRESENCE OF RIGHT ARTIFICIAL KNEE JOINT: Chronic | ICD-10-CM

## 2020-12-09 DIAGNOSIS — Z96.641 PRESENCE OF RIGHT ARTIFICIAL HIP JOINT: Chronic | ICD-10-CM

## 2020-12-09 PROBLEM — I25.10 ATHEROSCLEROTIC HEART DISEASE OF NATIVE CORONARY ARTERY WITHOUT ANGINA PECTORIS: Chronic | Status: ACTIVE | Noted: 2020-04-23

## 2020-12-09 LAB
ALBUMIN SERPL ELPH-MCNC: 3.2 G/DL — LOW (ref 3.3–5)
ALP SERPL-CCNC: 111 U/L — SIGNIFICANT CHANGE UP (ref 40–120)
ALT FLD-CCNC: 39 U/L — SIGNIFICANT CHANGE UP (ref 12–78)
ANION GAP SERPL CALC-SCNC: 8 MMOL/L — SIGNIFICANT CHANGE UP (ref 5–17)
APPEARANCE UR: ABNORMAL
AST SERPL-CCNC: 41 U/L — HIGH (ref 15–37)
BASOPHILS # BLD AUTO: 0.03 K/UL — SIGNIFICANT CHANGE UP (ref 0–0.2)
BASOPHILS NFR BLD AUTO: 0.3 % — SIGNIFICANT CHANGE UP (ref 0–2)
BILIRUB SERPL-MCNC: 0.6 MG/DL — SIGNIFICANT CHANGE UP (ref 0.2–1.2)
BILIRUB UR-MCNC: NEGATIVE — SIGNIFICANT CHANGE UP
BUN SERPL-MCNC: 23 MG/DL — SIGNIFICANT CHANGE UP (ref 7–23)
CALCIUM SERPL-MCNC: 9.2 MG/DL — SIGNIFICANT CHANGE UP (ref 8.5–10.1)
CHLORIDE SERPL-SCNC: 103 MMOL/L — SIGNIFICANT CHANGE UP (ref 96–108)
CO2 SERPL-SCNC: 28 MMOL/L — SIGNIFICANT CHANGE UP (ref 22–31)
COLOR SPEC: YELLOW — SIGNIFICANT CHANGE UP
CREAT SERPL-MCNC: 1.3 MG/DL — SIGNIFICANT CHANGE UP (ref 0.5–1.3)
DIFF PNL FLD: ABNORMAL
EOSINOPHIL # BLD AUTO: 0.01 K/UL — SIGNIFICANT CHANGE UP (ref 0–0.5)
EOSINOPHIL NFR BLD AUTO: 0.1 % — SIGNIFICANT CHANGE UP (ref 0–6)
EPI CELLS # UR: SIGNIFICANT CHANGE UP
GLUCOSE SERPL-MCNC: 92 MG/DL — SIGNIFICANT CHANGE UP (ref 70–99)
GLUCOSE UR QL: NEGATIVE — SIGNIFICANT CHANGE UP
HCT VFR BLD CALC: 33.2 % — LOW (ref 39–50)
HGB BLD-MCNC: 10.9 G/DL — LOW (ref 13–17)
IMM GRANULOCYTES NFR BLD AUTO: 0.3 % — SIGNIFICANT CHANGE UP (ref 0–1.5)
KETONES UR-MCNC: ABNORMAL
LEUKOCYTE ESTERASE UR-ACNC: NEGATIVE — SIGNIFICANT CHANGE UP
LYMPHOCYTES # BLD AUTO: 1.07 K/UL — SIGNIFICANT CHANGE UP (ref 1–3.3)
LYMPHOCYTES # BLD AUTO: 11.6 % — LOW (ref 13–44)
MCHC RBC-ENTMCNC: 29.8 PG — SIGNIFICANT CHANGE UP (ref 27–34)
MCHC RBC-ENTMCNC: 32.8 GM/DL — SIGNIFICANT CHANGE UP (ref 32–36)
MCV RBC AUTO: 90.7 FL — SIGNIFICANT CHANGE UP (ref 80–100)
MONOCYTES # BLD AUTO: 0.88 K/UL — SIGNIFICANT CHANGE UP (ref 0–0.9)
MONOCYTES NFR BLD AUTO: 9.5 % — SIGNIFICANT CHANGE UP (ref 2–14)
NEUTROPHILS # BLD AUTO: 7.2 K/UL — SIGNIFICANT CHANGE UP (ref 1.8–7.4)
NEUTROPHILS NFR BLD AUTO: 78.2 % — HIGH (ref 43–77)
NITRITE UR-MCNC: NEGATIVE — SIGNIFICANT CHANGE UP
NRBC # BLD: 0 /100 WBCS — SIGNIFICANT CHANGE UP (ref 0–0)
PH UR: 7 — SIGNIFICANT CHANGE UP (ref 5–8)
PLATELET # BLD AUTO: 225 K/UL — SIGNIFICANT CHANGE UP (ref 150–400)
POTASSIUM SERPL-MCNC: 4 MMOL/L — SIGNIFICANT CHANGE UP (ref 3.5–5.3)
POTASSIUM SERPL-SCNC: 4 MMOL/L — SIGNIFICANT CHANGE UP (ref 3.5–5.3)
PROT SERPL-MCNC: 7.1 G/DL — SIGNIFICANT CHANGE UP (ref 6–8.3)
PROT UR-MCNC: 30 MG/DL
RBC # BLD: 3.66 M/UL — LOW (ref 4.2–5.8)
RBC # FLD: 11.5 % — SIGNIFICANT CHANGE UP (ref 10.3–14.5)
RBC CASTS # UR COMP ASSIST: ABNORMAL /HPF (ref 0–4)
SARS-COV-2 RNA SPEC QL NAA+PROBE: SIGNIFICANT CHANGE UP
SODIUM SERPL-SCNC: 139 MMOL/L — SIGNIFICANT CHANGE UP (ref 135–145)
SP GR SPEC: 1.01 — SIGNIFICANT CHANGE UP (ref 1.01–1.02)
UROBILINOGEN FLD QL: NEGATIVE — SIGNIFICANT CHANGE UP
WBC # BLD: 9.22 K/UL — SIGNIFICANT CHANGE UP (ref 3.8–10.5)
WBC # FLD AUTO: 9.22 K/UL — SIGNIFICANT CHANGE UP (ref 3.8–10.5)
WBC UR QL: SIGNIFICANT CHANGE UP

## 2020-12-09 PROCEDURE — 71045 X-RAY EXAM CHEST 1 VIEW: CPT | Mod: 26

## 2020-12-09 PROCEDURE — 99285 EMERGENCY DEPT VISIT HI MDM: CPT

## 2020-12-09 PROCEDURE — 73562 X-RAY EXAM OF KNEE 3: CPT | Mod: 26,50

## 2020-12-09 PROCEDURE — 70450 CT HEAD/BRAIN W/O DYE: CPT | Mod: 26

## 2020-12-09 PROCEDURE — 73502 X-RAY EXAM HIP UNI 2-3 VIEWS: CPT | Mod: 26,RT

## 2020-12-09 PROCEDURE — 93010 ELECTROCARDIOGRAM REPORT: CPT

## 2020-12-09 PROCEDURE — 72220 X-RAY EXAM SACRUM TAILBONE: CPT | Mod: 26

## 2020-12-09 PROCEDURE — 72131 CT LUMBAR SPINE W/O DYE: CPT | Mod: 26

## 2020-12-09 RX ORDER — TRAMADOL HYDROCHLORIDE 50 MG/1
1 TABLET ORAL
Qty: 0 | Refills: 0 | DISCHARGE

## 2020-12-09 RX ORDER — ACETAMINOPHEN 500 MG
650 TABLET ORAL ONCE
Refills: 0 | Status: COMPLETED | OUTPATIENT
Start: 2020-12-09 | End: 2020-12-09

## 2020-12-09 RX ORDER — POLYETHYLENE GLYCOL 3350 17 G/17G
17 POWDER, FOR SOLUTION ORAL
Refills: 0 | Status: DISCONTINUED | OUTPATIENT
Start: 2020-12-09 | End: 2020-12-11

## 2020-12-09 RX ORDER — OMEGA-3 ACID ETHYL ESTERS 1 G
4 CAPSULE ORAL DAILY
Refills: 0 | Status: DISCONTINUED | OUTPATIENT
Start: 2020-12-09 | End: 2020-12-11

## 2020-12-09 RX ORDER — LIDOCAINE 4 G/100G
1 CREAM TOPICAL DAILY
Refills: 0 | Status: DISCONTINUED | OUTPATIENT
Start: 2020-12-10 | End: 2020-12-11

## 2020-12-09 RX ORDER — ACETAMINOPHEN 500 MG
650 TABLET ORAL EVERY 4 HOURS
Refills: 0 | Status: DISCONTINUED | OUTPATIENT
Start: 2020-12-09 | End: 2020-12-10

## 2020-12-09 RX ORDER — FINASTERIDE 5 MG/1
5 TABLET, FILM COATED ORAL DAILY
Refills: 0 | Status: DISCONTINUED | OUTPATIENT
Start: 2020-12-09 | End: 2020-12-11

## 2020-12-09 RX ORDER — SENNA PLUS 8.6 MG/1
2 TABLET ORAL AT BEDTIME
Refills: 0 | Status: DISCONTINUED | OUTPATIENT
Start: 2020-12-09 | End: 2020-12-11

## 2020-12-09 RX ORDER — SODIUM CHLORIDE 9 MG/ML
500 INJECTION INTRAMUSCULAR; INTRAVENOUS; SUBCUTANEOUS ONCE
Refills: 0 | Status: COMPLETED | OUTPATIENT
Start: 2020-12-09 | End: 2020-12-09

## 2020-12-09 RX ORDER — LUBIPROSTONE 24 UG/1
1 CAPSULE, GELATIN COATED ORAL
Qty: 0 | Refills: 0 | DISCHARGE

## 2020-12-09 RX ORDER — CHOLECALCIFEROL (VITAMIN D3) 125 MCG
2000 CAPSULE ORAL DAILY
Refills: 0 | Status: DISCONTINUED | OUTPATIENT
Start: 2020-12-09 | End: 2020-12-11

## 2020-12-09 RX ORDER — TRAMADOL HYDROCHLORIDE 50 MG/1
50 TABLET ORAL EVERY 6 HOURS
Refills: 0 | Status: DISCONTINUED | OUTPATIENT
Start: 2020-12-09 | End: 2020-12-10

## 2020-12-09 RX ORDER — LIDOCAINE 4 G/100G
1 CREAM TOPICAL ONCE
Refills: 0 | Status: COMPLETED | OUTPATIENT
Start: 2020-12-09 | End: 2020-12-09

## 2020-12-09 RX ORDER — ATORVASTATIN CALCIUM 80 MG/1
10 TABLET, FILM COATED ORAL AT BEDTIME
Refills: 0 | Status: DISCONTINUED | OUTPATIENT
Start: 2020-12-09 | End: 2020-12-11

## 2020-12-09 RX ORDER — KETOROLAC TROMETHAMINE 30 MG/ML
15 SYRINGE (ML) INJECTION ONCE
Refills: 0 | Status: DISCONTINUED | OUTPATIENT
Start: 2020-12-09 | End: 2020-12-09

## 2020-12-09 RX ORDER — TRAMADOL HYDROCHLORIDE 50 MG/1
25 TABLET ORAL EVERY 6 HOURS
Refills: 0 | Status: DISCONTINUED | OUTPATIENT
Start: 2020-12-09 | End: 2020-12-10

## 2020-12-09 RX ORDER — ENOXAPARIN SODIUM 100 MG/ML
40 INJECTION SUBCUTANEOUS DAILY
Refills: 0 | Status: DISCONTINUED | OUTPATIENT
Start: 2020-12-09 | End: 2020-12-11

## 2020-12-09 RX ORDER — METOPROLOL TARTRATE 50 MG
1 TABLET ORAL
Qty: 0 | Refills: 0 | DISCHARGE

## 2020-12-09 RX ORDER — ESCITALOPRAM OXALATE 10 MG/1
20 TABLET, FILM COATED ORAL DAILY
Refills: 0 | Status: DISCONTINUED | OUTPATIENT
Start: 2020-12-09 | End: 2020-12-11

## 2020-12-09 RX ORDER — MOMETASONE FUROATE 1 MG/G
1 CREAM TOPICAL DAILY
Refills: 0 | Status: DISCONTINUED | OUTPATIENT
Start: 2020-12-09 | End: 2020-12-09

## 2020-12-09 RX ADMIN — SODIUM CHLORIDE 500 MILLILITER(S): 9 INJECTION INTRAMUSCULAR; INTRAVENOUS; SUBCUTANEOUS at 14:45

## 2020-12-09 RX ADMIN — Medication 650 MILLIGRAM(S): at 15:55

## 2020-12-09 RX ADMIN — Medication 15 MILLIGRAM(S): at 15:55

## 2020-12-09 RX ADMIN — TRAMADOL HYDROCHLORIDE 25 MILLIGRAM(S): 50 TABLET ORAL at 22:21

## 2020-12-09 RX ADMIN — Medication 650 MILLIGRAM(S): at 17:00

## 2020-12-09 RX ADMIN — ATORVASTATIN CALCIUM 10 MILLIGRAM(S): 80 TABLET, FILM COATED ORAL at 21:56

## 2020-12-09 RX ADMIN — LIDOCAINE 1 PATCH: 4 CREAM TOPICAL at 20:40

## 2020-12-09 RX ADMIN — TRAMADOL HYDROCHLORIDE 25 MILLIGRAM(S): 50 TABLET ORAL at 22:51

## 2020-12-09 RX ADMIN — POLYETHYLENE GLYCOL 3350 17 GRAM(S): 17 POWDER, FOR SOLUTION ORAL at 20:38

## 2020-12-09 RX ADMIN — SENNA PLUS 2 TABLET(S): 8.6 TABLET ORAL at 21:56

## 2020-12-09 RX ADMIN — Medication 15 MILLIGRAM(S): at 17:00

## 2020-12-09 RX ADMIN — LIDOCAINE 1 PATCH: 4 CREAM TOPICAL at 15:56

## 2020-12-09 RX ADMIN — SODIUM CHLORIDE 500 MILLILITER(S): 9 INJECTION INTRAMUSCULAR; INTRAVENOUS; SUBCUTANEOUS at 13:45

## 2020-12-09 NOTE — H&P ADULT - RS GEN PE MLT RESP DETAILS PC
no rhonchi/respirations non-labored/breath sounds equal/clear to auscultation bilaterally/no wheezes/normal/no rales

## 2020-12-09 NOTE — ED PROVIDER NOTE - CARE PLAN
Principal Discharge DX:	Back pain  Secondary Diagnosis:	Dehydration  Secondary Diagnosis:	Fall at home

## 2020-12-09 NOTE — ED ADULT NURSE NOTE - PSH
History of total hip replacement, right    History of total right knee replacement (TKR)  X2  S/P AVR (aortic valve replacement)

## 2020-12-09 NOTE — H&P ADULT - NSHPPHYSICALEXAM_GEN_ALL_CORE
T(C): 36.2 (12-09-20 @ 11:14), Max: 36.2 (12-09-20 @ 11:14)  HR: 92 (12-09-20 @ 11:14) (92 - 92)  BP: 170/71 (12-09-20 @ 11:14) (170/71 - 170/71)  RR: 16 (12-09-20 @ 11:14) (16 - 16)  SpO2: 100% (12-09-20 @ 11:14) (100% - 100%)    GENERAL: patient appears well, no acute distress, appropriate, pleasant  EYES: sclera clear, no exudates  ENMT: oropharynx clear without erythema, no exudates, moist mucous membranes  NECK: supple, soft, no thyromegaly noted  LUNGS: good air entry bilaterally, clear to auscultation, symmetric breath sounds, no wheezing or rhonchi appreciated  HEART: soft S1/S2, regular rate and rhythm, no murmurs noted, no lower extremity edema  GASTROINTESTINAL: abdomen is soft, nontender, nondistended, normoactive bowel sounds, no palpable masses  INTEGUMENT: good skin turgor, no lesions noted  MUSCULOSKELETAL: no clubbing or cyanosis, no obvious deformity  NEUROLOGIC: awake, alert, oriented x3, good muscle tone in 4 extremities, no obvious sensory deficits  PSYCHIATRIC: mood is good, affect is congruent, linear and logical thought process  HEME/LYMPH: no palpable supraclavicular nodules, no obvious ecchymosis or petechiae T(C): 36.2 (12-09-20 @ 11:14), Max: 36.2 (12-09-20 @ 11:14)  HR: 92 (12-09-20 @ 11:14) (92 - 92)  BP: 170/71 (12-09-20 @ 11:14) (170/71 - 170/71)  RR: 16 (12-09-20 @ 11:14) (16 - 16)  SpO2: 100% (12-09-20 @ 11:14) (100% - 100%)    GENERAL: patient appears well, no acute distress, appropriate, pleasant  EYES: sclera clear, no exudates, EOMI  ENMT: oropharynx clear without erythema, no exudates, moist mucous membranes  LUNGS: good air entry bilaterally, clear to auscultation, symmetric breath sounds, no wheezing or rhonchi appreciated  HEART: soft S1/S2, regular rate and rhythm, no murmurs noted, no lower extremity edema  GASTROINTESTINAL: abdomen is soft, nontender, nondistended, normoactive bowel sounds, no palpable masses  MUSCULOSKELETAL: no clubbing or cyanosis, no obvious deformity. (+) mild TTP of midline sacrococcygeal area. Cervical, thoracic, and lumbar spine nonTTP, no step offs. Neck full ROM, normocephalic atraumatic. Strength 5/5 in all extremities. Normal sensation to light touch.  NEUROLOGIC: awake, alert, oriented x2 (oriented to self and place, states president elect is Biden, thinks the year is 1978), good muscle tone in 4 extremities, no obvious sensory deficits  PSYCHIATRIC: mood is good, affect is congruent, slowed and diminished thought process

## 2020-12-09 NOTE — H&P ADULT - NSHPREVIEWOFSYSTEMS_GEN_ALL_CORE
Constitutional: denies fever, chills, diaphoresis   HEENT: denies blurry vision, double vision, eye pain, difficulty hearing  Respiratory: denies SOB, cough, sputum production  Cardiovascular: denies CP, palpitations, edema  Gastrointestinal: denies nausea, vomiting, diarrhea, constipation, abdominal pain  Genitourinary: denies dysuria, frequency, urgency, hematuria   Skin/Breast: denies rash, itching  MSK: Positive for low back pain  Neurologic: denies headache, weakness, dizziness, paresthesias, numbness/tingling  Psychiatric: denies anxiety, depression, suicidal, homicidal thoughts  ROS negative except as noted above Constitutional: denies fever, chills, diaphoresis   HEENT: denies blurry vision, double vision, eye pain, difficulty hearing  Respiratory: denies SOB, cough, sputum production  Cardiovascular: denies CP, palpitations, edema  Gastrointestinal: denies nausea, vomiting, diarrhea, constipation, abdominal pain  Genitourinary: denies dysuria, frequency, urgency, hematuria   Skin/Breast: denies rash, itching  MSK: Positive for low back pain  Neurologic: denies headache, weakness, dizziness, paresthesias, numbness/tingling

## 2020-12-09 NOTE — ED PROVIDER NOTE - OBJECTIVE STATEMENT
Fell 2 weeks ago and 4am today.  pt stated he "slipped", but is a poor historian.  As per wife,  pt fell 2 weeks ago, complained right knee pain, had neg hip and knee xr five days ago.  pt complained tailbone pain and right hip pain.  Pt has not been able to get up since the fall.  pmlou- Lilliam

## 2020-12-09 NOTE — PHYSICAL THERAPY INITIAL EVALUATION ADULT - PERTINENT HX OF CURRENT PROBLEM, REHAB EVAL
86y/o M with PMH of HTN, HLD, Non obstructive CAD,BPH, and mild confusion (per wife) BIBEMS to [facility] ED s/p fall. earlier this a.m. Pt alsofell 2 weeks ago injuring his  right hip

## 2020-12-09 NOTE — H&P ADULT - PROBLEM SELECTOR PLAN 3
Hb 10.9 on admission, normocytic. Baseline appears to be 9. Required transfusions in last hospital stay (April 2020)  -Continue to trend H&H  -transfuse Hb >8

## 2020-12-09 NOTE — H&P ADULT - HISTORY OF PRESENT ILLNESS
84y/o M with PMH of HTN, HLD, CAD (s/p open heart surgery 5yrs ago), chronic pain (s/p knee and rt hip replacement), BPH, mild confusion (per wife) BIBEMS to [facility] s/p fall. Patient w/ recent admission in April for fall, found to have L hip fx s/p L hip hemiarthroplasty.      In the ED  VS- 97.1F, 92HR, 170/71, 16RR, O2 sat 100% on RA  Labs significant for Hb 10.9 (baseline 9), CMP WNL, UA positive for moderate blood and protein  XR b/l knee negative for acute fx or subluxation  XR b/l hip showed no acute fx, b/l hip hardware intact and unchanged  CXR grossly normal  CTH showed no acute findings  CT L spine showed ______  ECG showed _____  s/p 500cc NS in the ED 84y/o M with PMH of HTN, HLD, CAD (s/p open heart surgery 5yrs ago), chronic pain (s/p knee and rt hip replacement), BPH, and dementia BIBEMS for a fall. Patient w/ recent admission in April for fall, found to have L hip fx s/p L hip hemiarthroplasty. Patient is a poor historian 2/2 dementia. He states that 2 weeks ago he fell backwards onto his buttock in his garage. Since then, patient has been experiencing sacral/coccygeal pain. He states that he has been compensating and shifting most of his weight to his R leg since the fall and has developed posterior R knee pain that extends to his R ankle. Patient had xrays of his knees and hip 5 days ago which were negative for acute fx. This AM, patient states that he fell in his house while walking. Fall was unwitnessed, patient unable to recall how he fell. He was ambulating without the use of an assistive device (usually uses walker). 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 He states that he was able to pull himself off the ground afterwards. Patient states that he is currently feeling 2/10 pain in his tailbone. Pain is nonradiating and worse when sitting for extended periods of time. He states he has been experiencing this pain since his fall 2 weeks ago. He feels well otherwise and denies any HA, dizziness, CP, cough, SOB, sick contacts, abdominal pain, n/v, dysuria, or LE swelling.     In the ED  VS- 97.1F, 92HR, 170/71, 16RR, O2 sat 100% on RA  Labs significant for Hb 10.9 (baseline 9), CMP WNL, UA positive for moderate blood and protein  XR b/l knee negative for acute fx or subluxation  XR b/l hip showed no acute fx, b/l hip hardware intact and unchanged  CXR grossly normal  CTH showed no acute findings  CT L spine showed no acute fracture. (+) chronic lumbosacral degenerative changes. Stenosis in levels L2-L5, worse at L4-L5 level  ECG NSR, no signs of acute ischemia  s/p 500cc NS in the ED 86y/o M with PMH of HTN, HLD, AS (s/p TAVR 2015), chronic pain (s/p knee and rt hip replacement), BPH, and dementia BIBEMS for a fall. Patient w/ recent admission in April for fall, found to have L hip fx s/p L hip hemiarthroplasty. Patient is a poor historian 2/2 dementia. He states that 2 weeks ago he fell backwards onto his buttock in his garage. Since then, patient has been experiencing sacral/coccygeal pain. He states that he has been compensating and shifting most of his weight to his R leg since the fall and has developed posterior R knee pain that extends to his R ankle. Patient had xrays of his knees and hip 5 days ago which were negative for acute fx. This AM, patient states that he fell in his house while walking. Fall was unwitnessed, patient unable to recall how he fell. He was ambulating without the use of an assistive device (usually uses walker). 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 He states that he was able to pull himself off the ground afterwards. Patient states that he is currently feeling 2/10 pain in his tailbone. Pain is nonradiating and worse when sitting for extended periods of time. He states he has been experiencing this pain since his fall 2 weeks ago. He feels well otherwise and denies any HA, dizziness, CP, cough, SOB, sick contacts, abdominal pain, n/v, dysuria, or LE swelling.     In the ED  VS- 97.1F, 92HR, 170/71, 16RR, O2 sat 100% on RA  Labs significant for Hb 10.9 (baseline 9), CMP WNL, UA positive for moderate blood and protein  XR b/l knee negative for acute fx or subluxation  XR b/l hip showed no acute fx, b/l hip hardware intact and unchanged  CXR grossly normal  CTH showed no acute findings  CT L spine showed no acute fracture. (+) chronic lumbosacral degenerative changes. Stenosis in levels L2-L5, worse at L4-L5 level  ECG NSR, no signs of acute ischemia  s/p 500cc NS in the ED 86y/o M with PMH of HTN, HLD, AS (s/p TAVR 2015), chronic pain (s/p knee and rt hip replacement), BPH, and dementia BIBEMS for a fall. Patient w/ recent admission in April for fall, found to have L hip fx s/p L hip hemiarthroplasty. Patient is a poor historian 2/2 dementia. He states that 2 weeks ago he fell backwards onto his buttock in his garage. Since then, patient has been experiencing sacral/coccygeal pain. He states that he has been compensating and shifting most of his weight to his R leg since the fall and has developed posterior R knee pain that extends to his R ankle. Patient had xrays of his knees and hip 5 days ago which were negative for acute fx. This AM, patient states that he fell in his house while walking. Fall was unwitnessed, patient unable to recall how he fell. He was ambulating without the use of an assistive device (usually uses walker). 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 He states that he was able to pull himself off the ground afterwards. Patient states that he is currently feeling 2/10 pain in his tailbone. Pain is nonradiating and worse when sitting for extended periods of time. He states he has been experiencing this pain since his fall 2 weeks ago. He feels well otherwise and denies any HA, dizziness, CP, cough, SOB, sick contacts, abdominal pain, n/v, dysuria, or LE swelling.     In the ED  VS- 97.1F, 92HR, 170/71, 16RR, O2 sat 100% on RA  Labs significant for Hb 10.9 (baseline 9), CMP WNL, UA positive for moderate blood and protein  XR b/l knee negative for acute fx or subluxation  XR b/l hip showed no acute fx, b/l hip hardware intact and unchanged  CXR grossly normal  CTH showed no acute findings  CT L spine showed no acute fracture. (+) chronic lumbosacral degenerative changes. Stenosis in levels L2-L5, worse at L4-L5 level  ECG NSR, no signs of acute ischemia  s/p toradol, tylenol, lidoderm patch, and 500cc NS in the ED

## 2020-12-09 NOTE — H&P ADULT - ASSESSMENT
84y/o M with PMH of HTN, HLD, CAD (s/p open heart surgery 5yrs ago), chronic pain (s/p knee and rt hip replacement), BPH, mild confusion (per wife) BIBEMS to [facility] ED s/p fall. Patient w/ recent admission in April for fall, found to have L hip fx s/p L hip hemiarthroplasty admitted for fall

## 2020-12-09 NOTE — H&P ADULT - NSHPSOCIALHISTORY_GEN_ALL_CORE
Lives w/ wife.  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  Per wife, "easily confused."  Denies current or past tobacco use.  Infrequent etoh use.  CBD pills for pain. Lives w/ wife in ranch style house  Retired, owned a printing store.  Ambulates with walker  Per wife, "easily confused."    Denies current or past tobacco use.  Infrequent etoh use.  Denies any illicit drug use.  CBD pills for pain.

## 2020-12-09 NOTE — H&P ADULT - PROBLEM SELECTOR PLAN 6
Resume home statin Chronic  -previously on xarelto and metoprolol succinate, recently DCed Chronic  -previously on metoprolol succinate, recently DCed

## 2020-12-09 NOTE — PHYSICAL THERAPY INITIAL EVALUATION ADULT - RANGE OF MOTION EXAMINATION, REHAB EVAL
bilateral upper extremity ROM was WFL (within functional limits)/deficits as listed below/right LE with limited hip and knee flexion/bilateral lower extremity ROM was WFL (within functional limits)

## 2020-12-09 NOTE — H&P ADULT - PROBLEM SELECTOR PLAN 1
Patient w/ multiple recent falls, likely 2/2 deconditioning  -Recent hospitalization in April for L hip fx s/p hemiarthroplasty.  -Fall 2 weeks ago to buttock, c/o sacrococcygeal pain since. Mechanical fall this AM, no LOC or head trauma  -Admit to medicine   -CT with no acute findings  -XR b/l hip and knees showed no acute fx  -CT L spine negative for acute fx. (+) chronic lumbosacral degenerative changes. Stenosis in levels L2-L5, worse at L4-L5 level  -Pain control (tylenol/tramadol/tramadol for mild/mod/severe). Avoid dilaudid/oxycodone as patient noted to have severe agitation in past  -PT consult -- recommend ROD placement

## 2020-12-09 NOTE — H&P ADULT - NSICDXPASTMEDICALHX_GEN_ALL_CORE_FT
PAST MEDICAL HISTORY:  Anemia     BPH (benign prostatic hyperplasia)     Chronic pain s/p knee and hip replacements    Dementia     High cholesterol     Hypertension     Mild CAD Non Obstructive CAD    S/P AVR (aortic valve replacement)

## 2020-12-09 NOTE — H&P ADULT - GASTROINTESTINAL DETAILS
no bruit/no guarding/nontender/soft/no organomegaly/no distention/no rebound tenderness/no rigidity/normal/no masses palpable/bowel sounds normal

## 2020-12-09 NOTE — ED ADULT NURSE NOTE - NSIMPLEMENTINTERV_GEN_ALL_ED
Implemented All Fall with Harm Risk Interventions:  [place] to call system. Call bell, personal items and telephone within reach. Instruct patient to call for assistance. Room bathroom lighting operational. Non-slip footwear when patient is off stretcher. Physically safe environment: no spills, clutter or unnecessary equipment. Stretcher in lowest position, wheels locked, appropriate side rails in place. Provide visual cue, wrist band, yellow gown, etc. Monitor gait and stability. Monitor for mental status changes and reorient to person, place, and time. Review medications for side effects contributing to fall risk. Reinforce activity limits and safety measures with patient and family. Provide visual clues: red socks.

## 2020-12-09 NOTE — ED ADULT NURSE NOTE - PMH
Anemia    BPH (benign prostatic hyperplasia)    Chronic pain  s/p knee and hip replacements  High cholesterol    Hypertension    Mild CAD  Non Obstructive CAD  S/P AVR (aortic valve replacement)     Anemia    BPH (benign prostatic hyperplasia)    Chronic pain  s/p knee and hip replacements  Dementia    High cholesterol    Hypertension    Mild CAD  Non Obstructive CAD  S/P AVR (aortic valve replacement)

## 2020-12-09 NOTE — ED PROVIDER NOTE - PMH
Anemia    BPH (benign prostatic hyperplasia)    Chronic pain  s/p knee and hip replacements  High cholesterol    Hypertension    Mild CAD  Non Obstructive CAD  S/P AVR (aortic valve replacement)

## 2020-12-09 NOTE — H&P ADULT - ATTENDING COMMENTS
pt seen, examined  case & care plan d/w pt, residents at detail.  AM labs   PT eval, Social service Eval.  Palliative eval.

## 2020-12-09 NOTE — H&P ADULT - NEUROLOGICAL DETAILS
responds to verbal commands/Dementia, confusion/sensation intact/cranial nerves intact/normal strength

## 2020-12-09 NOTE — H&P ADULT - PROBLEM SELECTOR PLAN 5
Hx of chronic pain 2/2 b/l hip and R knee replacement  -On tramadol 50mg qd at home  -Pain control w/ tylenol and tramadol  -Lidocaine patch ordered  -Avoid dilaudid/oxycodone as patient noted to have severe agitation in past

## 2020-12-09 NOTE — H&P ADULT - NSHPOUTPATIENTPROVIDERS_GEN_ALL_CORE
PCP: Dr. Vyas  Cardio: Dr. Dewey  Uro: Dr. Underwood  Ortho: Dr. Gandhi, Dr. Morejon  Heme: Dr. Israel

## 2020-12-09 NOTE — ED ADULT NURSE NOTE - OBJECTIVE STATEMENT
Pt to ED via ambulance after fall at home.  Pt has some dementia per wife, can state name and birthday when prompted, but pt is poor historian.  Per wife, pt has had multiple falls at home recently and had unwitnessed fall eary this morning.  Wife found pt on floor around 0400, could not get pt up, waited a little while, and when pt did not improve and get off floor, wife called ambulance.  Pt c/o pain to coccyx area, especially with movement.  Pt has abrasion across left 5th toe. Pt to ED via ambulance after fall at home.  Pt has some dementia per wife, can state name and birthday when prompted, but pt is poor historian.  Per wife, pt has had multiple falls at home recently and had unwitnessed fall eary this morning.  Wife found pt on floor around 0400, could not get pt up, waited a little while, and when pt did not improve and get off floor, wife called ambulance.  Pt c/o pain to coccyx area, especially with movement.  Pt has abrasion across left 5th toe and to left knee.

## 2020-12-09 NOTE — H&P ADULT - NSICDXPASTSURGICALHX_GEN_ALL_CORE_FT
PAST SURGICAL HISTORY:  History of total hip replacement, right     History of total right knee replacement (TKR) X2    S/P AVR (aortic valve replacement)

## 2020-12-09 NOTE — ED PROVIDER NOTE - PROGRESS NOTE DETAILS
lab, xray and ct results dw pt's wife Kalani who is aware that he will be admitted [facility] Jin case dw Pedro Luis Jin

## 2020-12-09 NOTE — H&P ADULT - PROBLEM SELECTOR PLAN 2
Chronic  -Baseline AAOx2  -avoid sedative medications Chronic  -Baseline AAOx2. Wife notes worsening confusion over the past few months with episodes of agitation  -avoid sedative medications Chronic  -Baseline AAOx2. Wife notes worsening confusion and agitation over the past few months  -Neuro consulted, f/u recs  -avoid sedative medications Chronic  -Baseline AAOx2. Wife notes worsening confusion and agitation over the past few months  -Neuro, Dr. Jacobson consulted, f/u recs  -avoid sedative medications Chronic  -Baseline AAOx2. Wife notes worsening confusion and agitation over the past few months  -Neuro, Dr. Jansen consulted, f/u recs  -avoid sedative medications

## 2020-12-10 ENCOUNTER — TRANSCRIPTION ENCOUNTER (OUTPATIENT)
Age: 85
End: 2020-12-10

## 2020-12-10 LAB
ALBUMIN SERPL ELPH-MCNC: 3.1 G/DL — LOW (ref 3.3–5)
ALP SERPL-CCNC: 105 U/L — SIGNIFICANT CHANGE UP (ref 40–120)
ALT FLD-CCNC: 41 U/L — SIGNIFICANT CHANGE UP (ref 12–78)
ANION GAP SERPL CALC-SCNC: 10 MMOL/L — SIGNIFICANT CHANGE UP (ref 5–17)
AST SERPL-CCNC: 54 U/L — HIGH (ref 15–37)
BASOPHILS # BLD AUTO: 0.03 K/UL — SIGNIFICANT CHANGE UP (ref 0–0.2)
BASOPHILS NFR BLD AUTO: 0.4 % — SIGNIFICANT CHANGE UP (ref 0–2)
BILIRUB SERPL-MCNC: 0.6 MG/DL — SIGNIFICANT CHANGE UP (ref 0.2–1.2)
BUN SERPL-MCNC: 24 MG/DL — HIGH (ref 7–23)
CALCIUM SERPL-MCNC: 9.1 MG/DL — SIGNIFICANT CHANGE UP (ref 8.5–10.1)
CHLORIDE SERPL-SCNC: 104 MMOL/L — SIGNIFICANT CHANGE UP (ref 96–108)
CHOLEST SERPL-MCNC: 145 MG/DL — SIGNIFICANT CHANGE UP
CO2 SERPL-SCNC: 26 MMOL/L — SIGNIFICANT CHANGE UP (ref 22–31)
CREAT SERPL-MCNC: 1.1 MG/DL — SIGNIFICANT CHANGE UP (ref 0.5–1.3)
EOSINOPHIL # BLD AUTO: 0.1 K/UL — SIGNIFICANT CHANGE UP (ref 0–0.5)
EOSINOPHIL NFR BLD AUTO: 1.4 % — SIGNIFICANT CHANGE UP (ref 0–6)
GLUCOSE SERPL-MCNC: 109 MG/DL — HIGH (ref 70–99)
HCT VFR BLD CALC: 34 % — LOW (ref 39–50)
HDLC SERPL-MCNC: 63 MG/DL — SIGNIFICANT CHANGE UP
HGB BLD-MCNC: 10.9 G/DL — LOW (ref 13–17)
IMM GRANULOCYTES NFR BLD AUTO: 0.3 % — SIGNIFICANT CHANGE UP (ref 0–1.5)
LIPID PNL WITH DIRECT LDL SERPL: 71 MG/DL — SIGNIFICANT CHANGE UP
LYMPHOCYTES # BLD AUTO: 0.94 K/UL — LOW (ref 1–3.3)
LYMPHOCYTES # BLD AUTO: 13.1 % — SIGNIFICANT CHANGE UP (ref 13–44)
MAGNESIUM SERPL-MCNC: 2.1 MG/DL — SIGNIFICANT CHANGE UP (ref 1.6–2.6)
MCHC RBC-ENTMCNC: 29.3 PG — SIGNIFICANT CHANGE UP (ref 27–34)
MCHC RBC-ENTMCNC: 32.1 GM/DL — SIGNIFICANT CHANGE UP (ref 32–36)
MCV RBC AUTO: 91.4 FL — SIGNIFICANT CHANGE UP (ref 80–100)
MONOCYTES # BLD AUTO: 0.62 K/UL — SIGNIFICANT CHANGE UP (ref 0–0.9)
MONOCYTES NFR BLD AUTO: 8.6 % — SIGNIFICANT CHANGE UP (ref 2–14)
NEUTROPHILS # BLD AUTO: 5.47 K/UL — SIGNIFICANT CHANGE UP (ref 1.8–7.4)
NEUTROPHILS NFR BLD AUTO: 76.2 % — SIGNIFICANT CHANGE UP (ref 43–77)
NON HDL CHOLESTEROL: 82 MG/DL — SIGNIFICANT CHANGE UP
NRBC # BLD: 0 /100 WBCS — SIGNIFICANT CHANGE UP (ref 0–0)
PHOSPHATE SERPL-MCNC: 2.5 MG/DL — SIGNIFICANT CHANGE UP (ref 2.5–4.5)
PLATELET # BLD AUTO: 245 K/UL — SIGNIFICANT CHANGE UP (ref 150–400)
POTASSIUM SERPL-MCNC: 3.5 MMOL/L — SIGNIFICANT CHANGE UP (ref 3.5–5.3)
POTASSIUM SERPL-SCNC: 3.5 MMOL/L — SIGNIFICANT CHANGE UP (ref 3.5–5.3)
PROT SERPL-MCNC: 6.8 G/DL — SIGNIFICANT CHANGE UP (ref 6–8.3)
RBC # BLD: 3.72 M/UL — LOW (ref 4.2–5.8)
RBC # FLD: 11.5 % — SIGNIFICANT CHANGE UP (ref 10.3–14.5)
SODIUM SERPL-SCNC: 140 MMOL/L — SIGNIFICANT CHANGE UP (ref 135–145)
TRIGL SERPL-MCNC: 53 MG/DL — SIGNIFICANT CHANGE UP
WBC # BLD: 7.18 K/UL — SIGNIFICANT CHANGE UP (ref 3.8–10.5)
WBC # FLD AUTO: 7.18 K/UL — SIGNIFICANT CHANGE UP (ref 3.8–10.5)

## 2020-12-10 PROCEDURE — 73502 X-RAY EXAM HIP UNI 2-3 VIEWS: CPT | Mod: 26,LT

## 2020-12-10 PROCEDURE — 99497 ADVNCD CARE PLAN 30 MIN: CPT

## 2020-12-10 RX ORDER — TRAMADOL HYDROCHLORIDE 50 MG/1
50 TABLET ORAL EVERY 6 HOURS
Refills: 0 | Status: DISCONTINUED | OUTPATIENT
Start: 2020-12-10 | End: 2020-12-11

## 2020-12-10 RX ORDER — ASCORBIC ACID 60 MG
500 TABLET,CHEWABLE ORAL
Refills: 0 | Status: DISCONTINUED | OUTPATIENT
Start: 2020-12-10 | End: 2020-12-11

## 2020-12-10 RX ORDER — TRAMADOL HYDROCHLORIDE 50 MG/1
25 TABLET ORAL EVERY 6 HOURS
Refills: 0 | Status: DISCONTINUED | OUTPATIENT
Start: 2020-12-10 | End: 2020-12-11

## 2020-12-10 RX ORDER — QUETIAPINE FUMARATE 200 MG/1
12.5 TABLET, FILM COATED ORAL AT BEDTIME
Refills: 0 | Status: DISCONTINUED | OUTPATIENT
Start: 2020-12-10 | End: 2020-12-11

## 2020-12-10 RX ORDER — OXYCODONE HYDROCHLORIDE 5 MG/1
510 TABLET ORAL EVERY 6 HOURS
Refills: 0 | Status: DISCONTINUED | OUTPATIENT
Start: 2020-12-10 | End: 2020-12-10

## 2020-12-10 RX ORDER — TRAMADOL HYDROCHLORIDE 50 MG/1
75 TABLET ORAL ONCE
Refills: 0 | Status: DISCONTINUED | OUTPATIENT
Start: 2020-12-10 | End: 2020-12-10

## 2020-12-10 RX ORDER — POTASSIUM CHLORIDE 20 MEQ
40 PACKET (EA) ORAL ONCE
Refills: 0 | Status: COMPLETED | OUTPATIENT
Start: 2020-12-10 | End: 2020-12-10

## 2020-12-10 RX ORDER — LIDOCAINE 4 G/100G
1 CREAM TOPICAL DAILY
Refills: 0 | Status: DISCONTINUED | OUTPATIENT
Start: 2020-12-10 | End: 2020-12-11

## 2020-12-10 RX ORDER — ACETAMINOPHEN 500 MG
650 TABLET ORAL EVERY 12 HOURS
Refills: 0 | Status: DISCONTINUED | OUTPATIENT
Start: 2020-12-10 | End: 2020-12-11

## 2020-12-10 RX ORDER — MULTIVIT-MIN/FERROUS GLUCONATE 9 MG/15 ML
1 LIQUID (ML) ORAL DAILY
Refills: 0 | Status: DISCONTINUED | OUTPATIENT
Start: 2020-12-10 | End: 2020-12-11

## 2020-12-10 RX ORDER — OXYCODONE HYDROCHLORIDE 5 MG/1
5 TABLET ORAL EVERY 6 HOURS
Refills: 0 | Status: DISCONTINUED | OUTPATIENT
Start: 2020-12-10 | End: 2020-12-11

## 2020-12-10 RX ADMIN — Medication 4 GRAM(S): at 12:13

## 2020-12-10 RX ADMIN — FINASTERIDE 5 MILLIGRAM(S): 5 TABLET, FILM COATED ORAL at 12:12

## 2020-12-10 RX ADMIN — ENOXAPARIN SODIUM 40 MILLIGRAM(S): 100 INJECTION SUBCUTANEOUS at 12:13

## 2020-12-10 RX ADMIN — LIDOCAINE 1 PATCH: 4 CREAM TOPICAL at 21:15

## 2020-12-10 RX ADMIN — Medication 10 MILLIGRAM(S): at 11:11

## 2020-12-10 RX ADMIN — TRAMADOL HYDROCHLORIDE 75 MILLIGRAM(S): 50 TABLET ORAL at 09:00

## 2020-12-10 RX ADMIN — TRAMADOL HYDROCHLORIDE 75 MILLIGRAM(S): 50 TABLET ORAL at 10:00

## 2020-12-10 RX ADMIN — ESCITALOPRAM OXALATE 20 MILLIGRAM(S): 10 TABLET, FILM COATED ORAL at 12:12

## 2020-12-10 RX ADMIN — POLYETHYLENE GLYCOL 3350 17 GRAM(S): 17 POWDER, FOR SOLUTION ORAL at 05:16

## 2020-12-10 RX ADMIN — Medication 2000 UNIT(S): at 12:12

## 2020-12-10 RX ADMIN — Medication 650 MILLIGRAM(S): at 18:11

## 2020-12-10 RX ADMIN — Medication 40 MILLIEQUIVALENT(S): at 18:11

## 2020-12-10 RX ADMIN — POLYETHYLENE GLYCOL 3350 17 GRAM(S): 17 POWDER, FOR SOLUTION ORAL at 18:11

## 2020-12-10 RX ADMIN — LIDOCAINE 1 PATCH: 4 CREAM TOPICAL at 04:00

## 2020-12-10 RX ADMIN — Medication 500 MILLIGRAM(S): at 18:11

## 2020-12-10 NOTE — GOALS OF CARE CONVERSATION - ADVANCED CARE PLANNING - CONVERSATION DETAILS
Writer spoke with pt wife Reviewed patient's medical and social history as well as events leading to patient's hospitalization. Wife states that pt condition has worsened over past two months becoming more confused and agitated. Writer discussed patient's current diagnosis dorsalgia and 2 falls. medical condition and management and prognosis ,pt wife understands limited life expectancy due to dementia . Inquired about patient's wishes regarding extent of medical care to be provided including escalation of medical care into the ICU and use of intubation /ventilator. In addition, the writer inquired about thoughts regarding cardiopulmonary resuscitation, artificial nutrition and hydration including use of feeding tubes and IVF, antibiotics, .Pt wife states she does not want extraordinary measures taken to prolong his life but does want standard medical treatment to improve his condition if possible. . Kalani shows good insight into medical condition. All questions answered. Writer recommended execution od advance directives Patients  wife consented to DNR/DNIi  status. MOLST form filled out and placed in chart. Psychosocial support provided.

## 2020-12-10 NOTE — PROGRESS NOTE ADULT - PROBLEM SELECTOR PLAN 3
Hb 10.9 on admission, normocytic. Baseline appears to be 9. Required transfusions in last hospital stay (April 2020)  -Continue to trend H&H  -transfuse Hb >8 Hb 10.9 on admission, normocytic.   -Baseline appears to be 9. Required transfusions in last hospital stay (April 2020)  -Continue to trend H&H  -transfuse Hb >8

## 2020-12-10 NOTE — DISCHARGE NOTE PROVIDER - NSDCMRMEDTOKEN_GEN_ALL_CORE_FT
atorvastatin 10 mg oral tablet: 1 tab(s) orally once a day  escitalopram 20 mg oral tablet: 1 tab(s) orally once a day  finasteride 5 mg oral tablet: 1 tab(s) orally once a day  mometasone 0.1% topical solution: Apply to Affected Area once a day   Omega-3 oral capsule: 1 cap(s) orally once a day  polyethylene glycol 3350 oral powder for reconstitution: 17 gram(s) orally 2 times a day  traMADol 50 mg oral tablet: 1 tab(s) orally every 8 hours, As Needed for pain  Vitamin D3 2000 intl units (50 mcg) oral tablet: 1 tab(s) orally once a day   acetaminophen 325 mg oral tablet: 2 tab(s) orally every 12 hours  ascorbic acid 500 mg oral tablet: 1 tab(s) orally 2 times a day  atorvastatin 10 mg oral tablet: 1 tab(s) orally once a day  bisacodyl 10 mg rectal suppository: 1 suppository(ies) rectal once a day, As needed, Constipation  enoxaparin: 40 milligram(s) subcutaneous once a day  escitalopram 20 mg oral tablet: 1 tab(s) orally once a day  finasteride 5 mg oral tablet: 1 tab(s) orally once a day  gabapentin 300 mg oral capsule: 1 cap(s) orally once a day (at bedtime)  lidocaine 5% topical film:  topically to low back  lidocaine 5% topical film:  topically to R knee  mometasone 0.1% topical solution: Apply to Affected Area once a day   Multiple Vitamins with Minerals oral tablet: 1 tab(s) orally once a day  Omega-3 oral capsule: 1 cap(s) orally once a day  oxyCODONE 5 mg oral tablet: 1 tab(s) orally every 6 hours, As needed, Severe Pain (7 - 10)  polyethylene glycol 3350 oral powder for reconstitution: 17 gram(s) orally 2 times a day  QUEtiapine: 12.5 milligram(s) orally once a day (at bedtime), As Needed  senna oral tablet: 2 tab(s) orally once a day (at bedtime)  traMADol 50 mg oral tablet: 0.5 tab(s) orally every 6 hours, As needed, Mild Pain (1 - 3)  traMADol 50 mg oral tablet: 1 tab(s) orally every 6 hours, As needed, Moderate Pain (4 - 6)  Vitamin D3 2000 intl units (50 mcg) oral tablet: 1 tab(s) orally once a day   acetaminophen 325 mg oral tablet: 2 tab(s) orally every 12 hours  ascorbic acid 500 mg oral tablet: 1 tab(s) orally 2 times a day  atorvastatin 10 mg oral tablet: 1 tab(s) orally once a day  bisacodyl 10 mg rectal suppository: 1 suppository(ies) rectal once a day, As needed, Constipation  enoxaparin: 40 milligram(s) subcutaneous once a day  escitalopram 20 mg oral tablet: 1 tab(s) orally once a day  finasteride 5 mg oral tablet: 1 tab(s) orally once a day  gabapentin 300 mg oral capsule: 1 cap(s) orally once a day (at bedtime)  lidocaine 5% topical film:  topically to low back  lidocaine 5% topical film:  topically to R knee  mometasone 0.1% topical solution: Apply to Affected Area once a day   Multiple Vitamins with Minerals oral tablet: 1 tab(s) orally once a day  Omega-3 oral capsule: 1 cap(s) orally once a day  oxyCODONE 5 mg oral tablet: 1 tab(s) orally every 6 hours, As needed, Severe Pain (7 - 10)  polyethylene glycol 3350 oral powder for reconstitution: 17 gram(s) orally 2 times a day  QUEtiapine: 12.5 milligram(s) orally once a day (at bedtime), As Needed  senna oral tablet: 2 tab(s) orally once a day (at bedtime)  tamsulosin 0.4 mg oral capsule: 1 cap(s) orally once a day (at bedtime)  traMADol 50 mg oral tablet: 0.5 tab(s) orally every 6 hours, As needed, Mild Pain (1 - 3)  traMADol 50 mg oral tablet: 1 tab(s) orally every 6 hours, As needed, Moderate Pain (4 - 6)  Vitamin D3 2000 intl units (50 mcg) oral tablet: 1 tab(s) orally once a day   acetaminophen 325 mg oral tablet: 2 tab(s) orally every 12 hours  ascorbic acid 500 mg oral tablet: 1 tab(s) orally 2 times a day  atorvastatin 10 mg oral tablet: 1 tab(s) orally once a day  bisacodyl 10 mg rectal suppository: 1 suppository(ies) rectal once a day  enoxaparin: 40 milligram(s) subcutaneous once a day  escitalopram 20 mg oral tablet: 1 tab(s) orally once a day  finasteride 5 mg oral tablet: 1 tab(s) orally once a day  gabapentin 300 mg oral capsule: 1 cap(s) orally once a day (at bedtime)  lidocaine 5% topical film: Apply topically to affected area once a day  Apply daily at 9 AM - 9 PM , 12 hrs on , 12 hrs off  lidocaine 5% topical film: Apply topically to affected area once a day  Apply daily at 9 AM -9 PM   daily 12 hrs on, 12 hrs off.  mometasone 0.1% topical solution: Apply to Affected Area once a day   Multiple Vitamins with Minerals oral tablet: 1 tab(s) orally once a day  Omega-3 oral capsule: 1 cap(s) orally once a day  oxyCODONE 5 mg oral tablet: 1 tab(s) orally every 6 hours, As needed, Severe Pain (7 - 10)  polyethylene glycol 3350 oral powder for reconstitution: 17 gram(s) orally 2 times a day  QUEtiapine: 12.5 milligram(s) orally once a day (at bedtime), As Needed  senna oral tablet: 2 tab(s) orally once a day (at bedtime)  tamsulosin 0.4 mg oral capsule: 1 cap(s) orally once a day (at bedtime)  traMADol 50 mg oral tablet: 0.5 tab(s) orally every 6 hours, As needed, Mild Pain (1 - 3)  traMADol 50 mg oral tablet: 1 tab(s) orally every 6 hours, As needed, Moderate Pain (4 - 6)  Vitamin D3 2000 intl units (50 mcg) oral tablet: 1 tab(s) orally once a day

## 2020-12-10 NOTE — DISCHARGE NOTE PROVIDER - PROVIDER TOKENS
PROVIDER:[TOKEN:[[id number]],FOLLOWUP:[Routine],ESTABLISHEDPATIENT:[T]],PROVIDER:[[id number]],FOLLOWUP:[Routine],ESTABLISHEDPATIENT:[T]],FREE:[LAST:[Zuleima],FIRST:[Omkar],PHONE:[phone]:[(   )    -],ADDRESS:[[address] [place] [address]],FOLLOWUP:[Routine],ESTABLISHEDPATIENT:[T]] PROVIDER:[TOKEN:[[id number]],FOLLOWUP:[Routine],ESTABLISHEDPATIENT:[T]],PROVIDER:[TOKEN:[[id number]],FOLLOWUP:[Routine],ESTABLISHEDPATIENT:[T]],FREE:[LAST:[Zuleima],FIRST:[mOkar],PHONE:[phone]:[(   )    -],ADDRESS:[[address] [place] [address]],FOLLOWUP:[Routine],ESTABLISHEDPATIENT:[T]],PROVIDER:[TOKEN:[[id number]]] PROVIDER:[TOKEN:[[id number]],FOLLOWUP:[Routine],ESTABLISHEDPATIENT:[T]],PROVIDER:[TOKEN:[[id number]],FOLLOWUP:[1 week],ESTABLISHEDPATIENT:[T]],PROVIDER:[TOKEN:[[id number]]],FREE:[LAST:[Zuleima],FIRST:[Omkar],PHONE:[phone]:[(   )    -],ADDRESS:[[address] [place] [address]],FOLLOWUP:[Routine],ESTABLISHEDPATIENT:[T]] PROVIDER:[TOKEN:[[id number]],FOLLOWUP:[Routine],ESTABLISHEDPATIENT:[T]],PROVIDER:[TOKEN:[[id number]],FOLLOWUP:[1 week],ESTABLISHEDPATIENT:[T]],PROVIDER:[TOKEN:[[id number]]],FREE:[LAST:[Zuleima],FIRST:[Omkar],PHONE:[phone]:[(   )    -],ADDRESS:[[address] [place] [address]],FOLLOWUP:[Routine],ESTABLISHEDPATIENT:[T]],PROVIDER:[TOKEN:[[id number]]]

## 2020-12-10 NOTE — DISCHARGE NOTE PROVIDER - CARE PROVIDERS DIRECT ADDRESSES
,DirectAddress_Unknown,[email or web address],DirectAddress_Unknown ,DirectAddress_Unknown,[email or web address],DirectAddress_Unknown,DirectAddress_Unknown ,DirectAddress_Unknown,[email or web address],DirectAddress_Unknown,DirectAddress_Unknown,DirectAddress_Unknown

## 2020-12-10 NOTE — DIETITIAN INITIAL EVALUATION ADULT. - OTHER INFO
Pt awake/confused at time of visit; currently using commode. Patient noted to be poor historian secondary to dementia. Dash/TLC diet rx. Per RN only fair po intake for breakfast. GI wdl, fecal incontinence noted. Per admission profile stage II to right lateral great toe and stage III to left sole foot near 5th digit. Flowsheets reveal only abrasions to left 5th toe, knee and elbow. Pt awake/confused at time of visit; currently using commode. Patient noted to be poor historian secondary to dementia. Dash/TLC diet rx. Per RN & RN aide only fair po intake for breakfast & lunch today. GI wdl, fecal incontinence noted. Stool softeners/laxatives rx. Per admission profile stage II to right lateral great toe and stage III to left sole foot near 5th digit. Flowsheets reveal only abrasions to left 5th toe, knee and elbow. Per MOLST pt DNR/DNI/No Tube Feeding. Pt awake/confused at time of visit; currently using commode. Patient noted to be poor historian secondary to dementia. Dash/TLC diet rx. Per RN & RN aide only fair po intake for breakfast & lunch today. GI wdl, fecal incontinence noted. +Constipation. Stool softeners/laxatives rx. Per admission profile stage II to right lateral great toe and stage III to left sole foot near 5th digit. Flowsheets reveal only abrasions to left 5th toe, knee and elbow. Per MOLST pt DNR/DNI/No Tube Feeding.    Per telephone conversation with pts wife Kalani (emergency contact) pt has recently had a decrease in appetite/po intake due to depression/dementia. No significant weight loss reported, however may have lost a few lbs per pts wife. No report difficulty chewing/swallowing.  Food preferences obtained through wife to maximize po intake. Agreeable to ensure. Severe constipation reported pta. Takes miralax TID and enemas as needed. Decreased fluid intake plus pain medications contributing to constipation. Encourage po fluids/dietary fiber as tolerated.

## 2020-12-10 NOTE — DISCHARGE NOTE PROVIDER - NSDCCPCAREPLAN_GEN_ALL_CORE_FT
PRINCIPAL DISCHARGE DIAGNOSIS  Diagnosis: Fall at home  Assessment and Plan of Treatment: You were admitted to the hospital for further evaluation of your back pain and recurrent falls at home  -X rays and CT scans of the back and hips were performed which did not show any acute fractures or dislocations but did show chronic degenerative changes (arthritis).   -You were evaluated by Physical Therapy and due to having suboptimal balance and strength, were recommended to go to subacute rehab prior to returning home to build up strength  -Follow up with Dr Gandhi (Orthopedics) upon discharge      SECONDARY DISCHARGE DIAGNOSES  Diagnosis: BPH (benign prostatic hyperplasia)  Assessment and Plan of Treatment: For your enlarged prostate, CONTINUE finasteride 5mg daily  -Follow with your urologist per routine (Dr Underwood)    Diagnosis: Anxiety  Assessment and Plan of Treatment: CONTINUE escitalopram 20mg daily    Diagnosis: S/P AVR (aortic valve replacement)  Assessment and Plan of Treatment: You have a history of aortic valve replacement  -follow with your cardiologist per routine (Dr Dewey)    Diagnosis: Chronic pain  Assessment and Plan of Treatment: Your chronic back pain is due to multiple factors including arthritic changes to the bones that normally occur as we age, as well narrowing of the spinal canal  -CONTINUE Tramadol 50mg every 8 hours as needed for back pain  -CONTINUE Miralax twice a day as tramadol and other pain medications can worsen constipation     PRINCIPAL DISCHARGE DIAGNOSIS  Diagnosis: Fall at home  Assessment and Plan of Treatment: You were admitted to the hospital for further evaluation of your back pain and recurrent falls at home  -X rays and CT scans of the back and hips were performed which did not show any acute fractures or dislocations but did show chronic degenerative changes (arthritis).   -You were evaluated by Physical Therapy and due to having suboptimal balance and strength, were recommended to go to subacute rehab prior to returning home to build up strength  -Follow up with Dr Gandhi (Orthopedics) upon discharge      SECONDARY DISCHARGE DIAGNOSES  Diagnosis: BPH (benign prostatic hyperplasia)  Assessment and Plan of Treatment: For your enlarged prostate, CONTINUE finasteride 5mg daily  -Follow with your urologist per routine (Dr Underwood)    Diagnosis: Chronic pain  Assessment and Plan of Treatment: Your chronic back pain is due to multiple factors including arthritic changes to the bones that normally occur as we age, as well narrowing of the spinal canal  -CONTINUE Tramadol 50mg every 8 hours as needed for back pain  -You were started on gabapentin 300mg at night time for neuropathic pain. Continue this medication and follow up with your primary care doctor.  -CONTINUE Miralax twice a day as tramadol and other pain medications can worsen constipation    Diagnosis: Anxiety  Assessment and Plan of Treatment: CONTINUE escitalopram 20mg daily  You were evaluated by neurology, Dr. Garcia during your hospital stay, who added on Seroquel 12.5mg at night time AS NEEDED for confusion/dementia.    Diagnosis: S/P AVR (aortic valve replacement)  Assessment and Plan of Treatment: You have a history of aortic valve replacement  -follow with your cardiologist per routine (Dr Dewey)     PRINCIPAL DISCHARGE DIAGNOSIS  Diagnosis: Fall at home  Assessment and Plan of Treatment: You were admitted to the hospital for further evaluation of your back pain and recurrent falls at home  -X rays and CT scans of the back and hips were performed which did not show any acute fractures or dislocations but did show chronic degenerative changes (arthritis).   -You were evaluated by Physical Therapy and due to having suboptimal balance and strength, were recommended to go to subacute rehab prior to returning home to build up strength  -CONTINUE lidocaine pain patches to R KNEE and LOWER BACK  -Follow up with Dr Gandhi (Orthopedics) upon discharge      SECONDARY DISCHARGE DIAGNOSES  Diagnosis: BPH (benign prostatic hyperplasia)  Assessment and Plan of Treatment: For your enlarged prostate, CONTINUE finasteride 5mg daily  -CONTINUE Tamsulosin 0.4mg daily  -Follow with your urologist per routine (Dr Underwood)    Diagnosis: Anxiety  Assessment and Plan of Treatment: CONTINUE escitalopram 20mg daily  You were evaluated by neurology, Dr. Garcia during your hospital stay, who added on Seroquel 12.5mg at night time AS NEEDED for confusion/dementia.    Diagnosis: S/P AVR (aortic valve replacement)  Assessment and Plan of Treatment: You have a history of aortic valve replacement  -follow with your cardiologist per routine (Dr Dewey)    Diagnosis: H/O urinary retention  Assessment and Plan of Treatment: For your urinary retention, this was likely a result of significant constipation  -BLADDER SCAN MUST BE PERFORMED REGULARLY TO MONITOR FOR URINARY RETENTION  -You were able to pass a bowel movement after you were treated with laxatives and enema  -CONTINUE on a bowel regimen (miralax twice a day) to keep from becoming severely constipated  -CONTINUE Finasteride 5mg daily  -Follow with Dr Underwood (urology) within 1 week of discharge    Diagnosis: Dementia  Assessment and Plan of Treatment: For your history of dementia, you were noted to become agitated which occurred often at night time  -You were seen and evaluated by a neurologist and started on a medication on an as needed basis to help with agitation  -START Quetiapine 12.5mg every night as needed for agitation    Diagnosis: Chronic pain  Assessment and Plan of Treatment: Your chronic back pain is due to multiple factors including arthritic changes to the bones that normally occur as we age, as well narrowing of the spinal canal  -CONTINUE Tramadol 50mg every 8 hours as needed for back pain  -You were started on gabapentin 300mg at night time for neuropathic pain. Continue this medication and follow up with your primary care doctor.  -CONTINUE Miralax twice a day as tramadol and other pain medications can worsen constipation     PRINCIPAL DISCHARGE DIAGNOSIS  Diagnosis: Fall at home  Assessment and Plan of Treatment: You were admitted to the hospital for further evaluation of your back pain and recurrent falls at home  -CT Head- NEG  -X rays and CT L spine scans of the back and hips were performed which did not show any acute fractures or dislocations but did show chronic degenerative changes (arthritis).   -You were evaluated by Physical Therapy and due to having suboptimal balance and strength, were recommended to go to subacute rehab prior to returning home to build up strength  -CONTINUE lidocaine pain patches to R KNEE and LOWER BACK  -Follow up with Dr Gandhi (Orthopedics) upon discharge      SECONDARY DISCHARGE DIAGNOSES  Diagnosis: Dementia  Assessment and Plan of Treatment: For your history of dementia, you were noted to become agitated which occurred often at night time  -You were seen and evaluated by a neurologist and started on a medication on an as needed basis to help with agitation  -START Quetiapine 12.5mg every night as needed for agitation    Diagnosis: H/O urinary retention  Assessment and Plan of Treatment: For your urinary retention, this was likely a result of significant constipation  -BLADDER SCAN MUST BE PERFORMED REGULARLY TO MONITOR FOR URINARY RETENTION  -You were able to pass a bowel movement after you were treated with laxatives and enema  -CONTINUE on a bowel regimen (miralax twice a day) to keep from becoming severely constipated  -CONTINUE Finasteride 5mg daily  -Follow with Dr Underwood (urology) within 1 week of discharge    Diagnosis: BPH (benign prostatic hyperplasia)  Assessment and Plan of Treatment: For your enlarged prostate, CONTINUE finasteride 5mg daily  -CONTINUE Tamsulosin 0.4mg daily  -Follow with your urologist per routine (Dr Underwood)    Diagnosis: Chronic pain  Assessment and Plan of Treatment: Your chronic back pain is due to multiple factors including arthritic changes to the bones that normally occur as we age, as well narrowing of the spinal canal  -CONTINUE Tramadol 50mg every 8 hours as needed for back pain  -You were started on gabapentin 300mg at night time for neuropathic pain. Continue this medication and follow up with your primary care doctor.  -CONTINUE Miralax twice a day as tramadol and other pain medications can worsen constipation    Diagnosis: Anxiety  Assessment and Plan of Treatment: CONTINUE escitalopram 20mg daily  You were evaluated by neurology, Dr. Garcia during your hospital stay, who added on Seroquel 12.5mg at night time AS NEEDED for confusion/dementia.    Diagnosis: High cholesterol  Assessment and Plan of Treatment: on Statin    Diagnosis: S/P AVR (aortic valve replacement)  Assessment and Plan of Treatment: You have a history of aortic valve replacement  -follow with your cardiologist per routine (Dr Dewey)

## 2020-12-10 NOTE — PROGRESS NOTE ADULT - ATTENDING COMMENTS
pt seen, examined, case & care plan d/w pt, residents at detail.  AM labs  PT eval-----> ROD, Social service eval  po diet, pt seen, examined, case & care plan d/w pt, residents at detail.  AM labs  PT eval-----> ROD, Social service eval  D/W wife at detail, OK to give strong pain meds, pt is DNR/DNI

## 2020-12-10 NOTE — DISCHARGE NOTE PROVIDER - NSDCACTIVITY_GEN_ALL_CORE
Do not make important decisions/No heavy lifting/straining/Do not drive or operate machinery Do not make important decisions/Bathing allowed/Walking - Indoors allowed/No heavy lifting/straining/Do not drive or operate machinery

## 2020-12-10 NOTE — PROGRESS NOTE ADULT - PROBLEM SELECTOR PLAN 2
Chronic  -Baseline AAOx2. Wife notes worsening confusion and agitation over the past few months  -Neuro, Dr. Jansen consulted, f/u recs  -avoid sedative medications Chronic  -Baseline AAOx2. Wife notes worsening confusion and agitation over the past few months  -Neuro, Dr. Jansen consulted, f/u recs- CT head- neg  -avoid sedative medications

## 2020-12-10 NOTE — PROGRESS NOTE ADULT - SUBJECTIVE AND OBJECTIVE BOX
Patient is a 85y old  Male who presents with a chief complaint of Fall (10 Dec 2020 08:03)    HPI:  86y/o M with PMH of HTN, HLD, AS (s/p TAVR ), chronic pain (s/p knee and rt hip replacement), BPH, and dementia BIBEMS for a fall. Patient w/ recent admission in April for fall, found to have L hip fx s/p L hip hemiarthroplasty. Patient is a poor historian 2/2 dementia. He states that 2 weeks ago he fell backwards onto his buttock in his garage. Since then, patient has been experiencing sacral/coccygeal pain. He states that he has been compensating and shifting most of his weight to his R leg since the fall and has developed posterior R knee pain that extends to his R ankle. Patient had xrays of his knees and hip 5 days ago which were negative for acute fx. This AM, patient states that he fell in his house while walking. Fall was unwitnessed, patient unable to recall how he fell. He was ambulating without the use of an assistive device (usually uses walker). 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 He states that he was able to pull himself off the ground afterwards. Patient states that he is currently feeling 2/10 pain in his tailbone. Pain is nonradiating and worse when sitting for extended periods of time. He states he has been experiencing this pain since his fall 2 weeks ago. He feels well otherwise and denies any HA, dizziness, CP, cough, SOB, sick contacts, abdominal pain, n/v, dysuria, or LE swelling.     In the ED  VS- 97.1F, 92HR, 170/71, 16RR, O2 sat 100% on RA  Labs significant for Hb 10.9 (baseline 9), CMP WNL, UA positive for moderate blood and protein  XR b/l knee negative for acute fx or subluxation  XR b/l hip showed no acute fx, b/l hip hardware intact and unchanged  CXR grossly normal  CTH showed no acute findings  CT L spine showed no acute fracture. (+) chronic lumbosacral degenerative changes. Stenosis in levels L2-L5, worse at L4-L5 level  ECG NSR, no signs of acute ischemia  s/p toradol, tylenol, lidoderm patch, and 500cc NS in the ED (09 Dec 2020 15:41)    INTERVAL HPI:  12/10: Patient seen and examined at bedside. No events overnight. Patient on bedpan leaning on R side. After helping him off bedpan he complained of L posterior thigh pain and "10/10" R knee pain. (-) homans sign b/l. R knee full ROM, nontender to palpation. Ordered tramadol 75mg x1. Patient denies any HA, dizziness, CP, SOB, n/v, or abdominal pain.      Home Medications:  atorvastatin 10 mg oral tablet: 1 tab(s) orally once a day (09 Dec 2020 16:50)  escitalopram 20 mg oral tablet: 1 tab(s) orally once a day (09 Dec 2020 16:50)  finasteride 5 mg oral tablet: 1 tab(s) orally once a day (09 Dec 2020 16:50)  mometasone 0.1% topical solution: Apply to Affected Area once a day  (09 Dec 2020 16:50)  Omega-3 oral capsule: 1 cap(s) orally once a day (09 Dec 2020 16:50)  polyethylene glycol 3350 oral powder for reconstitution: 17 gram(s) orally 2 times a day (09 Dec 2020 16:50)  traMADol 50 mg oral tablet: 1 tab(s) orally every 8 hours, As Needed for pain (09 Dec 2020 16:50)  Vitamin D3 2000 intl units (50 mcg) oral tablet: 1 tab(s) orally once a day (09 Dec 2020 16:50)      MEDICATIONS  (STANDING):  atorvastatin 10 milliGRAM(s) Oral at bedtime  cholecalciferol 2000 Unit(s) Oral daily  enoxaparin Injectable 40 milliGRAM(s) SubCutaneous daily  escitalopram 20 milliGRAM(s) Oral daily  finasteride 5 milliGRAM(s) Oral daily  omega-3-Acid Ethyl Esters 4 Gram(s) Oral daily  polyethylene glycol 3350 17 Gram(s) Oral two times a day  senna 2 Tablet(s) Oral at bedtime    MEDICATIONS  (PRN):  acetaminophen   Tablet .. 650 milliGRAM(s) Oral every 4 hours PRN Mild Pain (1 - 3)  lidocaine   Patch 1 Patch Transdermal daily PRN low back pain  traMADol 25 milliGRAM(s) Oral every 6 hours PRN Moderate Pain (4 - 6)  traMADol 50 milliGRAM(s) Oral every 6 hours PRN Severe Pain (7 - 10)      Allergies    No Known Allergies    Intolerances        Social History:  Lives w/ wife in ranch style house  Retired, owned a printing store.  Ambulates with walker  Per wife, "easily confused."    Denies current or past tobacco use.  Infrequent etoh use.  Denies any illicit drug use.  CBD pills for pain. (09 Dec 2020 15:41)      REVIEW OF SYSTEMS:  CONSTITUTIONAL: No fever, No chills, No fatigue, No myalgia, No Body ache, No Weakness  EYES: No eye pain,  No visual disturbances, No discharge, NO Redness  ENMT:  No ear pain, No nose bleed, No vertigo; No sinus pain, NO throat pain, No Congestion  NECK: No pain, No stiffness  RESPIRATORY: No cough, NO wheezing, No  hemoptysis, NO  shortness of breath  CARDIOVASCULAR: No chest pain, palpitations  GASTROINTESTINAL: No abdominal pain, NO epigastric pain. No nausea, No vomiting; No diarrhea, No constipation. [ + ] BM  GENITOURINARY: No dysuria, No frequency, No urgency, No hematuria, NO incontinence  NEUROLOGICAL: No headaches, No dizziness, No numbness, No tingling, No tremors, No weakness  EXT: No Swelling No Edema  SKIN:  [  ] No itching, burning, rashes, or lesions   MUSCULOSKELETAL: No joint pain ,No Jt swelling; [+] L posterior thigh pain, low back pain, and R knee pain.  PSYCHIATRIC: No depression,  No anxiety,  No mood swings ,No difficulty sleeping at night  PAIN SCALE: [  ] None  [ x ] Other- "10/10 R knee pain"  REST OF REVIEW Of SYSTEM - [ x ] Normal     Vital Signs Last 24 Hrs  T(C): 36.4 (10 Dec 2020 05:29), Max: 36.8 (09 Dec 2020 15:50)  T(F): 97.5 (10 Dec 2020 05:29), Max: 98.3 (09 Dec 2020 15:50)  HR: 72 (10 Dec 2020 05:29) (72 - 92)  BP: 167/76 (10 Dec 2020 05:29) (152/80 - 170/71)  BP(mean): --  RR: 17 (10 Dec 2020 05:29) (16 - 17)  SpO2: 90% (10 Dec 2020 05:29) (90% - 100%)  Finger Stick        - @ 07:01  -  -10 @ 07:00  --------------------------------------------------------  IN: 500 mL / OUT: 400 mL / NET: 100 mL        PHYSICAL EXAM:  GENERAL:  [ x ] NAD , [ x ] well appearing, [  ] Agitated, [  ] Drowsy,  [  ] Lethargy, [  ] confused   HEAD:  [x  ] Normal, [  ] Other  EYES:  [x  ] EOMI, [x  ] PERRLA, [ x ] conjunctiva and sclera clear normal, [  ] Other,  [  ] Pallor,[  ] Discharge  ENMT:  [x  ] Normal, [ x ] Moist mucous membranes, [  ] Good dentition, [  ] No Thrush  NECK:  [ x ] Supple, [  ] No JVD, [  ] Normal thyroid, [  ] Lymphadenopathy [  ] Other  CHEST/LUNG:  [ x ] Clear to auscultation bilaterally, [ x ] Breath Sounds equal B/L [  ] poor effort  [x  ] No rales, [ x] No rhonchi  [ x ]  No wheezing,   HEART:  [x  ] Regular rate and rhythm, [  ] tachycardia, [  ] Bradycardia,  [  ] irregular  [ x ] No murmurs, No rubs, No gallops, [  ] PPM in place (Mfr:  )  ABDOMEN:  [ x ] Soft, [ x ] Nontender, [x  ] Nondistended, [  ] No mass, [  ] Bowel sounds present, [  ] obese  MSK: [+] L posterior thigh pain, full ROM, nontender to palpation. [+] sacrococcygeal TTP. R knee nonTTP. (-) homans sign b/l  NERVOUS SYSTEM:  [ x ] Alert & Oriented X2 (oriented to self and place, states president elect is Jesu, thinks the year is ), [  ] Nonfocal  [  ] Confusion  [  ] Encephalopathic [  ] Sedated   EXTREMITIES: [x  ] 2+ Peripheral Pulses, No clubbing, No cyanosis,  [  ] edema B/L lower EXT. [  ] PVD stasis skin changes B/L Lower EXT, [  ] wound  LYMPH: No lymphadenopathy noted  SKIN:  [ x ] No rashes or lesions, [  ] Pressure Ulcers, [  ] ecchymosis, [  ] Skin Tears, [  ] Other          LABS:                        10.9   9.22  )-----------( 225      ( 09 Dec 2020 12:21 )             33.2     09 Dec 2020 12:21    139    |  103    |  23     ----------------------------<  92     4.0     |  28     |  1.30     Ca    9.2        09 Dec 2020 12:21    TPro  7.1    /  Alb  3.2    /  TBili  0.6    /  DBili  x      /  AST  41     /  ALT  39     /  AlkPhos  111    09 Dec 2020 12:21      Urinalysis Basic - ( 09 Dec 2020 12:21 )    Color: Yellow / Appearance: Slightly Turbid / S.010 / pH: x  Gluc: x / Ketone: Small  / Bili: Negative / Urobili: Negative   Blood: x / Protein: 30 mg/dL / Nitrite: Negative   Leuk Esterase: Negative / RBC: 6-10 /HPF / WBC 0-2   Sq Epi: x / Non Sq Epi: Occasional / Bacteria: x                           Anemia Panel:      Thyroid Panel:                RADIOLOGY & ADDITIONAL TESTS:  < from: CT Lumbar Spine No Cont (20 @ 15:04) >  XAM:  CT LUMBAR SPINE                            PROCEDURE DATE:  2020          INTERPRETATION:  INDICATION:  Back pain. Status post trauma.  TECHNIQUE:  A 2.5mm thin section CT study of the lumbar spine was conducted, with axial imaging from L1 to S1.  coronal and sagittal reformations were generated from the axial data. 3-D imaging was performed.    COMPARISON EXAMINATION:No prior    FINDINGS:  ALIGNMENT:  Unremarkable  VERTEBRAL BODIES:  No acute fracture or destructive lesion is identified. Visualized transverse processes and ribs appear to be intact. The sacrum and the mesial/posterior iliac bones are unremarkable. There are underlying chronic degenerative changes of the sacroiliac joints as well as of the lower lumbar disc spaces  DISC SPACES: Disc space narrowing is most prominent at L4-5.  Lower thoracic : Disc spaces maintained  L1-2:   Mild bulging of the disc annulus is noted  L2-3:   A diffuse, broad-based disc bulge is noted with central and foraminal encroachment. Facets and ligaments are mildly thickened. There is moderate to severe foraminal and recess narrowing. Canal is mildly narrowed..  L3-4:   Similarly, there is diffuse, concentric disc prolapse along with facet and ligamentous hypertrophy. There is a moderate foraminal and mild central narrowing.  L4-5:   There is severe recess and foraminal stenosis, with moderate central stenosis. There is a central herniation, with broad-based sac effacement. Facets and ligaments are thickened endplate changes are noted with spondylosis.  L5-S1:   A bulge is noted. L5 is transitional with partial sacralization.  POSTERIOR ELEMENTS:  Hypertrophic changes are noted from L2 to S1  CANAL AND FORAMINA:  Canal is most narrowed at L4-5, with recess and foraminal narrowing most severe at L4-5, but also prominent at L2-3 and L3-4.  MISCELLANEOUS:  Aorta is calcified without aneurysmal dilatation. Renal calculi are noted.    IMPRESSION:    1. No acute fracture suggested.  2. Chronic disc disease and degenerative changes throughout the lumbosacral region. There is diffuse recess and foraminal stenosis from L2 to L5 most severe at L4-5. Canal is most narrowed at the L4-5 level. If clinically warranted, follow-up lumbar MR imaging may be considered for further assessment and to a herniation.    < end of copied text >  < from: CT Head No Cont (20 @ 12:43) >  EXAM:  CT BRAIN                            PROCEDURE DATE:  2020          INTERPRETATION:  Exam Date: 2020 12:04 PM    CT head without IV contrast    CLINICAL INFORMATION:  Mental status change, unknown cause    TECHNIQUE: Contiguous axial sections were obtained through the head.   This scan was performed using automatic exposure control (radiation dose reduction software) to obtain a diagnostic image quality scan with patient dose as low as reasonably achievable.    COMPARISON: CT head 2020    FINDINGS:    There is no evidence of intraparenchymal or extraaxial hemorrhage.   There is no CT evidence of large vessel acute infarct. No mass effect is found in the brain.  No evidence of midline shift or herniation pattern.    Theventricles, sulci and basal cisterns appear unremarkable.    Visualized paranasal sinuses are clear.    IMPRESSION:    No acute intracranial findings.    < end of copied text >  < from: Xray Sacrum + Coccyx (20 @ 13:39) >  EXAM:  SACRUM COCCYX                            PROCEDURE DATE:  2020          INTERPRETATION:  Radiographs of the sacrum and coccyx    CLINICAL INFORMATION:  Injury with  Pain.    TECHNIQUE:  Frontal and lateral views of the sacrum and coccyx were obtained.    FINDINGS:  No prior examinations are available for review.    The sacrum appears intact.  Sacral neural foramina appear symmetric. The sacroiliac joints are intact.  The coccyx appears unremarkable.    IMPRESSION:   No acute radiographic osseous pathology..  If pain persists despite conservative therapy CT scan recommended.    < end of copied text >  < from: Xray Knee 3 Views, Bilateral (20 @ 13:38) >  EXAM:  KNEE AP LAT & OBL. BILAT                            PROCEDURE DATE:  2020          INTERPRETATION:  Date of study: 20    Prior: 16    Technique: 3 views of each knee taken.    Clinical data: 85-yo-male patient who fell.    Findings:  Regarding the right knee:  Patient is status post right total knee arthroplasty with cemented components. The hardware is intact and is in good anatomic alignment.  No discrete joint effusion seen.  Arterial calcifications seen.  The regionalsoft tissues are maintained.    Regarding the left knee:  No fracture or dislocation seen.  Mild, underlying left knee osteoarthritic changes are seen. Mild medial knee space narrowing is seen.  Mild chondrocalcinosis seen probably due to pseudogout.  No joint effusion evident. Arterial calcifications noted.    IMPRESSION:  No acute fracture-subluxation noted.    < end of copied text >  < from: Xray Hip w/ Pelvis Min 4 Views, Left (20 @ 13:13) >  EXAM:  XR HIP WITH PELV MIN 4V LT                            PROCEDURE DATE:  2020          INTERPRETATION:  Evaluate left hip hemiarthroplasty    AP pelvis and 2 views left hip.    There is a left hip hemiarthroplasty with intact hardware satisfactory alignment. Postoperative clips left inguinal region. There is a right THR with normal appearance. Osseous pelvic structures intact unremarkable. Degenerative change in the visualized lower lumbar spine.    Impression: Satisfactory postoperative appearance left hip hemiarthroplasty    < end of copied text >        HEALTH ISSUES - PROBLEM Dx:  S/P AVR (aortic valve replacement)  S/P AVR (aortic valve replacement)    Need for prophylactic measure  Need for prophylactic measure    Anxiety  Anxiety    Hypertension  Hypertension    High cholesterol  High cholesterol    Chronic pain  Chronic pain    BPH (benign prostatic hyperplasia)  BPH (benign prostatic hyperplasia)    Anemia  Anemia    Dementia  Dementia    Fall  Fall            Consultant(s) Notes Reviewed:  [  ] YES     Care Discussed with [X] Consultants  [  x] Patient  [  ] Family [  ] HCP [  ]   [  ] Social Service  [  ] RN, [  ] Physical Therapy,[  ] Palliative care team  DVT PPX: [x  ] Lovenox, [  ] S C Heparin, [  ] Coumadin, [  ] Xarelto, [  ] Eliquis, [  ] Pradaxa, [  ] IV Heparin drip, [  ] SCD [  ] Contraindication 2 to GI Bleed,[  ] Ambulation [  ] Contraindicated 2 to  bleed [  ] Contraindicated 2 to Brain Bleed  Advanced directive: [  ] None, [  ] DNR/DNI Patient is a 85y old  Male who presents with a chief complaint of Fall (10 Dec 2020 08:03)    HPI:  86y/o M with PMH of HTN, HLD, AS (s/p TAVR ), chronic pain (s/p knee and rt hip replacement), BPH, and dementia BIBEMS for a fall. Patient w/ recent admission in April for fall, found to have L hip fx s/p L hip hemiarthroplasty. Patient is a poor historian 2/2 dementia. He states that 2 weeks ago he fell backwards onto his buttock in his garage. Since then, patient has been experiencing sacral/coccygeal pain. He states that he has been compensating and shifting most of his weight to his R leg since the fall and has developed posterior R knee pain that extends to his R ankle. Patient had xrays of his knees and hip 5 days ago which were negative for acute fx. This AM, patient states that he fell in his house while walking. Fall was unwitnessed, patient unable to recall how he fell. He was ambulating without the use of an assistive device (usually uses walker). 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 He states that he was able to pull himself off the ground afterwards. Patient states that he is currently feeling 2/10 pain in his tailbone. Pain is nonradiating and worse when sitting for extended periods of time. He states he has been experiencing this pain since his fall 2 weeks ago. He feels well otherwise and denies any HA, dizziness, CP, cough, SOB, sick contacts, abdominal pain, n/v, dysuria, or LE swelling.     In the ED  VS- 97.1F, 92HR, 170/71, 16RR, O2 sat 100% on RA  Labs significant for Hb 10.9 (baseline 9), CMP WNL, UA positive for moderate blood and protein  XR b/l knee negative for acute fx or subluxation  XR b/l hip showed no acute fx, b/l hip hardware intact and unchanged  CXR grossly normal  CTH showed no acute findings  CT L spine showed no acute fracture. (+) chronic lumbosacral degenerative changes. Stenosis in levels L2-L5, worse at L4-L5 level  ECG NSR, no signs of acute ischemia  s/p toradol, tylenol, lidoderm patch, and 500cc NS in the ED (09 Dec 2020 15:41)    INTERVAL HPI:  12/10: Patient seen and examined at bedside. No events overnight. Patient on bedpan leaning on R side. After helping him off bedpan he complained of L posterior thigh pain and "10/10" R knee pain. (-) homans sign b/l. R knee full ROM, nontender to palpation. Ordered tramadol 75mg x1. Patient denies any HA, dizziness, CP, SOB, n/v, or abdominal pain. PT-- > ROD      Home Medications:  atorvastatin 10 mg oral tablet: 1 tab(s) orally once a day (09 Dec 2020 16:50)  escitalopram 20 mg oral tablet: 1 tab(s) orally once a day (09 Dec 2020 16:50)  finasteride 5 mg oral tablet: 1 tab(s) orally once a day (09 Dec 2020 16:50)  mometasone 0.1% topical solution: Apply to Affected Area once a day  (09 Dec 2020 16:50)  Omega-3 oral capsule: 1 cap(s) orally once a day (09 Dec 2020 16:50)  polyethylene glycol 3350 oral powder for reconstitution: 17 gram(s) orally 2 times a day (09 Dec 2020 16:50)  traMADol 50 mg oral tablet: 1 tab(s) orally every 8 hours, As Needed for pain (09 Dec 2020 16:50)  Vitamin D3 2000 intl units (50 mcg) oral tablet: 1 tab(s) orally once a day (09 Dec 2020 16:50)      MEDICATIONS  (STANDING):  atorvastatin 10 milliGRAM(s) Oral at bedtime  cholecalciferol 2000 Unit(s) Oral daily  enoxaparin Injectable 40 milliGRAM(s) SubCutaneous daily  escitalopram 20 milliGRAM(s) Oral daily  finasteride 5 milliGRAM(s) Oral daily  omega-3-Acid Ethyl Esters 4 Gram(s) Oral daily  polyethylene glycol 3350 17 Gram(s) Oral two times a day  senna 2 Tablet(s) Oral at bedtime    MEDICATIONS  (PRN):  acetaminophen   Tablet .. 650 milliGRAM(s) Oral every 4 hours PRN Mild Pain (1 - 3)  lidocaine   Patch 1 Patch Transdermal daily PRN low back pain  traMADol 25 milliGRAM(s) Oral every 6 hours PRN Moderate Pain (4 - 6)  traMADol 50 milliGRAM(s) Oral every 6 hours PRN Severe Pain (7 - 10)      Allergies    No Known Allergies    Intolerances        Social History:  Lives w/ wife in ranch style house  Retired, owned a printing store.  Ambulates with walker  Per wife, "easily confused."    Denies current or past tobacco use.  Infrequent etoh use.  Denies any illicit drug use.  CBD pills for pain. (09 Dec 2020 15:41)      REVIEW OF SYSTEMS:  CONSTITUTIONAL: No fever, No chills, No fatigue, No myalgia, No Body ache, No Weakness  EYES: No eye pain,  No visual disturbances, No discharge, NO Redness  ENMT:  No ear pain, No nose bleed, No vertigo; No sinus pain, NO throat pain, No Congestion  NECK: No pain, No stiffness  RESPIRATORY: No cough, NO wheezing, No  hemoptysis, NO  shortness of breath  CARDIOVASCULAR: No chest pain, palpitations  GASTROINTESTINAL: No abdominal pain, NO epigastric pain. No nausea, No vomiting; No diarrhea, No constipation. [ + ] BM  GENITOURINARY: No dysuria, No frequency, No urgency, No hematuria, NO incontinence  NEUROLOGICAL: No headaches, No dizziness, No numbness, No tingling, No tremors, No weakness  EXT: No Swelling No Edema  SKIN:  [  ] No itching, burning, rashes, or lesions   MUSCULOSKELETAL: No joint pain ,No Jt swelling; [+] L posterior thigh pain, low back pain, and R knee pain.  PSYCHIATRIC: No depression,  No anxiety,  No mood swings ,No difficulty sleeping at night  PAIN SCALE: [  ] None  [ x ] Other- "10/10 R knee pain"  REST OF REVIEW Of SYSTEM - [ x ] Normal     Vital Signs Last 24 Hrs  T(C): 36.4 (10 Dec 2020 05:29), Max: 36.8 (09 Dec 2020 15:50)  T(F): 97.5 (10 Dec 2020 05:29), Max: 98.3 (09 Dec 2020 15:50)  HR: 72 (10 Dec 2020 05:29) (72 - 92)  BP: 167/76 (10 Dec 2020 05:29) (152/80 - 170/71)  BP(mean): --  RR: 17 (10 Dec 2020 05:29) (16 - 17)  SpO2: 90% (10 Dec 2020 05:29) (90% - 100%)  Finger Stick        - @ 07:01  -  12-10 @ 07:00  --------------------------------------------------------  IN: 500 mL / OUT: 400 mL / NET: 100 mL        PHYSICAL EXAM:  am ok  GENERAL:  [ x ] NAD , [ x ] well appearing, [  ] Agitated, [  ] Drowsy,  [  ] Lethargy, [  ] confused   HEAD:  [x  ] Normal, [  ] Other  EYES:  [x  ] EOMI, [x  ] PERRLA, [ x ] conjunctiva and sclera clear normal, [  ] Other,  [  ] Pallor,[  ] Discharge  ENMT:  [x  ] Normal, [ x ] Moist mucous membranes, [  ] Good dentition, [x ] No Thrush  NECK:  [ x ] Supple, [x  ] No JVD, [ x ] Normal thyroid, [  ] Lymphadenopathy [  ] Other  CHEST/LUNG:  [ x ] Clear to auscultation bilaterally, [ x ] Breath Sounds equal B/L [  ] poor effort  [x  ] No rales, [ x] No rhonchi  [ x ]  No wheezing,   HEART:  [x  ] Regular rate and rhythm, [  ] tachycardia, [  ] Bradycardia,  [  ] irregular  [ x ] No murmurs, No rubs, No gallops, [  ] PPM in place (Mfr:  )  ABDOMEN:  [ x ] Soft, [ x ] Nontender, [x  ] Nondistended, [ x ] No mass, [x  ] Bowel sounds present, [x  ] obese  MSK: [+] L posterior thigh pain, full ROM, nontender to palpation. [+] sacro coccygeal TTP. R knee non TTP. (-) Homans sign b/l  NERVOUS SYSTEM:  [ x ] Alert & Oriented X2 (oriented to self and place, states president elect is Jesu, thinks the year is ), [ x ] Nonfocal  [  ] Confusion  [  ] Encephalopathic [  ] Sedated   EXTREMITIES: [x  ] 2+ Peripheral Pulses, No clubbing, No cyanosis,  [  ] edema B/L lower EXT. [  ] PVD stasis skin changes B/L Lower EXT, [  ] wound  LYMPH: No lymphadenopathy noted  SKIN:  [ x ] No rashes or lesions, [  ] Pressure Ulcers, [  ] ecchymosis, [  ] Skin Tears, [  ] Other    LABS:                        10.9   9.22  )-----------( 225      ( 09 Dec 2020 12:21 )             33.2     09 Dec 2020 12:21    139    |  103    |  23     ----------------------------<  92     4.0     |  28     |  1.30     Ca    9.2        09 Dec 2020 12:21    TPro  7.1    /  Alb  3.2    /  TBili  0.6    /  DBili  x      /  AST  41     /  ALT  39     /  AlkPhos  111    09 Dec 2020 12:21      Urinalysis Basic - ( 09 Dec 2020 12:21 )    Color: Yellow / Appearance: Slightly Turbid / S.010 / pH: x  Gluc: x / Ketone: Small  / Bili: Negative / Urobili: Negative   Blood: x / Protein: 30 mg/dL / Nitrite: Negative   Leuk Esterase: Negative / RBC: 6-10 /HPF / WBC 0-2   Sq Epi: x / Non Sq Epi: Occasional / Bacteria: x      RADIOLOGY & ADDITIONAL TESTS:  < from: CT Lumbar Spine No Cont (20 @ 15:04) >  XAM:  CT LUMBAR SPINE                            PROCEDURE DATE:  2020          INTERPRETATION:  INDICATION:  Back pain. Status post trauma.  TECHNIQUE:  A 2.5mm thin section CT study of the lumbar spine was conducted, with axial imaging from L1 to S1.  coronal and sagittal reformations were generated from the axial data. 3-D imaging was performed.    COMPARISON EXAMINATION:No prior    FINDINGS:  ALIGNMENT:  Unremarkable  VERTEBRAL BODIES:  No acute fracture or destructive lesion is identified. Visualized transverse processes and ribs appear to be intact. The sacrum and the mesial/posterior iliac bones are unremarkable. There are underlying chronic degenerative changes of the sacroiliac joints as well as of the lower lumbar disc spaces  DISC SPACES: Disc space narrowing is most prominent at L4-5.  Lower thoracic : Disc spaces maintained  L1-2:   Mild bulging of the disc annulus is noted  L2-3:   A diffuse, broad-based disc bulge is noted with central and foraminal encroachment. Facets and ligaments are mildly thickened. There is moderate to severe foraminal and recess narrowing. Canal is mildly narrowed..  L3-4:   Similarly, there is diffuse, concentric disc prolapse along with facet and ligamentous hypertrophy. There is a moderate foraminal and mild central narrowing.  L4-5:   There is severe recess and foraminal stenosis, with moderate central stenosis. There is a central herniation, with broad-based sac effacement. Facets and ligaments are thickened endplate changes are noted with spondylosis.  L5-S1:   A bulge is noted. L5 is transitional with partial sacralization.  POSTERIOR ELEMENTS:  Hypertrophic changes are noted from L2 to S1  CANAL AND FORAMINA:  Canal is most narrowed at L4-5, with recess and foraminal narrowing most severe at L4-5, but also prominent at L2-3 and L3-4.  MISCELLANEOUS:  Aorta is calcified without aneurysmal dilatation. Renal calculi are noted.    IMPRESSION:    1. No acute fracture suggested.  2. Chronic disc disease and degenerative changes throughout the lumbosacral region. There is diffuse recess and foraminal stenosis from L2 to L5 most severe at L4-5. Canal is most narrowed at the L4-5 level. If clinically warranted, follow-up lumbar MR imaging may be considered for further assessment and to a herniation.    < end of copied text >  < from: CT Head No Cont (20 @ 12:43) >  EXAM:  CT BRAIN                            PROCEDURE DATE:  2020          INTERPRETATION:  Exam Date: 2020 12:04 PM    CT head without IV contrast    CLINICAL INFORMATION:  Mental status change, unknown cause    TECHNIQUE: Contiguous axial sections were obtained through the head.   This scan was performed using automatic exposure control (radiation dose reduction software) to obtain a diagnostic image quality scan with patient dose as low as reasonably achievable.    COMPARISON: CT head 2020    FINDINGS:    There is no evidence of intraparenchymal or extraaxial hemorrhage.   There is no CT evidence of large vessel acute infarct. No mass effect is found in the brain.  No evidence of midline shift or herniation pattern.    Theventricles, sulci and basal cisterns appear unremarkable.    Visualized paranasal sinuses are clear.    IMPRESSION:    No acute intracranial findings.    < end of copied text >  < from: Xray Sacrum + Coccyx (20 @ 13:39) >  EXAM:  SACRUM COCCYX                            PROCEDURE DATE:  2020          INTERPRETATION:  Radiographs of the sacrum and coccyx    CLINICAL INFORMATION:  Injury with  Pain.    TECHNIQUE:  Frontal and lateral views of the sacrum and coccyx were obtained.    FINDINGS:  No prior examinations are available for review.    The sacrum appears intact.  Sacral neural foramina appear symmetric. The sacroiliac joints are intact.  The coccyx appears unremarkable.    IMPRESSION:   No acute radiographic osseous pathology..  If pain persists despite conservative therapy CT scan recommended.    < end of copied text >  < from: Xray Knee 3 Views, Bilateral (20 @ 13:38) >  EXAM:  KNEE AP LAT & OBL. BILAT                            PROCEDURE DATE:  2020          INTERPRETATION:  Date of study: 20    Prior: 16    Technique: 3 views of each knee taken.    Clinical data: 85-yo-male patient who fell.    Findings:  Regarding the right knee:  Patient is status post right total knee arthroplasty with cemented components. The hardware is intact and is in good anatomic alignment.  No discrete joint effusion seen.  Arterial calcifications seen.  The regionalsoft tissues are maintained.    Regarding the left knee:  No fracture or dislocation seen.  Mild, underlying left knee osteoarthritic changes are seen. Mild medial knee space narrowing is seen.  Mild chondrocalcinosis seen probably due to pseudogout.  No joint effusion evident. Arterial calcifications noted.    IMPRESSION:  No acute fracture-subluxation noted.    < end of copied text >  < from: Xray Hip w/ Pelvis Min 4 Views, Left (20 @ 13:13) >  EXAM:  XR HIP WITH PELV MIN 4V LT                            PROCEDURE DATE:  2020          INTERPRETATION:  Evaluate left hip hemiarthroplasty    AP pelvis and 2 views left hip.    There is a left hip hemiarthroplasty with intact hardware satisfactory alignment. Postoperative clips left inguinal region. There is a right THR with normal appearance. Osseous pelvic structures intact unremarkable. Degenerative change in the visualized lower lumbar spine.    Impression: Satisfactory postoperative appearance left hip hemiarthroplasty    < end of copied text >        HEALTH ISSUES - PROBLEM Dx:  S/P AVR (aortic valve replacement)  S/P AVR (aortic valve replacement)    Need for prophylactic measure  Need for prophylactic measure    Anxiety  Anxiety    Hypertension  Hypertension    High cholesterol  High cholesterol    Chronic pain  Chronic pain    BPH (benign prostatic hyperplasia)  BPH (benign prostatic hyperplasia)    Anemia  Anemia    Dementia  Dementia    Fall  Fall      Consultant(s) Notes Reviewed:  [x  ] YES     Care Discussed with [X] Consultants  [  x] Patient  [ x ] Family [  ] HCP [  ]   [  ] Social Service  [ x ] RN, [  ] Physical Therapy,[  ] Palliative care team  DVT PPX: [x  ] Lovenox, [  ] S C Heparin, [  ] Coumadin, [  ] Xarelto, [  ] Eliquis, [  ] Pradaxa, [  ] IV Heparin drip, [  ] SCD [  ] Contraindication 2 to GI Bleed,[  ] Ambulation [  ] Contraindicated 2 to  bleed [  ] Contraindicated 2 to Brain Bleed  Advanced directive: [ x ] None, [  ] DNR/DNI Patient is a 85y old  Male who presents with a chief complaint of Fall (10 Dec 2020 08:03)    HPI:  84y/o M with PMH of HTN, HLD, AS (s/p TAVR ), chronic pain (s/p knee and rt hip replacement), BPH, and dementia BIBEMS for a fall. Patient w/ recent admission in April for fall, found to have L hip fx s/p L hip hemiarthroplasty. Patient is a poor historian 2/2 dementia. He states that 2 weeks ago he fell backwards onto his buttock in his garage. Since then, patient has been experiencing sacral/coccygeal pain. He states that he has been compensating and shifting most of his weight to his R leg since the fall and has developed posterior R knee pain that extends to his R ankle. Patient had xrays of his knees and hip 5 days ago which were negative for acute fx. This AM, patient states that he fell in his house while walking. Fall was unwitnessed, patient unable to recall how he fell. He was ambulating without the use of an assistive device (usually uses walker). 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 He states that he was able to pull himself off the ground afterwards. Patient states that he is currently feeling 2/10 pain in his tailbone. Pain is nonradiating and worse when sitting for extended periods of time. He states he has been experiencing this pain since his fall 2 weeks ago. He feels well otherwise and denies any HA, dizziness, CP, cough, SOB, sick contacts, abdominal pain, n/v, dysuria, or LE swelling.     In the ED  VS- 97.1F, 92HR, 170/71, 16RR, O2 sat 100% on RA  Labs significant for Hb 10.9 (baseline 9), CMP WNL, UA positive for moderate blood and protein  XR b/l knee negative for acute fx or subluxation  XR b/l hip showed no acute fx, b/l hip hardware intact and unchanged  CXR grossly normal  CTH showed no acute findings  CT L spine showed no acute fracture. (+) chronic lumbosacral degenerative changes. Stenosis in levels L2-L5, worse at L4-L5 level  ECG NSR, no signs of acute ischemia  s/p toradol, tylenol, lidoderm patch, and 500cc NS in the ED (09 Dec 2020 15:41)    INTERVAL HPI:  12/10: Patient seen and examined at bedside. No events overnight. Patient on bedpan leaning on R side. After helping him off bedpan he complained of L posterior thigh pain and "10/10" R knee pain. (-) homans sign b/l. R knee full ROM, nontender to palpation. Ordered tramadol 75mg x1. Patient denies any HA, dizziness, CP, SOB, n/v, or abdominal pain. PT-- > ROD, D/W  Wife -HCP, MOLST form d/w wife, pt is DNR/DNI      Home Medications:  atorvastatin 10 mg oral tablet: 1 tab(s) orally once a day (09 Dec 2020 16:50)  escitalopram 20 mg oral tablet: 1 tab(s) orally once a day (09 Dec 2020 16:50)  finasteride 5 mg oral tablet: 1 tab(s) orally once a day (09 Dec 2020 16:50)  mometasone 0.1% topical solution: Apply to Affected Area once a day  (09 Dec 2020 16:50)  Omega-3 oral capsule: 1 cap(s) orally once a day (09 Dec 2020 16:50)  polyethylene glycol 3350 oral powder for reconstitution: 17 gram(s) orally 2 times a day (09 Dec 2020 16:50)  traMADol 50 mg oral tablet: 1 tab(s) orally every 8 hours, As Needed for pain (09 Dec 2020 16:50)  Vitamin D3 2000 intl units (50 mcg) oral tablet: 1 tab(s) orally once a day (09 Dec 2020 16:50)      MEDICATIONS  (STANDING):  atorvastatin 10 milliGRAM(s) Oral at bedtime  cholecalciferol 2000 Unit(s) Oral daily  enoxaparin Injectable 40 milliGRAM(s) SubCutaneous daily  escitalopram 20 milliGRAM(s) Oral daily  finasteride 5 milliGRAM(s) Oral daily  omega-3-Acid Ethyl Esters 4 Gram(s) Oral daily  polyethylene glycol 3350 17 Gram(s) Oral two times a day  senna 2 Tablet(s) Oral at bedtime    MEDICATIONS  (PRN):  acetaminophen   Tablet .. 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  Ambulates with walker  Per wife, "easily confused."    Denies current or past tobacco use.  Infrequent etoh use.  Denies any illicit drug use.  CBD pills for pain. (09 Dec 2020 15:41)      REVIEW OF SYSTEMS:  CONSTITUTIONAL: No fever, No chills, No fatigue, No myalgia, No Body ache, No Weakness  EYES: No eye pain,  No visual disturbances, No discharge, NO Redness  ENMT:  No ear pain, No nose bleed, No vertigo; No sinus pain, NO throat pain, No Congestion  NECK: No pain, No stiffness  RESPIRATORY: No cough, NO wheezing, No  hemoptysis, NO  shortness of breath  CARDIOVASCULAR: No chest pain, palpitations  GASTROINTESTINAL: No abdominal pain, NO epigastric pain. No nausea, No vomiting; No diarrhea, No constipation. [ + ] BM  GENITOURINARY: No dysuria, No frequency, No urgency, No hematuria, NO incontinence  NEUROLOGICAL: No headaches, No dizziness, No numbness, No tingling, No tremors, No weakness  EXT: No Swelling No Edema  SKIN:  [  ] No itching, burning, rashes, or lesions   MUSCULOSKELETAL: No joint pain ,No Jt swelling; [+] L posterior thigh pain, low back pain, and R knee pain.  PSYCHIATRIC: No depression,  No anxiety,  No mood swings ,No difficulty sleeping at night  PAIN SCALE: [  ] None  [ x ] Other- "10/10 R knee pain"  REST OF REVIEW Of SYSTEM - [ x ] Normal     Vital Signs Last 24 Hrs  T(C): 36.4 (10 Dec 2020 05:29), Max: 36.8 (09 Dec 2020 15:50)  T(F): 97.5 (10 Dec 2020 05:29), Max: 98.3 (09 Dec 2020 15:50)  HR: 72 (10 Dec 2020 05:29) (72 - 92)  BP: 167/76 (10 Dec 2020 05:29) (152/80 - 170/71)  BP(mean): --  RR: 17 (10 Dec 2020 05:29) (16 - 17)  SpO2: 90% (10 Dec 2020 05:29) (90% - 100%)  Finger Stick        - @ 07:01  -  12-10 @ 07:00  --------------------------------------------------------  IN: 500 mL / OUT: 400 mL / NET: 100 mL        PHYSICAL EXAM:  am ok  GENERAL:  [ x ] NAD , [ x ] well appearing, [  ] Agitated, [  ] Drowsy,  [  ] Lethargy, [  ] confused   HEAD:  [x  ] Normal, [  ] Other  EYES:  [x  ] EOMI, [x  ] PERRLA, [ x ] conjunctiva and sclera clear normal, [  ] Other,  [  ] Pallor,[  ] Discharge  ENMT:  [x  ] Normal, [ x ] Moist mucous membranes, [  ] Good dentition, [x ] No Thrush  NECK:  [ x ] Supple, [x  ] No JVD, [ x ] Normal thyroid, [  ] Lymphadenopathy [  ] Other  CHEST/LUNG:  [ x ] Clear to auscultation bilaterally, [ x ] Breath Sounds equal B/L [  ] poor effort  [x  ] No rales, [ x] No rhonchi  [ x ]  No wheezing,   HEART:  [x  ] Regular rate and rhythm, [  ] tachycardia, [  ] Bradycardia,  [  ] irregular  [ x ] No murmurs, No rubs, No gallops, [  ] PPM in place (Mfr:  )  ABDOMEN:  [ x ] Soft, [ x ] Nontender, [x  ] Nondistended, [ x ] No mass, [x  ] Bowel sounds present, [x  ] obese  MSK: [+] L posterior thigh pain, full ROM, nontender to palpation. [+] sacro coccygeal TTP. R knee non TTP. (-) Homans sign b/l  NERVOUS SYSTEM:  [ x ] Alert & Oriented X1 -2 (oriented to self and place, states president elect is Jesu, thinks the year is ), [ x ] Nonfocal  [  ] Confusion  [  ] Encephalopathic [  ] Sedated   EXTREMITIES: [x  ] 2+ Peripheral Pulses, No clubbing, No cyanosis,  [  ] edema B/L lower EXT. [  ] PVD stasis skin changes B/L Lower EXT, [  ] wound  LYMPH: No lymphadenopathy noted  SKIN:  [ x ] No rashes or lesions, [  ] Pressure Ulcers, [  ] ecchymosis, [  ] Skin Tears, [  ] Other    LABS:                        10.9   9.22  )-----------( 225      ( 09 Dec 2020 12:21 )             33.2     09 Dec 2020 12:21    139    |  103    |  23     ----------------------------<  92     4.0     |  28     |  1.30     Ca    9.2        09 Dec 2020 12:21    TPro  7.1    /  Alb  3.2    /  TBili  0.6    /  DBili  x      /  AST  41     /  ALT  39     /  AlkPhos  111    09 Dec 2020 12:21      Urinalysis Basic - ( 09 Dec 2020 12:21 )    Color: Yellow / Appearance: Slightly Turbid / S.010 / pH: x  Gluc: x / Ketone: Small  / Bili: Negative / Urobili: Negative   Blood: x / Protein: 30 mg/dL / Nitrite: Negative   Leuk Esterase: Negative / RBC: 6-10 /HPF / WBC 0-2   Sq Epi: x / Non Sq Epi: Occasional / Bacteria: x      RADIOLOGY & ADDITIONAL TESTS:  < from: CT Lumbar Spine No Cont (20 @ 15:04) >  XAM:  CT LUMBAR SPINE                            PROCEDURE DATE:  2020          INTERPRETATION:  INDICATION:  Back pain. Status post trauma.  TECHNIQUE:  A 2.5mm thin section CT study of the lumbar spine was conducted, with axial imaging from L1 to S1.  coronal and sagittal reformations were generated from the axial data. 3-D imaging was performed.    COMPARISON EXAMINATION:No prior    FINDINGS:  ALIGNMENT:  Unremarkable  VERTEBRAL BODIES:  No acute fracture or destructive lesion is identified. Visualized transverse processes and ribs appear to be intact. The sacrum and the mesial/posterior iliac bones are unremarkable. There are underlying chronic degenerative changes of the sacroiliac joints as well as of the lower lumbar disc spaces  DISC SPACES: Disc space narrowing is most prominent at L4-5.  Lower thoracic : Disc spaces maintained  L1-2:   Mild bulging of the disc annulus is noted  L2-3:   A diffuse, broad-based disc bulge is noted with central and foraminal encroachment. Facets and ligaments are mildly thickened. There is moderate to severe foraminal and recess narrowing. Canal is mildly narrowed..  L3-4:   Similarly, there is diffuse, concentric disc prolapse along with facet and ligamentous hypertrophy. There is a moderate foraminal and mild central narrowing.  L4-5:   There is severe recess and foraminal stenosis, with moderate central stenosis. There is a central herniation, with broad-based sac effacement. Facets and ligaments are thickened endplate changes are noted with spondylosis.  L5-S1:   A bulge is noted. L5 is transitional with partial sacralization.  POSTERIOR ELEMENTS:  Hypertrophic changes are noted from L2 to S1  CANAL AND FORAMINA:  Canal is most narrowed at L4-5, with recess and foraminal narrowing most severe at L4-5, but also prominent at L2-3 and L3-4.  MISCELLANEOUS:  Aorta is calcified without aneurysmal dilatation. Renal calculi are noted.    IMPRESSION:    1. No acute fracture suggested.  2. Chronic disc disease and degenerative changes throughout the lumbosacral region. There is diffuse recess and foraminal stenosis from L2 to L5 most severe at L4-5. Canal is most narrowed at the L4-5 level. If clinically warranted, follow-up lumbar MR imaging may be considered for further assessment and to a herniation.    < end of copied text >  < from: CT Head No Cont (20 @ 12:43) >  EXAM:  CT BRAIN                            PROCEDURE DATE:  2020          INTERPRETATION:  Exam Date: 2020 12:04 PM    CT head without IV contrast    CLINICAL INFORMATION:  Mental status change, unknown cause    TECHNIQUE: Contiguous axial sections were obtained through the head.   This scan was performed using automatic exposure control (radiation dose reduction software) to obtain a diagnostic image quality scan with patient dose as low as reasonably achievable.    COMPARISON: CT head 2020    FINDINGS:    There is no evidence of intraparenchymal or extraaxial hemorrhage.   There is no CT evidence of large vessel acute infarct. No mass effect is found in the brain.  No evidence of midline shift or herniation pattern.    Theventricles, sulci and basal cisterns appear unremarkable.    Visualized paranasal sinuses are clear.    IMPRESSION:    No acute intracranial findings.    < end of copied text >  < from: Xray Sacrum + Coccyx (20 @ 13:39) >  EXAM:  SACRUM COCCYX                            PROCEDURE DATE:  2020          INTERPRETATION:  Radiographs of the sacrum and coccyx    CLINICAL INFORMATION:  Injury with  Pain.    TECHNIQUE:  Frontal and lateral views of the sacrum and coccyx were obtained.    FINDINGS:  No prior examinations are available for review.    The sacrum appears intact.  Sacral neural foramina appear symmetric. The sacroiliac joints are intact.  The coccyx appears unremarkable.    IMPRESSION:   No acute radiographic osseous pathology..  If pain persists despite conservative therapy CT scan recommended.    < end of copied text >  < from: Xray Knee 3 Views, Bilateral (20 @ 13:38) >  EXAM:  KNEE AP LAT & OBL. BILAT                            PROCEDURE DATE:  2020          INTERPRETATION:  Date of study: 20    Prior: 16    Technique: 3 views of each knee taken.    Clinical data: 85-yo-male patient who fell.    Findings:  Regarding the right knee:  Patient is status post right total knee arthroplasty with cemented components. The hardware is intact and is in good anatomic alignment.  No discrete joint effusion seen.  Arterial calcifications seen.  The regionalsoft tissues are maintained.    Regarding the left knee:  No fracture or dislocation seen.  Mild, underlying left knee osteoarthritic changes are seen. Mild medial knee space narrowing is seen.  Mild chondrocalcinosis seen probably due to pseudogout.  No joint effusion evident. Arterial calcifications noted.    IMPRESSION:  No acute fracture-subluxation noted.    < end of copied text >  < from: Xray Hip w/ Pelvis Min 4 Views, Left (20 @ 13:13) >  EXAM:  XR HIP WITH PELV MIN 4V LT                            PROCEDURE DATE:  2020          INTERPRETATION:  Evaluate left hip hemiarthroplasty    AP pelvis and 2 views left hip.    There is a left hip hemiarthroplasty with intact hardware satisfactory alignment. Postoperative clips left inguinal region. There is a right THR with normal appearance. Osseous pelvic structures intact unremarkable. Degenerative change in the visualized lower lumbar spine.    Impression: Satisfactory postoperative appearance left hip hemiarthroplasty    < end of copied text >        HEALTH ISSUES - PROBLEM Dx:  S/P AVR (aortic valve replacement)  S/P AVR (aortic valve replacement)    Need for prophylactic measure  Need for prophylactic measure    Anxiety  Anxiety    Hypertension  Hypertension    High cholesterol  High cholesterol    Chronic pain  Chronic pain    BPH (benign prostatic hyperplasia)  BPH (benign prostatic hyperplasia)    Anemia  Anemia    Dementia  Dementia    Fall  Fall      Consultant(s) Notes Reviewed:  [x  ] YES     Care Discussed with [X] Consultants  [  x] Patient  [ x ] Family- Wife [  ] HCP [  ]   [  ] Social Service  [ x ] RN, [  ] Physical Therapy,[  ] Palliative care team  DVT PPX: [x  ] Lovenox, [  ] S C Heparin, [  ] Coumadin, [  ] Xarelto, [  ] Eliquis, [  ] Pradaxa, [  ] IV Heparin drip, [  ] SCD [  ] Contraindication 2 to GI Bleed,[  ] Ambulation [  ] Contraindicated 2 to  bleed [  ] Contraindicated 2 to Brain Bleed  Advanced directive: [ x ] None, [  ] DNR/DNI

## 2020-12-10 NOTE — DISCHARGE NOTE PROVIDER - HOSPITAL COURSE
FROM ADMISSION H+P:   HPI:  86y/o M with PMH of HTN, HLD, AS (s/p TAVR 2015), chronic pain (s/p knee and rt hip replacement), BPH, and dementia BIBEMS for a fall. Patient w/ recent admission in April for fall, found to have L hip fx s/p L hip hemiarthroplasty. Patient is a poor historian 2/2 dementia. He states that 2 weeks ago he fell backwards onto his buttock in his garage. Since then, patient has been experiencing sacral/coccygeal pain. He states that he has been compensating and shifting most of his weight to his R leg since the fall and has developed posterior R knee pain that extends to his R ankle. Patient had xrays of his knees and hip 5 days ago which were negative for acute fx. This AM, patient states that he fell in his house while walking. Fall was unwitnessed, patient unable to recall how he fell. He was ambulating without the use of an assistive device (usually uses walker). 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 He states that he was able to pull himself off the ground afterwards. Patient states that he is currently feeling 2/10 pain in his tailbone. Pain is nonradiating and worse when sitting for extended periods of time. He states he has been experiencing this pain since his fall 2 weeks ago. He feels well otherwise and denies any HA, dizziness, CP, cough, SOB, sick contacts, abdominal pain, n/v, dysuria, or LE swelling.     In the ED  VS- 97.1F, 92HR, 170/71, 16RR, O2 sat 100% on RA  Labs significant for Hb 10.9 (baseline 9), CMP WNL, UA positive for moderate blood and protein  XR b/l knee negative for acute fx or subluxation  XR b/l hip showed no acute fx, b/l hip hardware intact and unchanged  CXR grossly normal  CTH showed no acute findings  CT L spine showed no acute fracture. (+) chronic lumbosacral degenerative changes. Stenosis in levels L2-L5, worse at L4-L5 level  ECG NSR, no signs of acute ischemia  s/p toradol, tylenol, lidoderm patch, and 500cc NS in the ED (09 Dec 2020 15:41)      ---  HOSPITAL COURSE:   The patient was admitted to the [facility] for further evaluation of recurrent falls. All imaging was negative for any acute fracture or dislocation, though was consistent with some chronic generative changes of the LS spine. Neurology was consulted for both recurrent falls as well as dementia, and suggested __________________________________.  Patient's pain was controlled during the admission. Initial PT evaluation at the time of admission recommended subacute rehab on discharge due to impaired mobility, poor balance. He continued to work with PT during his admission, and mobility continued to improve. He was stable for discharge to __________________ on 12/__/20. He was seen and evaluated on the day of discharge.   ---  CONSULTANTS:   Neurology Dr Jansen  ---  TIME SPENT:  I, the attending physician, was physically present for the key portions of the evaluation and management (E/M) service provided. The total amount of time spent reviewing the hospital notes, laboratory values, imaging findings, assessing/counseling the patient, discussing with consultant physicians, social work, nursing staff was 41 minutes    ---  Primary care provider was made aware of plan for discharge:      [  ] NO     [x ] YES   FROM ADMISSION H+P:   HPI:  84y/o M with PMH of HTN, HLD, AS (s/p TAVR 2015), chronic pain (s/p knee and rt hip replacement), BPH, and dementia BIBEMS for a fall. Patient w/ recent admission in April for fall, found to have L hip fx s/p L hip hemiarthroplasty. Patient is a poor historian 2/2 dementia. He states that 2 weeks ago he fell backwards onto his buttock in his garage. Since then, patient has been experiencing sacral/coccygeal pain. He states that he has been compensating and shifting most of his weight to his R leg since the fall and has developed posterior R knee pain that extends to his R ankle. Patient had xrays of his knees and hip 5 days ago which were negative for acute fx. This AM, patient states that he fell in his house while walking. Fall was unwitnessed, patient unable to recall how he fell. He was ambulating without the use of an assistive device (usually uses walker). 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 He states that he was able to pull himself off the ground afterwards. Patient states that he is currently feeling 2/10 pain in his tailbone. Pain is nonradiating and worse when sitting for extended periods of time. He states he has been experiencing this pain since his fall 2 weeks ago. He feels well otherwise and denies any HA, dizziness, CP, cough, SOB, sick contacts, abdominal pain, n/v, dysuria, or LE swelling.     In the ED  VS- 97.1F, 92HR, 170/71, 16RR, O2 sat 100% on RA  Labs significant for Hb 10.9 (baseline 9), CMP WNL, UA positive for moderate blood and protein  XR b/l knee negative for acute fx or subluxation  XR b/l hip showed no acute fx, b/l hip hardware intact and unchanged  CXR grossly normal  CTH showed no acute findings  CT L spine showed no acute fracture. (+) chronic lumbosacral degenerative changes. Stenosis in levels L2-L5, worse at L4-L5 level  ECG NSR, no signs of acute ischemia  s/p toradol, tylenol, lidoderm patch, and 500cc NS in the ED (09 Dec 2020 15:41)      ---  HOSPITAL COURSE:   The patient was admitted to the [facility] for further evaluation of recurrent falls. All imaging was negative for any acute fracture or dislocation, though was consistent with some chronic generative changes of the LS spine. Neurology was consulted for both recurrent falls as well as dementia, and suggested that falls were likely 2/2 deconditioning. He also suggested adding seroquel 12.5mg qhs PRN agitation/confusion for his dementia. Orthopedics, Dr. Gandhi was consulted for low back pain and stated that there is a low suspicion for acute back etiology with negative lumbar CT for bony pathology in the setting of normal neurological exam and no radicular symptoms--Likely bone contusion/soft tissue strain.  Patient's pain was controlled during the admission. Initial PT evaluation at the time of admission recommended subacute rehab on discharge due to impaired mobility, poor balance. He continued to work with PT during his admission, and mobility continued to improve. He was stable for discharge to [facility] on 12/11/20. He was seen and evaluated on the day of discharge.     Vital Signs Last 24 Hrs  T(C): 36.7 (11 Dec 2020 05:35), Max: 36.7 (10 Dec 2020 13:25)  T(F): 98.1 (11 Dec 2020 05:35), Max: 98.1 (10 Dec 2020 13:25)  HR: 82 (11 Dec 2020 05:35) (82 - 86)  BP: 170/83 (11 Dec 2020 05:35) (137/89 - 170/83)  BP(mean): --  RR: 18 (11 Dec 2020 05:35) (17 - 19)  SpO2: 92% (11 Dec 2020 05:35) (92% - 99%)    PHYSICAL EXAM:  am ok  GENERAL:  [ x ] NAD , [ x ] well appearing, [  ] Agitated, [  ] Drowsy,  [  ] Lethargy, [  ] confused   HEAD:  [x  ] Normal, [  ] Other  EYES:  [x  ] EOMI, [x  ] PERRLA, [ x ] conjunctiva and sclera clear normal, [  ] Other,  [  ] Pallor,[  ] Discharge  ENMT:  [x  ] Normal, [ x ] Moist mucous membranes, [  ] Good dentition, [x ] No Thrush  NECK:  [ x ] Supple, [x  ] No JVD, [ x ] Normal thyroid, [  ] Lymphadenopathy [  ] Other  CHEST/LUNG:  [ x ] Clear to auscultation bilaterally, [ x ] Breath Sounds equal B/L [  ] poor effort  [x  ] No rales, [ x] No rhonchi  [ x ]  No wheezing,   HEART:  [x  ] Regular rate and rhythm, [  ] tachycardia, [  ] Bradycardia,  [  ] irregular  [ x ] No murmurs, No rubs, No gallops, [  ] PPM in place (Mfr:  )  ABDOMEN:  [ x ] Soft, [ x ] Nontender, [x  ] Nondistended, [ x ] No mass, [x  ] Bowel sounds present, [x  ] obese  MSK: [+] b/l posterior thigh pain, full ROM, nontender to palpation. [+] sacro coccygeal TTP. b/l knee non TTP. (-) Homans sign b/l  NERVOUS SYSTEM:  [ x ] Alert & Oriented X1 -2 (oriented to place, states president elect is Jesu, year is 2020, birthday however is correct month and day but thinks year is 2020), [ x ] Nonfocal  [  ] Confusion  [  ] Encephalopathic [  ] Sedated   EXTREMITIES: [x  ] 2+ Peripheral Pulses, No clubbing, No cyanosis,  [  ] edema B/L lower EXT. [  ] PVD stasis skin changes B/L Lower EXT, [  ] wound  LYMPH: No lymphadenopathy noted  SKIN:  [ x ] No rashes or lesions, [  ] Pressure Ulcers, [  ] ecchymosis, [  ] Skin Tears, [  ] Other    ---  CONSULTANTS:   Neurology Dr Jansen  Orthopedics Dr Gandhi  ---  TIME SPENT:  I, the attending physician, was physically present for the key portions of the evaluation and management (E/M) service provided. The total amount of time spent reviewing the hospital notes, laboratory values, imaging findings, assessing/counseling the patient, discussing with consultant physicians, social work, nursing staff was 41 minutes    ---  Primary care provider was made aware of plan for discharge:      [  ] NO     [x ] YES   FROM ADMISSION H+P:   HPI:  86y/o M with PMH of HTN, HLD, AS (s/p TAVR 2015), chronic pain (s/p knee and rt hip replacement), BPH, and dementia BIBEMS for a fall. Patient w/ recent admission in April for fall, found to have L hip fx s/p L hip hemiarthroplasty. Patient is a poor historian 2/2 dementia. He states that 2 weeks ago he fell backwards onto his buttock in his garage. Since then, patient has been experiencing sacral/coccygeal pain. He states that he has been compensating and shifting most of his weight to his R leg since the fall and has developed posterior R knee pain that extends to his R ankle. Patient had xrays of his knees and hip 5 days ago which were negative for acute fx. This AM, patient states that he fell in his house while walking. Fall was unwitnessed, patient unable to recall how he fell. He was ambulating without the use of an assistive device (usually uses walker). 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 He states that he was able to pull himself off the ground afterwards. Patient states that he is currently feeling 2/10 pain in his tailbone. Pain is nonradiating and worse when sitting for extended periods of time. He states he has been experiencing this pain since his fall 2 weeks ago. He feels well otherwise and denies any HA, dizziness, CP, cough, SOB, sick contacts, abdominal pain, n/v, dysuria, or LE swelling.     In the ED  VS- 97.1F, 92HR, 170/71, 16RR, O2 sat 100% on RA  Labs significant for Hb 10.9 (baseline 9), CMP WNL, UA positive for moderate blood and protein  XR b/l knee negative for acute fx or subluxation  XR b/l hip showed no acute fx, b/l hip hardware intact and unchanged  CXR grossly normal  CTH showed no acute findings  CT L spine showed no acute fracture. (+) chronic lumbosacral degenerative changes. Stenosis in levels L2-L5, worse at L4-L5 level  ECG NSR, no signs of acute ischemia  s/p toradol, tylenol, lidoderm patch, and 500cc NS in the ED (09 Dec 2020 15:41)      ---  HOSPITAL COURSE:   The patient was admitted to the [facility] for further evaluation of recurrent falls. All imaging was negative for any acute fracture or dislocation, though was consistent with some chronic generative changes of the LS spine. Neurology was consulted for both recurrent falls as well as dementia, and suggested that falls were likely 2/2 deconditioning. He also suggested adding seroquel 12.5mg qhs PRN agitation/confusion for his dementia. Orthopedics, Dr. Gandhi was consulted for low back pain and stated that there is a low suspicion for acute back etiology with negative lumbar CT for bony pathology in the setting of normal neurological exam and no radicular symptoms--Likely bone contusion/soft tissue strain.  Patient's pain was controlled during the admission. Initial PT evaluation at the time of admission recommended subacute rehab on discharge due to impaired mobility, poor balance. He continued to work with PT during his admission, and mobility continued to improve. Course was complicated by urinary retention, requiring alford catheter placement. He was likely in retention 2/2 constipation and thus was given enema after which he was able to pass sizeable bowel movement. He was stable for discharge to [facility] on 12/11/20. He was seen and evaluated on the day of discharge.     Vital Signs Last 24 Hrs  T(C): 36.6 (11 Dec 2020 13:11), Max: 36.7 (11 Dec 2020 05:35)  T(F): 97.9 (11 Dec 2020 13:11), Max: 98.1 (11 Dec 2020 05:35)  HR: 86 (11 Dec 2020 13:11) (82 - 108)  BP: 172/78 (11 Dec 2020 13:11) (137/89 - 172/78)  BP(mean): --  RR: 16 (11 Dec 2020 13:11) (16 - 19)  SpO2: 97% (11 Dec 2020 13:11) (92% - 99%)    PHYSICAL EXAM:  am ok  GENERAL:  [ x ] NAD , [ x ] well appearing, [  ] Agitated, [  ] Drowsy,  [  ] Lethargy, [  ] confused   HEAD:  [x  ] Normal, [  ] Other  EYES:  [x  ] EOMI, [x  ] PERRLA, [ x ] conjunctiva and sclera clear normal, [  ] Other,  [  ] Pallor,[  ] Discharge  ENMT:  [x  ] Normal, [ x ] Moist mucous membranes, [  ] Good dentition, [x ] No Thrush  NECK:  [ x ] Supple, [x  ] No JVD, [ x ] Normal thyroid, [  ] Lymphadenopathy [  ] Other  CHEST/LUNG:  [ x ] Clear to auscultation bilaterally, [ x ] Breath Sounds equal B/L [  ] poor effort  [x  ] No rales, [ x] No rhonchi  [ x ]  No wheezing,   HEART:  [x  ] Regular rate and rhythm, [  ] tachycardia, [  ] Bradycardia,  [  ] irregular  [ x ] No murmurs, No rubs, No gallops, [  ] PPM in place (Mfr:  )  ABDOMEN:  [ x ] Soft, [ x ] Nontender, [x  ] Nondistended, [ x ] No mass, [x  ] Bowel sounds present, [x  ] obese  MSK: [+] b/l posterior thigh pain, full ROM, nontender to palpation. [+] sacro coccygeal TTP. b/l knee non TTP. (-) Homans sign b/l  NERVOUS SYSTEM:  [ x ] Alert & Oriented X1 -2 (oriented to place, states president elect is Jesu, year is 2020, birthday however is correct month and day but thinks year is 2020), [ x ] Nonfocal  [  ] Confusion  [  ] Encephalopathic [  ] Sedated   EXTREMITIES: [x  ] 2+ Peripheral Pulses, No clubbing, No cyanosis,  [  ] edema B/L lower EXT. [  ] PVD stasis skin changes B/L Lower EXT, [  ] wound  LYMPH: No lymphadenopathy noted  SKIN:  [ x ] No rashes or lesions, [  ] Pressure Ulcers, [  ] ecchymosis, [  ] Skin Tears, [  ] Other    ---  CONSULTANTS:   Neurology Dr Jansen  Orthopedics Dr Gandhi  ---  TIME SPENT:  I, the attending physician, was physically present for the key portions of the evaluation and management (E/M) service provided. The total amount of time spent reviewing the hospital notes, laboratory values, imaging findings, assessing/counseling the patient, discussing with consultant physicians, social work, nursing staff was 41 minutes    ---  Primary care provider was made aware of plan for discharge:      [  ] NO     [x ] YES   FROM ADMISSION H+P:   HPI:  86y/o M with PMH of HTN, HLD, AS (s/p TAVR 2015), chronic pain (s/p knee and rt hip replacement), BPH, and dementia BIBEMS for a fall. Patient w/ recent admission in April for fall, found to have L hip fx s/p L hip hemiarthroplasty. Patient is a poor historian 2/2 dementia. He states that 2 weeks ago he fell backwards onto his buttock in his garage. Since then, patient has been experiencing sacral/coccygeal pain. He states that he has been compensating and shifting most of his weight to his R leg since the fall and has developed posterior R knee pain that extends to his R ankle. Patient had xrays of his knees and hip 5 days ago which were negative for acute fx. This AM, patient states that he fell in his house while walking. Fall was unwitnessed, patient unable to recall how he fell. He was ambulating without the use of an assistive device (usually uses walker). 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 He states that he was able to pull himself off the ground afterwards. Patient states that he is currently feeling 2/10 pain in his tailbone. Pain is nonradiating and worse when sitting for extended periods of time. He states he has been experiencing this pain since his fall 2 weeks ago. He feels well otherwise and denies any HA, dizziness, CP, cough, SOB, sick contacts, abdominal pain, n/v, dysuria, or LE swelling.     In the ED  VS- 97.1F, 92HR, 170/71, 16RR, O2 sat 100% on RA  Labs significant for Hb 10.9 (baseline 9), CMP WNL, UA positive for moderate blood and protein  XR b/l knee negative for acute fx or subluxation  XR b/l hip showed no acute fx, b/l hip hardware intact and unchanged  CXR grossly normal  CTH showed no acute findings  CT L spine showed no acute fracture. (+) chronic lumbosacral degenerative changes. Stenosis in levels L2-L5, worse at L4-L5 level  ECG NSR, no signs of acute ischemia  s/p toradol, tylenol, lidoderm patch, and 500cc NS in the ED (09 Dec 2020 15:41)      ---  HOSPITAL COURSE:   The patient was admitted to the [facility] for further evaluation of recurrent falls. All imaging was negative for any acute fracture or dislocation, though was consistent with some chronic generative changes of the LS spine. Neurology was consulted for both recurrent falls as well as dementia, and suggested that falls were likely 2/2 deconditioning. He also suggested adding seroquel 12.5mg qhs PRN agitation/confusion for his dementia. Orthopedics, Dr. Gandhi was consulted for low back pain and stated that there is a low suspicion for acute back etiology with negative lumbar CT for bony pathology in the setting of normal neurological exam and no radicular symptoms--Likely bone contusion/soft tissue strain.  Patient's pain was controlled during the admission. Initial PT evaluation at the time of admission recommended subacute rehab on discharge due to impaired mobility, poor balance. He continued to work with PT during his admission, and mobility continued to improve. Course was complicated by urinary retention, requiring alford catheter placement. He was likely in retention 2/2 constipation and thus was given enema after which he was able to pass sizeable bowel movement. He was stable for discharge to [facility] on 12/11/20. He was seen and evaluated on the day of discharge.     ---  CONSULTANTS:   Neurology Dr Jansen  Orthopedics Dr Gandhi  ---  TIME SPENT:  I, the attending physician, was physically present for the key portions of the evaluation and management (E/M) service provided. The total amount of time spent reviewing the hospital notes, laboratory values, imaging findings, assessing/counseling the patient, discussing with consultant physicians, social work, nursing staff was 45 minutes

## 2020-12-10 NOTE — CONSULT NOTE ADULT - ASSESSMENT
A/P: 85y Male with right posterior thigh and coccygeal pain s/p mechanical fall  - Negative workup thus far for any acute injuries, no evidence of fracture dislocation  - Albino  -Pain control  -NPO  -IVF while NPO  -NWB LE, bedrest  -Hold DVT ppx  -FU labs/imaging  -Medical clearance/optimization for OR  -Discussed above with Ortho Attending A/P: 85y Male with right posterior thigh and coccygeal pain s/p mechanical fall  - Negative workup thus far for any acute injuries, no evidence of fracture dislocation  - Low suspicion for acute back etiology with negative lumbar CT for bony pathology in the setting of normal neurological exam and no radicular symptoms  - Likely bone contusion/soft tissue strain   - Pain control  - WBAT  - PT/OT  - Medical Management per primary teams  - Orthopedically stable for d/c  - Will discuss with attending and change plan as needed

## 2020-12-10 NOTE — PROGRESS NOTE ADULT - PROBLEM SELECTOR PLAN 1
Patient w/ multiple recent falls, likely 2/2 deconditioning  -Recent hospitalization in April for L hip fx s/p hemiarthroplasty.  -Fall 2 weeks ago to buttock, c/o sacrococcygeal pain since. Mechanical fall this AM, no LOC or head trauma  -CTH with no acute findings  -XR b/l hip and knees showed no acute fx  -CT L spine negative for acute fx. (+) chronic lumbosacral degenerative changes. Stenosis in levels L2-L5, worse at L4-L5 level  -Pain control (tylenol/tramadol/tramadol for mild/mod/severe). Avoid dilaudid/oxycodone as patient noted to have severe agitation in past  -PT consult -- recommend ROD placement Patient w/ multiple recent falls, likely 2/2 deconditioning  -Recent hospitalization in April for L hip fx s/p hemiarthroplasty.  -Fall 2 weeks ago to buttock, c/o sacrococcygeal pain since. Mechanical fall this AM, no LOC or head trauma  -CTH with no acute findings  -XR b/l hip and knees showed no acute fx  -CT L spine negative for acute fx. (+) chronic lumbosacral degenerative changes. Stenosis in levels L2-L5, worse at L4-L5 level  -Pain control (tylenol/tramadol/tramadol for mild/mod/severe). Avoid dilaudid/oxycodone as patient noted to have severe agitation in past  -lidoderm patch to low back and R knee qd  -PT consult -- recommend ROD placement Patient w/ multiple recent falls, likely 2/2 deconditioning  -Recent hospitalization in April for L hip fx s/p hemiarthroplasty.  -Fall 2 weeks ago to buttock, c/o sacrococcygeal pain since. Mechanical fall this AM, no LOC or head trauma  -CTH with no acute findings  -XR b/l hip and knees showed no acute fx  -CT L spine negative for acute fx. (+) chronic lumbosacral degenerative changes. Stenosis in levels L2-L5, worse at L4-L5 level  -Pain control (tylenol/tramadol/tramadol for mild/mod/severe).  - Avoid dilaudid/oxycodone as patient noted to have severe agitation in past  -Lidoderm patch to low back and R knee qd  -PT consult -- recommend ROD placement

## 2020-12-10 NOTE — DIETITIAN INITIAL EVALUATION ADULT. - PROBLEM SELECTOR PLAN 2
Chronic  -Baseline AAOx2. Wife notes worsening confusion and agitation over the past few months  -Neuro, Dr. Jansen consulted, f/u recs  -avoid sedative medications

## 2020-12-10 NOTE — DISCHARGE NOTE PROVIDER - CARE PROVIDER_API CALL
Simeon Gandhi)  Orthopaedic Surgery Surgery  [address] [place] [address]  Phone: [phone]  Fax: [phone]  Established Patient  Follow Up Time: Routine    Alvin Underwood  UROLOGY  [address] [facility], [address]  [place] [address]  Phone: [phone]  Fax: [phone]  Established Patient  Follow Up Time: Routine    Omkar Dewey  [facility]  [address] [facility] [address]  [place] [address]  Phone: [phone]  Fax: (   )    -  Established Patient  Follow Up Time: Routine   Simeon Gandhi)  Orthopaedic Surgery Surgery  [address] [place] [address]  Phone: [phone]  Fax: [phone]  Established Patient  Follow Up Time: Routine    Alvin Underwood  UROLOGY  [address] [facility], [address]  [place] [address]  Phone: [phone]  Fax: [phone]  Established Patient  Follow Up Time: Routine    Omkar Dewey  [facility]  [address] [facility] [address]  [place] [address]  Phone: [phone]  Fax: (   )    -  Established Patient  Follow Up Time: Routine    Nelida Johnson  NEUROLOGY  [address] [place] [address]  Phone: [phone]  Fax: [phone]  Follow Up Time:    Simeon Gandhi)  Orthopaedic Surgery Surgery  [address] [place] [address]  Phone: [phone]  Fax: [phone]  Established Patient  Follow Up Time: Routine    Alvin Underwood  UROLOGY  [address] [facility], [address]  [place] [address]  Phone: [phone]  Fax: [phone]  Established Patient  Follow Up Time: 1 week    Nelida Johnson  NEUROLOGY  [address] [place] [address]  Phone: [phone]  Fax: [phone]  Follow Up Time:     Omkar Dewey  [facility] [place]  [address] [facility] [place] [address]  [place] [address]  Phone: [phone]  Fax: (   )    -  Established Patient  Follow Up Time: Routine   Simeon Gandhi)  Orthopaedic Surgery Surgery  [address] [place] [address]  Phone: [phone]  Fax: [phone]  Established Patient  Follow Up Time: Routine    Alvin Underwood  UROLOGY  [address] [facility], [address]  [place] [address]  Phone: [phone]  Fax: [phone]  Established Patient  Follow Up Time: 1 week    Nelida Johnson  NEUROLOGY  [address] [place] [address]  Phone: [phone]  Fax: [phone]  Follow Up Time:     Omkar Dewey  [facility] [place]  [address] [facility] [address]  [place] [address]  Phone: [phone]  Fax: (   )    -  Established Patient  Follow Up Time: Routine    Tucker Vyas  CARDIOVASCULAR DISEASE  [address] [place] [address]  [place] [address]  Phone: [phone]  Fax: [phone]  Follow Up Time:

## 2020-12-10 NOTE — PROGRESS NOTE ADULT - PROBLEM SELECTOR PLAN 5
Hx of chronic pain 2/2 b/l hip and R knee replacement  -On tramadol 50mg qd at home  -Pain control w/ tylenol and tramadol  -Lidocaine patch ordered  -Avoid dilaudid/oxycodone as patient noted to have severe agitation in past Hx of chronic pain 2/2 b/l hip and R knee replacement  -On tramadol 50mg qd at home  -Pain control w/ tylenol and tramadol  -Lidocaine patch ordered for low back and R knee  -Avoid dilaudid/oxycodone as patient noted to have severe agitation in past

## 2020-12-10 NOTE — DIETITIAN INITIAL EVALUATION ADULT. - ETIOLOGY
related to increased needs during wound healing related to decreased ability to consume sufficient energy

## 2020-12-10 NOTE — CONSULT NOTE ADULT - SUBJECTIVE AND OBJECTIVE BOX
84y/o M with PMH of HTN, HLD, AS (s/p TAVR 2015), BPH, and dementia. Patient w/ recent admission in April with L hip fx now s/p L hip hemiarthroplasty preformed by Dr. Gandhi. Patient is a poor historian 2/2 dementia. Patient with history of falls, recently falling 2 weeks ago in his garage onto his buttock in his garage, now with sacral/coccygeal pain and posterior thigh pain. Patient is a community ambulator at baseline with assistive devices. He denies any presyncope, head injury/LOC prior to the fall. Patient states that he is currently feeling R posterior thigh and coccygeal pain which is preventing him from ambulating. He denies low back pain or any RLE radicular symptoms. He denies fever/chills. He denies numbness/tingling.     Dorsalgia    Handoff    MEWS Score    Dementia    Anemia    S/P AVR (aortic valve replacement)    Mild CAD    Chronic pain    CAD (coronary artery disease)    High cholesterol    BPH (benign prostatic hyperplasia)    Hypertension    Fall at home    Back pain    S/P AVR (aortic valve replacement)    Need for prophylactic measure    Anxiety    Hypertension    High cholesterol    Chronic pain    BPH (benign prostatic hyperplasia)    Anemia    Dementia    Fall    S/P AVR (aortic valve replacement)    S/P AVR (aortic valve replacement) and aortoplasty    S/P mitral valve replacement    S/P mitral valve replacement with tissue valve    History of total hip replacement, right    History of total right knee replacement (TKR)    High cholesterol    History of BPH    HTN (hypertension)    FALL    90+    BPH (benign prostatic hyperplasia)    Chronic pain    Hypertension    Anxiety    S/P AVR (aortic valve replacement)    Fall at home    Dehydration    [email or web address]      PAST MEDICAL & SURGICAL HISTORY:  Dementia    Anemia    S/P AVR (aortic valve replacement)    Mild CAD  Non Obstructive CAD    Chronic pain  s/p knee and hip replacements    High cholesterol    BPH (benign prostatic hyperplasia)    Hypertension    S/P AVR (aortic valve replacement)    History of total hip replacement, right    History of total right knee replacement (TKR)  X2      MEDICATIONS  (STANDING):  atorvastatin 10 milliGRAM(s) Oral at bedtime  cholecalciferol 2000 Unit(s) Oral daily  enoxaparin Injectable 40 milliGRAM(s) SubCutaneous daily  escitalopram 20 milliGRAM(s) Oral daily  finasteride 5 milliGRAM(s) Oral daily  omega-3-Acid Ethyl Esters 4 Gram(s) Oral daily  polyethylene glycol 3350 17 Gram(s) Oral two times a day  senna 2 Tablet(s) Oral at bedtime    Allergies    No Known Allergies    Intolerances          Vital Signs Last 24 Hrs  T(C): 36.4 (12-10-20 @ 05:29), Max: 36.8 (12-09-20 @ 15:50)  T(F): 97.5 (12-10-20 @ 05:29), Max: 98.3 (12-09-20 @ 15:50)  HR: 72 (12-10-20 @ 05:29) (72 - 92)  BP: 167/76 (12-10-20 @ 05:29) (152/80 - 170/71)  BP(mean): --  RR: 17 (12-10-20 @ 05:29) (16 - 17)  SpO2: 90% (12-10-20 @ 05:29) (90% - 100%)    Physical Exam  General: NAD, resting comfortably  RLE:   Skin intact, well healed surgical incisions over the right hip and knee  Non-tender to palpation over the thigh musculature  TTP over the ischial tuberosity, coccyx   Flexes knee/hip to 90 degrees  Patient able to SLR  No hip pain with axial loading, micromotion   +TA/EHL/FHL/GS  SILT L2-S1  + Pulses  Compartments soft and compressible  Calves NTTP b/l    Secondary Assessment:  NC/AT, NTTP of clavicles, NTTP of C-,T-,L-Spine, NTTP of Pelvis  UEs: NTTP of Shoulders, Elbows, Wrists, Hands; NT with AROM/PROM of Shoulders, Elbows, Wrists, Hands; AIN/PIN/Med/Uln/Msc/Rad/Ax intact  LEs: Able to SLR, NT with Log Roll, NT with Heel Strike, NTTP of Hips, Knees, Ankles, Feet; NT with AROM/PROM of Hips, Knees, Ankles, Feet; Q/H/Gsc/TA/EHL/FHL intact                            10.9   9.22  )-----------( 225      ( 09 Dec 2020 12:21 )             33.2     09 Dec 2020 12:21    139    |  103    |  23     ----------------------------<  92     4.0     |  28     |  1.30     Ca    9.2        09 Dec 2020 12:21    TPro  7.1    /  Alb  3.2    /  TBili  0.6    /  DBili  x      /  AST  41     /  ALT  39     /  AlkPhos  111    09 Dec 2020 12:21        Imaging  XR Pelvis/R Hip: Negative for acute fracture/dislocation, stable hip prostheses  XR R Knee: Negative for acute fracture/dislocation, chondrocalcinosis   CT LSp: 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

## 2020-12-10 NOTE — DIETITIAN INITIAL EVALUATION ADULT. - SIGNS/SYMPTOMS
as evidenced by stage II and stage II pressure injury to toes/feet. as evidenced by dementia/depression affecting po intake.

## 2020-12-10 NOTE — DIETITIAN INITIAL EVALUATION ADULT. - ADD RECOMMEND
Ensure enlive 8oz po daily. MVI with minerals daily. 500mg VIt C BID. Ensure enlive 8oz po BID. MVI with minerals daily. 500mg VIt C BID.

## 2020-12-10 NOTE — DISCHARGE NOTE PROVIDER - NSDCFUADDINST_GEN_ALL_CORE_FT
Follow up with your primary care doctor within 1 week of discharge Follow up with your primary care doctor within 1 week of discharge Dr Lilliam Ceballos  Follow up with Urology in 1 week after d/c from [facility]

## 2020-12-11 ENCOUNTER — TRANSCRIPTION ENCOUNTER (OUTPATIENT)
Age: 85
End: 2020-12-11

## 2020-12-11 VITALS
RESPIRATION RATE: 16 BRPM | TEMPERATURE: 98 F | DIASTOLIC BLOOD PRESSURE: 78 MMHG | OXYGEN SATURATION: 97 % | HEART RATE: 86 BPM | SYSTOLIC BLOOD PRESSURE: 172 MMHG

## 2020-12-11 DIAGNOSIS — R33.9 RETENTION OF URINE, UNSPECIFIED: ICD-10-CM

## 2020-12-11 LAB
ANION GAP SERPL CALC-SCNC: 6 MMOL/L — SIGNIFICANT CHANGE UP (ref 5–17)
BUN SERPL-MCNC: 23 MG/DL — SIGNIFICANT CHANGE UP (ref 7–23)
CALCIUM SERPL-MCNC: 8.7 MG/DL — SIGNIFICANT CHANGE UP (ref 8.5–10.1)
CHLORIDE SERPL-SCNC: 106 MMOL/L — SIGNIFICANT CHANGE UP (ref 96–108)
CO2 SERPL-SCNC: 29 MMOL/L — SIGNIFICANT CHANGE UP (ref 22–31)
CREAT SERPL-MCNC: 1.1 MG/DL — SIGNIFICANT CHANGE UP (ref 0.5–1.3)
FOLATE SERPL-MCNC: 9.9 NG/ML — SIGNIFICANT CHANGE UP
GLUCOSE SERPL-MCNC: 78 MG/DL — SIGNIFICANT CHANGE UP (ref 70–99)
HCT VFR BLD CALC: 33.7 % — LOW (ref 39–50)
HGB BLD-MCNC: 10.9 G/DL — LOW (ref 13–17)
MCHC RBC-ENTMCNC: 29.7 PG — SIGNIFICANT CHANGE UP (ref 27–34)
MCHC RBC-ENTMCNC: 32.3 GM/DL — SIGNIFICANT CHANGE UP (ref 32–36)
MCV RBC AUTO: 91.8 FL — SIGNIFICANT CHANGE UP (ref 80–100)
NRBC # BLD: 0 /100 WBCS — SIGNIFICANT CHANGE UP (ref 0–0)
PLATELET # BLD AUTO: 257 K/UL — SIGNIFICANT CHANGE UP (ref 150–400)
POTASSIUM SERPL-MCNC: 4.5 MMOL/L — SIGNIFICANT CHANGE UP (ref 3.5–5.3)
POTASSIUM SERPL-SCNC: 4.5 MMOL/L — SIGNIFICANT CHANGE UP (ref 3.5–5.3)
RBC # BLD: 3.67 M/UL — LOW (ref 4.2–5.8)
RBC # FLD: 11.6 % — SIGNIFICANT CHANGE UP (ref 10.3–14.5)
SODIUM SERPL-SCNC: 141 MMOL/L — SIGNIFICANT CHANGE UP (ref 135–145)
TSH SERPL-MCNC: 1.68 UIU/ML — SIGNIFICANT CHANGE UP (ref 0.36–3.74)
VIT B12 SERPL-MCNC: 757 PG/ML — SIGNIFICANT CHANGE UP (ref 232–1245)
WBC # BLD: 9.41 K/UL — SIGNIFICANT CHANGE UP (ref 3.8–10.5)
WBC # FLD AUTO: 9.41 K/UL — SIGNIFICANT CHANGE UP (ref 3.8–10.5)

## 2020-12-11 RX ORDER — QUETIAPINE FUMARATE 200 MG/1
12.5 TABLET, FILM COATED ORAL
Qty: 0 | Refills: 0 | DISCHARGE
Start: 2020-12-11

## 2020-12-11 RX ORDER — OXYCODONE HYDROCHLORIDE 5 MG/1
1 TABLET ORAL
Qty: 0 | Refills: 0 | DISCHARGE
Start: 2020-12-11

## 2020-12-11 RX ORDER — MULTIVIT-MIN/FERROUS GLUCONATE 9 MG/15 ML
1 LIQUID (ML) ORAL
Qty: 0 | Refills: 0 | DISCHARGE
Start: 2020-12-11

## 2020-12-11 RX ORDER — ASCORBIC ACID 60 MG
1 TABLET,CHEWABLE ORAL
Qty: 0 | Refills: 0 | DISCHARGE
Start: 2020-12-11

## 2020-12-11 RX ORDER — SENNA PLUS 8.6 MG/1
2 TABLET ORAL
Qty: 0 | Refills: 0 | DISCHARGE
Start: 2020-12-11

## 2020-12-11 RX ORDER — TAMSULOSIN HYDROCHLORIDE 0.4 MG/1
0.4 CAPSULE ORAL AT BEDTIME
Refills: 0 | Status: DISCONTINUED | OUTPATIENT
Start: 2020-12-11 | End: 2020-12-11

## 2020-12-11 RX ORDER — TRAMADOL HYDROCHLORIDE 50 MG/1
1 TABLET ORAL
Qty: 0 | Refills: 0 | DISCHARGE
Start: 2020-12-11

## 2020-12-11 RX ORDER — TRAMADOL HYDROCHLORIDE 50 MG/1
0.5 TABLET ORAL
Qty: 0 | Refills: 0 | DISCHARGE
Start: 2020-12-11

## 2020-12-11 RX ORDER — GABAPENTIN 400 MG/1
1 CAPSULE ORAL
Qty: 0 | Refills: 0 | DISCHARGE
Start: 2020-12-11

## 2020-12-11 RX ORDER — LIDOCAINE 4 G/100G
0 CREAM TOPICAL
Qty: 0 | Refills: 0 | DISCHARGE
Start: 2020-12-11

## 2020-12-11 RX ORDER — MINERAL OIL
133 OIL (ML) MISCELLANEOUS ONCE
Refills: 0 | Status: COMPLETED | OUTPATIENT
Start: 2020-12-11 | End: 2020-12-11

## 2020-12-11 RX ORDER — ACETAMINOPHEN 500 MG
2 TABLET ORAL
Qty: 0 | Refills: 0 | DISCHARGE
Start: 2020-12-11

## 2020-12-11 RX ORDER — LIDOCAINE 4 G/100G
1 CREAM TOPICAL
Qty: 0 | Refills: 0 | DISCHARGE
Start: 2020-12-11

## 2020-12-11 RX ORDER — TAMSULOSIN HYDROCHLORIDE 0.4 MG/1
1 CAPSULE ORAL
Qty: 0 | Refills: 0 | DISCHARGE
Start: 2020-12-11

## 2020-12-11 RX ORDER — GABAPENTIN 400 MG/1
300 CAPSULE ORAL AT BEDTIME
Refills: 0 | Status: DISCONTINUED | OUTPATIENT
Start: 2020-12-11 | End: 2020-12-11

## 2020-12-11 RX ORDER — TRAMADOL HYDROCHLORIDE 50 MG/1
1 TABLET ORAL
Qty: 0 | Refills: 0 | DISCHARGE

## 2020-12-11 RX ORDER — ENOXAPARIN SODIUM 100 MG/ML
40 INJECTION SUBCUTANEOUS
Qty: 0 | Refills: 0 | DISCHARGE
Start: 2020-12-11

## 2020-12-11 RX ADMIN — Medication 500 MILLIGRAM(S): at 05:01

## 2020-12-11 RX ADMIN — Medication 650 MILLIGRAM(S): at 05:03

## 2020-12-11 RX ADMIN — Medication 650 MILLIGRAM(S): at 17:01

## 2020-12-11 RX ADMIN — Medication 133 MILLILITER(S): at 15:09

## 2020-12-11 RX ADMIN — OXYCODONE HYDROCHLORIDE 5 MILLIGRAM(S): 5 TABLET ORAL at 09:08

## 2020-12-11 RX ADMIN — Medication 650 MILLIGRAM(S): at 18:00

## 2020-12-11 RX ADMIN — Medication 2000 UNIT(S): at 12:24

## 2020-12-11 RX ADMIN — Medication 650 MILLIGRAM(S): at 05:01

## 2020-12-11 RX ADMIN — Medication 10 MILLIGRAM(S): at 17:55

## 2020-12-11 RX ADMIN — Medication 500 MILLIGRAM(S): at 17:01

## 2020-12-11 RX ADMIN — OXYCODONE HYDROCHLORIDE 5 MILLIGRAM(S): 5 TABLET ORAL at 15:09

## 2020-12-11 RX ADMIN — Medication 1 TABLET(S): at 12:24

## 2020-12-11 RX ADMIN — ENOXAPARIN SODIUM 40 MILLIGRAM(S): 100 INJECTION SUBCUTANEOUS at 12:24

## 2020-12-11 RX ADMIN — POLYETHYLENE GLYCOL 3350 17 GRAM(S): 17 POWDER, FOR SOLUTION ORAL at 05:01

## 2020-12-11 RX ADMIN — OXYCODONE HYDROCHLORIDE 5 MILLIGRAM(S): 5 TABLET ORAL at 16:09

## 2020-12-11 RX ADMIN — OXYCODONE HYDROCHLORIDE 5 MILLIGRAM(S): 5 TABLET ORAL at 08:08

## 2020-12-11 RX ADMIN — LIDOCAINE 1 PATCH: 4 CREAM TOPICAL at 12:24

## 2020-12-11 RX ADMIN — TRAMADOL HYDROCHLORIDE 50 MILLIGRAM(S): 50 TABLET ORAL at 02:28

## 2020-12-11 RX ADMIN — FINASTERIDE 5 MILLIGRAM(S): 5 TABLET, FILM COATED ORAL at 12:24

## 2020-12-11 RX ADMIN — TRAMADOL HYDROCHLORIDE 50 MILLIGRAM(S): 50 TABLET ORAL at 01:28

## 2020-12-11 RX ADMIN — Medication 4 GRAM(S): at 12:24

## 2020-12-11 RX ADMIN — ESCITALOPRAM OXALATE 20 MILLIGRAM(S): 10 TABLET, FILM COATED ORAL at 12:24

## 2020-12-11 NOTE — PROGRESS NOTE ADULT - PROBLEM SELECTOR PLAN 4
Chronic  -resume home finasteride Hb 10.9 on admission, normocytic.   -Baseline appears to be 9. Required transfusions in last hospital stay (April 2020)  -Continue to trend H&H  -transfuse Hb >8

## 2020-12-11 NOTE — PROGRESS NOTE ADULT - PROBLEM SELECTOR PLAN 3
Hb 10.9 on admission, normocytic.   -Baseline appears to be 9. Required transfusions in last hospital stay (April 2020)  -Continue to trend H&H  -transfuse Hb >8 Chronic  -Baseline AAOx2. Wife notes worsening confusion and agitation over the past few months  -Neuro, Dr. Jansen consulted  -avoid sedative medications

## 2020-12-11 NOTE — OCCUPATIONAL THERAPY INITIAL EVALUATION ADULT - ADDITIONAL COMMENTS
Patient is an unreliable historian. As per EMR and patient report; patient ambulated using a rolling walker. Spouse assisted patient with ADL's occasionally. Patient has a stall shower with grab bars. Patient with hx of recent falls. Patient performed sit to stand with min assist and ambulated using rolling walker with min/contact guard and +chair follow. Patient requires assistance with ADL's and transfers due to impaired cognition, decreased strength, decreased endurance and impaired sitting/standing balance.

## 2020-12-11 NOTE — PROGRESS NOTE ADULT - ATTENDING COMMENTS
pt seen, examined , case & care plan d/w pt, resident at detail.  D/W wife Kalani at [phone] at detail,  Social service d/w  ROD , RN can manage pt for Placement for [place] cath if needed for urinary retention at [facility] D/W wife at detail. Pt is NOT on BB Or ASA as per Cardiologist.  D/C to [facility] today.  Total D/C care time is 45 minutes

## 2020-12-11 NOTE — PROGRESS NOTE ADULT - SUBJECTIVE AND OBJECTIVE BOX
Patient is a 85y old  Male who presents with a chief complaint of Fall (10 Dec 2020 11:40)    HPI:  84y/o M with PMH of HTN, HLD, AS (s/p TAVR ), chronic pain (s/p knee and rt hip replacement), BPH, and dementia BIBEMS for a fall. Patient w/ recent admission in April for fall, found to have L hip fx s/p L hip hemiarthroplasty. Patient is a poor historian 2/2 dementia. He states that 2 weeks ago he fell backwards onto his buttock in his garage. Since then, patient has been experiencing sacral/coccygeal pain. He states that he has been compensating and shifting most of his weight to his R leg since the fall and has developed posterior R knee pain that extends to his R ankle. Patient had xrays of his knees and hip 5 days ago which were negative for acute fx. This AM, patient states that he fell in his house while walking. Fall was unwitnessed, patient unable to recall how he fell. He was ambulating without the use of an assistive device (usually uses walker). 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 He states that he was able to pull himself off the ground afterwards. Patient states that he is currently feeling 2/10 pain in his tailbone. Pain is nonradiating and worse when sitting for extended periods of time. He states he has been experiencing this pain since his fall 2 weeks ago. He feels well otherwise and denies any HA, dizziness, CP, cough, SOB, sick contacts, abdominal pain, n/v, dysuria, or LE swelling.     In the ED  VS- 97.1F, 92HR, 170/71, 16RR, O2 sat 100% on RA  Labs significant for Hb 10.9 (baseline 9), CMP WNL, UA positive for moderate blood and protein  XR b/l knee negative for acute fx or subluxation  XR b/l hip showed no acute fx, b/l hip hardware intact and unchanged  CXR grossly normal  CTH showed no acute findings  CT L spine showed no acute fracture. (+) chronic lumbosacral degenerative changes. Stenosis in levels L2-L5, worse at L4-L5 level  ECG NSR, no signs of acute ischemia  s/p toradol, tylenol, lidoderm patch, and 500cc NS in the ED (09 Dec 2020 15:41)    INTERVAL HPI:  12/10: Patient seen and examined at bedside. No events overnight. Patient on bedpan leaning on R side. After helping him off bedpan he complained of L posterior thigh pain and "10/10" R knee pain. (-) homans sign b/l. R knee full ROM, nontender to palpation. Ordered tramadol 75mg x1. Patient denies any HA, dizziness, CP, SOB, n/v, or abdominal pain. PT-- > ROD, D/W  Wife -HCP, MOLST form d/w wife, pt is DNR/DNI    : Seen and examined at bedside. No overnight events. Patient resting comfortably in bed but complaining of 8/10 b/l posterior thigh pain and b/l knee pain. Pain is burning in quality and notes a sensation of "pins and needles" at times. Will start gabapentin. Patient denies any HA, dizziness, CP, SOB, n/v, or abdominal pain.     OVERNIGHT EVENTS: No events overnight    Home Medications:  atorvastatin 10 mg oral tablet: 1 tab(s) orally once a day (09 Dec 2020 16:50)  escitalopram 20 mg oral tablet: 1 tab(s) orally once a day (09 Dec 2020 16:50)  finasteride 5 mg oral tablet: 1 tab(s) orally once a day (09 Dec 2020 16:50)  mometasone 0.1% topical solution: Apply to Affected Area once a day  (09 Dec 2020 16:50)  Omega-3 oral capsule: 1 cap(s) orally once a day (09 Dec 2020 16:50)  polyethylene glycol 3350 oral powder for reconstitution: 17 gram(s) orally 2 times a day (09 Dec 2020 16:50)  traMADol 50 mg oral tablet: 1 tab(s) orally every 8 hours, As Needed for pain (09 Dec 2020 16:50)  Vitamin D3 2000 intl units (50 mcg) oral tablet: 1 tab(s) orally once a day (09 Dec 2020 16:50)      MEDICATIONS  (STANDING):  acetaminophen   Tablet .. 650 milliGRAM(s) Oral every 12 hours  ascorbic acid 500 milliGRAM(s) Oral two times a day  atorvastatin 10 milliGRAM(s) Oral at bedtime  cholecalciferol 2000 Unit(s) Oral daily  enoxaparin Injectable 40 milliGRAM(s) SubCutaneous daily  escitalopram 20 milliGRAM(s) Oral daily  finasteride 5 milliGRAM(s) Oral daily  lidocaine   Patch 1 Patch Transdermal daily  multivitamin/minerals 1 Tablet(s) Oral daily  omega-3-Acid Ethyl Esters 4 Gram(s) Oral daily  polyethylene glycol 3350 17 Gram(s) Oral two times a day  senna 2 Tablet(s) Oral at bedtime    MEDICATIONS  (PRN):  bisacodyl Suppository 10 milliGRAM(s) Rectal daily PRN Constipation  lidocaine   Patch 1 Patch Transdermal daily PRN low back pain  oxyCODONE    IR 5 milliGRAM(s) Oral every 6 hours PRN Severe Pain (7 - 10)  QUEtiapine 12.5 milliGRAM(s) Oral at bedtime PRN agitation  traMADol 25 milliGRAM(s) Oral every 6 hours PRN Mild Pain (1 - 3)  traMADol 50 milliGRAM(s) Oral every 6 hours PRN Moderate Pain (4 - 6)      Allergies    No Known Allergies    Intolerances        Social History:  Lives w/ wife in ranch style house  Retired, owned a printing store.  Ambulates with walker  Per wife, "easily confused."    Denies current or past tobacco use.  Infrequent etoh use.  Denies any illicit drug use.  CBD pills for pain. (09 Dec 2020 15:41)      REVIEW OF SYSTEMS:  CONSTITUTIONAL: No fever, No chills, No fatigue, No myalgia, No Body ache, No Weakness  EYES: No eye pain,  No visual disturbances, No discharge, NO Redness  ENMT:  No ear pain, No nose bleed, No vertigo; No sinus pain, NO throat pain, No Congestion  NECK: No pain, No stiffness  RESPIRATORY: No cough, NO wheezing, No  hemoptysis, NO  shortness of breath  CARDIOVASCULAR: No chest pain, palpitations  GASTROINTESTINAL: No abdominal pain, NO epigastric pain. No nausea, No vomiting; No diarrhea, No constipation. [  ] BM  GENITOURINARY: No dysuria, No frequency, No urgency, No hematuria, NO incontinence  NEUROLOGICAL: No headaches, No dizziness, No numbness, No tingling, No tremors, No weakness  EXT: No Swelling No Edema  SKIN:  [  ] No itching, burning, rashes, or lesions   MUSCULOSKELETAL: No joint pain ,No Jt swelling; [+] b/l posterior thigh pain, low back pain, and b/l knee pain.  PSYCHIATRIC: No depression,  No anxiety,  No mood swings ,No difficulty sleeping at night  PAIN SCALE: [  ] None  [ x ] Other- 8/10 b/l posterior thigh pain and b/l knee pain  REST OF REVIEW Of SYSTEM - [ x ] Normal     Vital Signs Last 24 Hrs  T(C): 36.7 (11 Dec 2020 05:35), Max: 36.7 (10 Dec 2020 13:25)  T(F): 98.1 (11 Dec 2020 05:35), Max: 98.1 (10 Dec 2020 13:25)  HR: 82 (11 Dec 2020 05:35) (82 - 86)  BP: 170/83 (11 Dec 2020 05:35) (137/89 - 170/83)  BP(mean): --  RR: 18 (11 Dec 2020 05:35) (17 - 19)  SpO2: 92% (11 Dec 2020 05:35) (92% - 99%)  Finger Stick        12-10 @ 07:01  -  12- @ 07:00  --------------------------------------------------------  IN: 460 mL / OUT: 150 mL / NET: 310 mL        PHYSICAL EXAM:  am ok  GENERAL:  [ x ] NAD , [ x ] well appearing, [  ] Agitated, [  ] Drowsy,  [  ] Lethargy, [  ] confused   HEAD:  [x  ] Normal, [  ] Other  EYES:  [x  ] EOMI, [x  ] PERRLA, [ x ] conjunctiva and sclera clear normal, [  ] Other,  [  ] Pallor,[  ] Discharge  ENMT:  [x  ] Normal, [ x ] Moist mucous membranes, [  ] Good dentition, [x ] No Thrush  NECK:  [ x ] Supple, [x  ] No JVD, [ x ] Normal thyroid, [  ] Lymphadenopathy [  ] Other  CHEST/LUNG:  [ x ] Clear to auscultation bilaterally, [ x ] Breath Sounds equal B/L [  ] poor effort  [x  ] No rales, [ x] No rhonchi  [ x ]  No wheezing,   HEART:  [x  ] Regular rate and rhythm, [  ] tachycardia, [  ] Bradycardia,  [  ] irregular  [ x ] No murmurs, No rubs, No gallops, [  ] PPM in place (Mfr:  )  ABDOMEN:  [ x ] Soft, [ x ] Nontender, [x  ] Nondistended, [ x ] No mass, [x  ] Bowel sounds present, [x  ] obese  MSK: [+] b/l posterior thigh pain, full ROM, nontender to palpation. [+] sacro coccygeal TTP. b/l knee non TTP. (-) Homans sign b/l  NERVOUS SYSTEM:  [ x ] Alert & Oriented X1 -2 (oriented to place, states president elect is Jesu, year is , birthday however is correct month and day but thinks year is ), [ x ] Nonfocal  [  ] Confusion  [  ] Encephalopathic [  ] Sedated   EXTREMITIES: [x  ] 2+ Peripheral Pulses, No clubbing, No cyanosis,  [  ] edema B/L lower EXT. [  ] PVD stasis skin changes B/L Lower EXT, [  ] wound  LYMPH: No lymphadenopathy noted  SKIN:  [ x ] No rashes or lesions, [  ] Pressure Ulcers, [  ] ecchymosis, [  ] Skin Tears, [  ] Other      DIET: Diet, Regular:   Low Sodium  Supplement Feeding Modality:  Oral  Ensure Enlive Cans or Servings Per Day:  1       Frequency:  Two Times a day (12-10-20 @ 14:43)      LABS:                        10.9   7.18  )-----------( 245      ( 10 Dec 2020 09:28 )             34.0     10 Dec 2020 09:28    140    |  104    |  24     ----------------------------<  109    3.5     |  26     |  1.10     Ca    9.1        10 Dec 2020 09:28  Phos  2.5       10 Dec 2020 09:28  Mg     2.1       10 Dec 2020 09:28    TPro  6.8    /  Alb  3.1    /  TBili  0.6    /  DBili  x      /  AST  54     /  ALT  41     /  AlkPhos  105    10 Dec 2020 09:28      Urinalysis Basic - ( 09 Dec 2020 12:21 )    Color: Yellow / Appearance: Slightly Turbid / S.010 / pH: x  Gluc: x / Ketone: Small  / Bili: Negative / Urobili: Negative   Blood: x / Protein: 30 mg/dL / Nitrite: Negative   Leuk Esterase: Negative / RBC: 6-10 /HPF / WBC 0-2   Sq Epi: x / Non Sq Epi: Occasional / Bacteria: x                           Anemia Panel:      Thyroid Panel:                RADIOLOGY & ADDITIONAL TESTS:      HEALTH ISSUES - PROBLEM Dx:  S/P AVR (aortic valve replacement)  S/P AVR (aortic valve replacement)    Need for prophylactic measure  Need for prophylactic measure    Anxiety  Anxiety    Hypertension  Hypertension    High cholesterol  High cholesterol    Chronic pain  Chronic pain    BPH (benign prostatic hyperplasia)  BPH (benign prostatic hyperplasia)    Anemia  Anemia    Dementia  Dementia    Fall  Fall            Consultant(s) Notes Reviewed:  [x  ] YES     Care Discussed with [X] Consultants  [  x] Patient  [ x ] Family- Wife [  ] HCP [  ]   [  ] Social Service  [ x ] RN, [  ] Physical Therapy,[  ] Palliative care team  DVT PPX: [x  ] Lovenox, [  ] S C Heparin, [  ] Coumadin, [  ] Xarelto, [  ] Eliquis, [  ] Pradaxa, [  ] IV Heparin drip, [  ] SCD [  ] Contraindication 2 to GI Bleed,[  ] Ambulation [  ] Contraindicated 2 to  bleed [  ] Contraindicated 2 to Brain Bleed  Advanced directive: [ x ] None, [  ] DNR/DNI

## 2020-12-11 NOTE — OCCUPATIONAL THERAPY INITIAL EVALUATION ADULT - GENERAL OBSERVATIONS, REHAB EVAL
Patient supine in bed with +bed alarm. Patient appears mildly confused though follows simple commands.

## 2020-12-11 NOTE — PROGRESS NOTE ADULT - PROBLEM SELECTOR PLAN 1
Patient w/ multiple recent falls, likely 2/2 deconditioning  -Recent hospitalization in April for L hip fx s/p hemiarthroplasty.  -Fall 2 weeks ago to buttock, c/o sacrococcygeal pain since. Mechanical fall this AM, no LOC or head trauma  -CTH with no acute findings  -XR b/l hip and knees showed no acute fx  -CT L spine negative for acute fx. (+) chronic lumbosacral degenerative changes. Stenosis in levels L2-L5, worse at L4-L5 level  -Pain control (tylenol/tramadol/tramadol for mild/mod/severe).  - Avoid dilaudid/oxycodone as patient noted to have severe agitation in past  -Lidoderm patch to low back and R knee qd  -Gabapentin 300mg qhs added  -Neuro, Dr. Jansen consulted--doubt neuro event, falls likely 2/2 deconditioning  -PT consult -- recommend ROD placement Pt with Urinary retention today  Bladder Scan done ~ ~400 ml, Straight Cath done, drained 300 ml   Rx Constipation , Mineral Oil enema x 1   Start Flomax 0.4 mg Q HS   -Out pt follow up with DR Underwood   -Bladder [facility] at [facility].

## 2020-12-11 NOTE — PROGRESS NOTE ADULT - ASSESSMENT
86y/o M with PMH of HTN, HLD, CAD (s/p open heart surgery 5yrs ago), chronic pain (s/p knee and rt hip replacement), BPH, mild confusion (per wife) BIBEMS to [facility] ED s/p fall. Patient w/ recent admission in April for fall, found to have L hip fx s/p L hip hemiarthroplasty admitted for fall

## 2020-12-11 NOTE — PROGRESS NOTE ADULT - PROBLEM SELECTOR PLAN 6
Chronic  -previously on metoprolol succinate, recently DCed by PCP Hx of chronic pain 2/2 b/l hip and R knee replacement  -On tramadol 50mg qd at home  -Pain control w/ tylenol , tramadol, Oxycodone   -Gabapentin 300mg qhs added  -Lidocaine patch ordered for low back and R knee  -Avoid dilaudid/oxycodone as patient noted to have severe agitation in past

## 2020-12-11 NOTE — PROGRESS NOTE ADULT - SUBJECTIVE AND OBJECTIVE BOX
Neurology follow up note    ISABELLE [id number]      Interval History:    Patient feels ok no new complaints less pain     MEDICATIONS    acetaminophen   Tablet .. 650 milliGRAM(s) Oral every 12 hours  ascorbic acid 500 milliGRAM(s) Oral two times a day  atorvastatin 10 milliGRAM(s) Oral at bedtime  bisacodyl Suppository 10 milliGRAM(s) Rectal daily PRN  cholecalciferol 2000 Unit(s) Oral daily  enoxaparin Injectable 40 milliGRAM(s) SubCutaneous daily  escitalopram 20 milliGRAM(s) Oral daily  finasteride 5 milliGRAM(s) Oral daily  gabapentin 300 milliGRAM(s) Oral at bedtime  lidocaine   Patch 1 Patch Transdermal daily PRN  lidocaine   Patch 1 Patch Transdermal daily  multivitamin/minerals 1 Tablet(s) Oral daily  omega-3-Acid Ethyl Esters 4 Gram(s) Oral daily  oxyCODONE    IR 5 milliGRAM(s) Oral every 6 hours PRN  polyethylene glycol 3350 17 Gram(s) Oral two times a day  QUEtiapine 12.5 milliGRAM(s) Oral at bedtime PRN  senna 2 Tablet(s) Oral at bedtime  traMADol 25 milliGRAM(s) Oral every 6 hours PRN  traMADol 50 milliGRAM(s) Oral every 6 hours PRN      Allergies    No Known Allergies    Intolerances            Vital Signs Last 24 Hrs  T(C): 36.7 (11 Dec 2020 05:35), Max: 36.7 (10 Dec 2020 13:25)  T(F): 98.1 (11 Dec 2020 05:35), Max: 98.1 (10 Dec 2020 13:25)  HR: 82 (11 Dec 2020 05:35) (82 - 86)  BP: 170/83 (11 Dec 2020 05:35) (137/89 - 170/83)  BP(mean): --  RR: 18 (11 Dec 2020 05:35) (17 - 19)  SpO2: 92% (11 Dec 2020 05:35) (92% - 99%)      REVIEW OF SYSTEMS:  Slightly limited secondary to the patient does have forgetfulness, but constitutionally, he denies fever, chills, or night sweats.  Head:  No headache.  Eyes:  No double vision or blurry vision.  Ears:  No ringing in the ears.  Neck:  No neck pain.  Respiratory:  No shortness of breath.  Cardiovascular:  No chest pain.  Abdomen:  No nausea, vomiting, or abdominal pain.  Extremities/Neurological:  No numbness or tingling.  Musculoskeletal:  Occasional joint pain.  Genitourinary:  No burning upon urination.  Psychiatric:  Psychiatry-wise, positive underlying anxiety which occasionally spouse has given Xanax for.    PHYSICAL EXAMINATION:    HEENT:  Head:  Normocephalic, atraumatic.  Eyes:  No scleral icterus.  Ears:  Hearing bilaterally intact.  NECK:  Supple.  RESPIRATORY:  Good air entry bilaterally.  CARDIOVASCULAR:  S1 and S2 heard.  ABDOMEN:  Soft and nontender.  EXTREMITIES:  No clubbing or cyanosis were noted.      NEUROLOGIC:  The patient is awake and alert.  Location was [facility], year was , was able to name simple objects.    Extraocular movements were intact.  Pupils were equal, round, and reactive bilaterally 3 mm to 2 mm.  Speech was fluent.  Smile was symmetric.  Motor:  Bilateral upper were 4+/5, bilateral lower were 4-/5.  The patient complains of less pain in his right hip.  The patient has a subtle bradykinesia.  No resting tremor was noted.  No cogwheel rigidity was noted.  The patient has slightly impaired proprioception bilateral feet.            LABS:  CBC Full  -  ( 11 Dec 2020 08:54 )  WBC Count : 9.41 K/uL  RBC Count : 3.67 M/uL  Hemoglobin : 10.9 g/dL  Hematocrit : 33.7 %  Platelet Count - Automated : 257 K/uL  Mean Cell Volume : 91.8 fl  Mean Cell Hemoglobin : 29.7 pg  Mean Cell Hemoglobin Concentration : 32.3 gm/dL  Auto Neutrophil # : x  Auto Lymphocyte # : x  Auto Monocyte # : x  Auto Eosinophil # : x  Auto Basophil # : x  Auto Neutrophil % : x  Auto Lymphocyte % : x  Auto Monocyte % : x  Auto Eosinophil % : x  Auto Basophil % : x    Urinalysis Basic - ( 09 Dec 2020 12:21 )    Color: Yellow / Appearance: Slightly Turbid / S.010 / pH: x  Gluc: x / Ketone: Small  / Bili: Negative / Urobili: Negative   Blood: x / Protein: 30 mg/dL / Nitrite: Negative   Leuk Esterase: Negative / RBC: 6-10 /HPF / WBC 0-2   Sq Epi: x / Non Sq Epi: Occasional / Bacteria: x      12-10    140  |  104  |  24<H>  ----------------------------<  109<H>  3.5   |  26  |  1.10    Ca    9.1      10 Dec 2020 09:28  Phos  2.5     12-10  Mg     2.1     12-10    TPro  6.8  /  Alb  3.1<L>  /  TBili  0.6  /  DBili  x   /  AST  54<H>  /  ALT  41  /  AlkPhos  105  12-10    Hemoglobin A1C:   Lipid Panel 12-10 @ 11:36  Total Cholesterol, Serum 145  LDL --  Triglycerides 53    LIVER FUNCTIONS - ( 10 Dec 2020 09:28 )  Alb: 3.1 g/dL / Pro: 6.8 g/dL / ALK PHOS: 105 U/L / ALT: 41 U/L / AST: 54 U/L / GGT: x           Vitamin B12         RADIOLOGY      ANALYSIS AND PLAN:  This is an 85-year-old with episodes of frequent falls and memory issues.  For frequent falls, suspect this is multifactorial secondary to age-related changes, questionable any subtle type of peripheral neuropathy and deconditioning.  The patient is mostly sedentary.  Also, history of poor balance.  I doubt that this is a primary cerebrovascular accident and there is no history to suggest that the patient is having syncopal events.  For history of dementia, would check TSH, vitamin B12, and folate.  For now, would recommend supportive therapy.  For history of high cholesterol, hyperlipidemia, continue the patient on his cholesterol medication.  For history of anxiety, continue the patient on home psychiatric medications.  I would recommend Physical Therapy evaluation.  Fall precaution.  I had a lengthy discussion with the spouse in detail.  Most likely, the patient will need rehab.  Spoke with the spouse, the patient does appear to suffer from sundowning.  If that occurs in the hospital, would recommend low-dose Seroquel 12.5 mg once a day.  Kalani, telephone number is [phone].  Alternate number; home, [phone]. 2020    Thank you for the courtesy of this consultation.    Greater than 45 minutes of time was spent with the patient, plan of care, reviewing data, speaking to the family and  50% of the visit was spent counseling and/or coordinating care with multidisciplinary healthcare team

## 2020-12-11 NOTE — PROGRESS NOTE ADULT - PROBLEM SELECTOR PLAN 2
Chronic  -Baseline AAOx2. Wife notes worsening confusion and agitation over the past few months  -Neuro, Dr. Jansen consulted  -avoid sedative medications Patient w/ multiple recent falls, likely 2/2 deconditioning  -Recent hospitalization in April for L hip fx s/p hemiarthroplasty.  -Fall 2 weeks ago to buttock, c/o sacrococcygeal pain since. Mechanical fall this AM, no LOC or head trauma  -CTH with no acute findings  -XR b/l hip and knees showed no acute fx  -CT L spine negative for acute fx. (+) chronic lumbosacral degenerative changes. Stenosis in levels L2-L5, worse at L4-L5 level  -Pain control (tylenol/tramadol/tramadol for mild/mod/severe).  - Avoid dilaudid/oxycodone as patient noted to have severe agitation in past  -Lidoderm patch to low back and R knee qd  -Gabapentin 300mg qhs added  -Neuro, Dr. Jansen consulted--doubt neuro event, falls likely 2/2 deconditioning  -PT consult -- recommend ROD placement

## 2020-12-11 NOTE — DISCHARGE NOTE NURSING/CASE MANAGEMENT/SOCIAL WORK - PATIENT PORTAL LINK FT
You can access the FollowMyHealth Patient Portal offered by [facility] by registering at the following website: [email or web address]. By joining [email or web address]’s FollowMyHealth portal, you will also be able to view your health information using other applications (apps) compatible with our system.

## 2020-12-11 NOTE — OCCUPATIONAL THERAPY INITIAL EVALUATION ADULT - PERTINENT HX OF CURRENT PROBLEM, REHAB EVAL
84y/o male with PMH of HTN, Left hip hemiarthroplasty 4/23/2020 (at [facility]), HLD, Non obstructive CAD, BPH, dementia, hx recent falls and mild confusion (per wife) BIBEMS to [facility] ED s/p fall at home. Pt admitted 12/09/2020 with dorsalgia. 86y/o male with PMH of HTN, right hip surgery, left hip hemiarthroplasty 4/23/2020 (at [facility]), HLD, Non obstructive CAD, BPH, dementia, hx recent falls and mild confusion (per wife) BIBEMS to [facility] ED s/p fall at home. Pt admitted 12/09/2020 with dorsalgia.

## 2020-12-11 NOTE — PROGRESS NOTE ADULT - PROBLEM SELECTOR PLAN 5
Hx of chronic pain 2/2 b/l hip and R knee replacement  -On tramadol 50mg qd at home  -Pain control w/ tylenol and tramadol  -Gabapentin 300mg qhs added  -Lidocaine patch ordered for low back and R knee  -Avoid dilaudid/oxycodone as patient noted to have severe agitation in past Chronic  -resume home finasteride  Rx Chronic Constipation with Senna, Miralax , Dulcolax & PRN Enema

## 2020-12-16 LAB
A-TOCOPHEROL VIT E SERPL-MCNC: 10.6 MG/L — SIGNIFICANT CHANGE UP (ref 9–29)
BETA+GAMMA TOCOPHEROL SERPL-MCNC: 0.8 MG/L — SIGNIFICANT CHANGE UP (ref 0.5–4.9)

## 2020-12-28 PROCEDURE — 84100 ASSAY OF PHOSPHORUS: CPT

## 2020-12-28 PROCEDURE — 84446 ASSAY OF VITAMIN E: CPT

## 2020-12-28 PROCEDURE — 85027 COMPLETE CBC AUTOMATED: CPT

## 2020-12-28 PROCEDURE — 83735 ASSAY OF MAGNESIUM: CPT

## 2020-12-28 PROCEDURE — 96361 HYDRATE IV INFUSION ADD-ON: CPT

## 2020-12-28 PROCEDURE — 85025 COMPLETE CBC W/AUTO DIFF WBC: CPT

## 2020-12-28 PROCEDURE — 97162 PT EVAL MOD COMPLEX 30 MIN: CPT

## 2020-12-28 PROCEDURE — 71045 X-RAY EXAM CHEST 1 VIEW: CPT

## 2020-12-28 PROCEDURE — 97166 OT EVAL MOD COMPLEX 45 MIN: CPT

## 2020-12-28 PROCEDURE — 70450 CT HEAD/BRAIN W/O DYE: CPT

## 2020-12-28 PROCEDURE — 73502 X-RAY EXAM HIP UNI 2-3 VIEWS: CPT

## 2020-12-28 PROCEDURE — U0003: CPT

## 2020-12-28 PROCEDURE — 72131 CT LUMBAR SPINE W/O DYE: CPT

## 2020-12-28 PROCEDURE — 81001 URINALYSIS AUTO W/SCOPE: CPT

## 2020-12-28 PROCEDURE — 97116 GAIT TRAINING THERAPY: CPT

## 2020-12-28 PROCEDURE — 80061 LIPID PANEL: CPT

## 2020-12-28 PROCEDURE — 97530 THERAPEUTIC ACTIVITIES: CPT

## 2020-12-28 PROCEDURE — 82746 ASSAY OF FOLIC ACID SERUM: CPT

## 2020-12-28 PROCEDURE — 73562 X-RAY EXAM OF KNEE 3: CPT

## 2020-12-28 PROCEDURE — 36415 COLL VENOUS BLD VENIPUNCTURE: CPT

## 2020-12-28 PROCEDURE — 72220 X-RAY EXAM SACRUM TAILBONE: CPT

## 2020-12-28 PROCEDURE — 99285 EMERGENCY DEPT VISIT HI MDM: CPT | Mod: 25

## 2020-12-28 PROCEDURE — 80053 COMPREHEN METABOLIC PANEL: CPT

## 2020-12-28 PROCEDURE — 93005 ELECTROCARDIOGRAM TRACING: CPT

## 2020-12-28 PROCEDURE — 84443 ASSAY THYROID STIM HORMONE: CPT

## 2020-12-28 PROCEDURE — 82607 VITAMIN B-12: CPT

## 2020-12-28 PROCEDURE — 86769 SARS-COV-2 COVID-19 ANTIBODY: CPT

## 2020-12-28 PROCEDURE — 80048 BASIC METABOLIC PNL TOTAL CA: CPT

## 2021-07-01 NOTE — DIETITIAN INITIAL EVALUATION ADULT. - OBTAIN WEEKLY WEIGHT
Discharge instructions reviewed with patient. Patient voices understanding. Patient advised to follow up as directed on discharge instructions. Patient denies questions, needs or concerns at discharge regarding discharge instructions. Patient voiced understanding. No s/sx of distress noted. Armband removed and placed in shredder box. yes

## 2021-07-15 NOTE — PROGRESS NOTE ADULT - PROBLEM/PLAN-7
Hypertension Hypertension Hypertension Hypertension Hypertension DISPLAY PLAN FREE TEXT Hypertension Hypertension Hypertension Hypertension Hypertension

## 2022-04-14 NOTE — ED PROVIDER NOTE - TIMING
EXAMINATION TYPE: XR shoulder complete LT

 

DATE OF EXAM: 4/14/2022

 

COMPARISON: NONE

 

HISTORY: Pain

 

TECHNIQUE: Three views are submitted.

 

FINDINGS:

The osseous structures are intact.  There is no acute fracture or dislocation. AC joint hypertrophic 
arthropathy. There is calcification along the humeral head can be associated with calcific tendinosis
..  

 

IMPRESSION:

1. AC joint arthropathy correlate for impingement. No definite acute fracture. Concern for rotator cu
ff disease correlate with MRI.

2. Correlate for calcific tendinosis. sudden onset

## 2022-06-01 NOTE — PROGRESS NOTE ADULT - PROBLEM SELECTOR PLAN 3
I spoke to Nikkie.  Na+ is still normal off of the NaCl pills so I think we should keep her off of them.  She still looks markedly prerenal, though Nikkie says she had 2 bottles of water before they came to draw the blood. (I am not sure I understand this still.)  She is feeling well - recent blood pressures are not low.  Will simply stay the course.  No further labs right now unless she does not feel well and we will see each other as scheduled in August leukocytosis likely reactive in setting of acute fracture  - Doubt infectious etiology as patient w/o viral symptoms or fever, hemodynamically stable at this time  - COVID negative

## 2022-06-24 PROBLEM — F03.90 UNSPECIFIED DEMENTIA WITHOUT BEHAVIORAL DISTURBANCE: Chronic | Status: ACTIVE | Noted: 2020-12-09

## 2022-07-09 ENCOUNTER — APPOINTMENT (OUTPATIENT)
Dept: ORTHOPEDIC SURGERY | Facility: CLINIC | Age: 87
End: 2022-07-09

## 2022-07-09 VITALS
HEART RATE: 77 BPM | WEIGHT: 180 LBS | DIASTOLIC BLOOD PRESSURE: 70 MMHG | HEIGHT: 68 IN | BODY MASS INDEX: 27.28 KG/M2 | SYSTOLIC BLOOD PRESSURE: 108 MMHG

## 2022-07-09 PROCEDURE — 73562 X-RAY EXAM OF KNEE 3: CPT | Mod: RT

## 2022-07-09 PROCEDURE — 99203 OFFICE O/P NEW LOW 30 MIN: CPT

## 2022-07-09 RX ORDER — HYDRALAZINE HYDROCHLORIDE 10 MG/1
10 TABLET ORAL
Qty: 180 | Refills: 0 | Status: ACTIVE | COMMUNITY
Start: 2022-03-09

## 2022-07-09 RX ORDER — TRAMADOL HYDROCHLORIDE 50 MG/1
50 TABLET, COATED ORAL
Qty: 60 | Refills: 0 | Status: ACTIVE | COMMUNITY
Start: 2022-03-22

## 2022-07-09 RX ORDER — CEPHALEXIN 500 MG/1
500 CAPSULE ORAL
Qty: 14 | Refills: 0 | Status: ACTIVE | COMMUNITY
Start: 2022-06-09

## 2022-07-09 RX ORDER — AMLODIPINE BESYLATE 5 MG/1
5 TABLET ORAL
Qty: 90 | Refills: 0 | Status: ACTIVE | COMMUNITY
Start: 2022-03-09

## 2022-07-09 RX ORDER — OXYCODONE 10 MG/1
10 TABLET ORAL
Qty: 60 | Refills: 0 | Status: ACTIVE | COMMUNITY
Start: 2022-06-28

## 2022-07-09 RX ORDER — OXYCODONE 5 MG/1
5 TABLET ORAL
Qty: 60 | Refills: 0 | Status: ACTIVE | COMMUNITY
Start: 2022-05-31

## 2022-07-09 RX ORDER — AMOXICILLIN 500 MG/1
500 TABLET, FILM COATED ORAL
Qty: 24 | Refills: 0 | Status: ACTIVE | COMMUNITY
Start: 2022-02-10

## 2022-07-09 RX ORDER — COVID-19 ANTIGEN TEST
KIT MISCELLANEOUS
Qty: 8 | Refills: 0 | Status: ACTIVE | COMMUNITY
Start: 2022-05-06

## 2022-08-21 NOTE — DISCHARGE NOTE NURSING/CASE MANAGEMENT/SOCIAL WORK - NSDCPEXARELTOREACT_GEN_ALL_CORE
Exploratory laparotomy, Closure of perforated duodenum using omental patch/yes Rivaroxaban/Xarelto increases your risk for bleeding. Notify your doctor if you experience any of the following side effects: unusual bleeding or bruising, vomiting blood or coffee ground-like material, red or black stool, itching or hives, chest tightness, trouble breathing, swelling in your face or hands, swelling in your mouth or throat, change in how much or how often you urinate, red or brown urine, heavy menstrual or vaginal bleeding, or blistering or peeling skin. When Rivaroxaban/Xarelto is taken with other medicines, they can affect how it works. Taking other medications such as aspirin, antibiotics, antifungals, blood thinners, nonsteroidal anti-inflammatories, and medications that treat depression can increase your risk of bleeding. It is very important to tell your health care provider about all of the other medicines, including over-the-counter medications, herbs, and vitamins you are taking.  DO NOT start, stop, or change the dosage of any medicine, including over-the-counter medicines, vitamins, and herbal products without your doctor’s approval.  Any products containing aspirin or are nonsteroidal anti-inflammatories lessen the blood’s ability to form clots and adds to the effect of Rivaroxaban/Xarelto. Never take aspirin or medicines that contain aspirin without speaking to your doctor.

## 2022-08-31 NOTE — PROGRESS NOTE ADULT - PROBLEM/PLAN-5
DISPLAY PLAN FREE TEXT
Statement Selected
Discharged

## 2023-01-01 NOTE — CONSULT NOTE ADULT - SUBJECTIVE AND OBJECTIVE BOX
[facility] Consultation    CHIEF COMPLAINT: Patient is a 85y old  Male who presents with a chief complaint of Left hip fracture (subcapital left femoral neck fracture)  Patients cell #: [phone] (23 Apr 2020 09:06)      HPI:  86y/o M with PMH of HTN, HLD, tissue AVR 2015 , NON OBSTRUCTIVE CAD, chronic pain (s/p knee and rt hip replacement), BPH, and mild confusion (per wife) [facility]S to [facility] s/p fall. Per wife, whom patient lives with, patient was ambulating with his cane when he lost balance and fell. Patient was in normal state of health prior to this time. Patient is often is "off balance." Wife was unable to get him off ground so called fire department. Fire department said he was fine, and put him into bed. Wife noticed his left hip looked like it was "sticking out" and leg appeared "out-turned." Wife was going to take patient for x-rays today, however early this morning, patient fell out of bed so she decided to call EMS. Patient w/ difficulty bearing weight. Denies LOC, headache, neck or back pain. Denies CP, palpitations, SOB, cough, n/v/d. Has been eating and drinking well. Patient has been quarantined with his wife in their home for 5 weeks.    Fever or chills: yes [  ]   no [ X]  Fatigue, malaise or generalized weakness: yes [  ]   no [ X ]  Shortness of breath/dyspnea: yes [  ]   no [  x]  Cough: yes [  ]   no [x  ]  Anorexia/po intolerance: yes [  ]   no [ x ]  Chest pain or chest tightness: yes [  ]   no [ x ]  Myalgias: yes [  ]   no [ x ]  Headache: yes [  ]   no [ x ]  Sore throat: yes [  ]   no [ x ]  Rhinorrhea: yes [  ]   no [x  ]  Abd pain: yes [  ]   no [ x ]  Nausea: yes [  ]   no [x  ]  Vomiting: yes [  ]   no [  x]  Diarrhea: yes [  ]   no [ x ]  Loss of sense of smell/anosmia: yes [  ]   no [x  ]  Conjunctivitis: yes [  ]   no [x  ]  Recent travel: yes [  ]   no [ x ]  Any sick contacts: yes [  ]   no [x  ]  Close contact with someone confirmed positive with COVID-19 / SARS-CoV2 in the last 14 days: yes [  ]   no [x  ]    In ED,  Vitals: T 98.9F, HR 83, /82, RR 16, SpO2 4L NC  Labs significant for: WBC 13.57, N/L ratio 15.9, H/H 9.4/30.1, BUN 46, Cr 1.8  EKG: NSR at 78bpm  CT head non cont:  no acute intracranial hemorrhage or mass effect.  CT cervical spine non cont: no evidence of cervical spine fracture.  CXR: no acute pulmonary process  Left hip xray: subcapital left femoral neck fracture, marked external rotation of hip, age indeterminant fracture deformity involving the left ischiopubic ramus  COVID PCR obtained while in ED. (23 Apr 2020 07:10)    **I spoke with Dr. Ty Mckeon's office at [place] and angiography in 2015 revealed severe aortic stenosis with minimal disease of the LAD, otherwise normal including normal left ventricular function. He did not need coronary artery bypass surgery and had minimally invasive aortic valve replacement. Echocardiography several years ago revealed normal ventricular function and a normally functioning bioprosthetic aortic valve    PAST MEDICAL & SURGICAL HISTORY:  Chronic pain: s/p knee and hip replacements  High cholesterol  BPH (benign prostatic hyperplasia)  Hypertension  s/p AVR  History of total hip replacement, right  History of total right knee replacement (TKR): X2      SOCIAL HISTORY: no tob/etoh    FAMILY HISTORY: no CAD      MEDICATIONS  (STANDING):  atorvastatin 10 milliGRAM(s) Oral at bedtime  escitalopram 20 milliGRAM(s) Oral daily  finasteride 5 milliGRAM(s) Oral daily  lactated ringers. 1000 milliLiter(s) (75 mL/Hr) IV Continuous <Continuous>  metoprolol succinate ER 50 milliGRAM(s) Oral daily    MEDICATIONS  (PRN):  acetaminophen   Tablet .. 650 milliGRAM(s) Oral every 4 hours PRN Mild Pain (1 - 3)  acetaminophen   Tablet .. 1000 milliGRAM(s) Oral every 6 hours PRN Moderate Pain (4 - 6)      Allergies    No Known Allergies    Intolerances        REVIEW OF SYSTEMS:    CONSTITUTIONAL: pos weakness, no fevers or chills  EYES: No visual changes, No diplopia  ENMT: No throat pain , No exudate  NECK: some pain and stiffness  RESPIRATORY: No cough, wheezing, hemoptysis; No shortness of breath  CARDIOVASCULAR: No chest pain or palpitations  GASTROINTESTINAL: No abdominal pain. No nausea, vomiting, or hematemesis; No diarrhea or constipation. No melena or hematochezia.  GENITOURINARY: No dysuria, frequency or hematuria  NEUROLOGICAL: No numbness or weakness  SKIN: No itching or rash  All other review of systems is negative unless indicated above    VITAL SIGNS:   Vital Signs Last 24 Hrs  T(C): 37.2 (23 Apr 2020 05:45), Max: 37.2 (23 Apr 2020 05:45)  T(F): 98.9 (23 Apr 2020 05:45), Max: 98.9 (23 Apr 2020 05:45)  HR: 83 (23 Apr 2020 05:45) (83 - 83)  BP: 158/82 (23 Apr 2020 05:45) (158/82 - 158/82)  BP(mean): --  RR: 16 (23 Apr 2020 05:45) (16 - 16)  SpO2: 100% (23 Apr 2020 05:45) (100% - 100%)        PHYSICAL EXAM:    Constitutional: NAD, awake and alert  Eyes:   Pupils round, no lesions  ENMT: no exudate or erythema  Pulmonary:  breath sounds are clear bilaterally, No wheezing, rales or rhonchi  Cardiovascular: PMI not palpable  Regular S1 and S2, no murmurs, rubs, gallops or clicks  Gastrointestinal: Bowel Sounds present, soft, nontender.   Lymph: No peripheral edema. No cervical lymphadenopathy.  Neurological: Alert, no focal deficits  Skin: No rashes. Changes of chronic venous stasis. No cyanosis.  Psych:  Mood & affect appropriate    LABS: All Labs Reviewed:                        9.4    13.57 )-----------( 168      ( 23 Apr 2020 06:17 )             30.1     23 Apr 2020 06:17    139    |  105    |  46     ----------------------------<  95     4.7     |  26     |  1.80     Ca    8.8        23 Apr 2020 06:17    TPro  7.6    /  Alb  4.0    /  TBili  0.8    /  DBili  x      /  AST  26     /  ALT  35     /  AlkPhos  76     23 Apr 2020 06:17    PT/INR - ( 23 Apr 2020 06:17 )   PT: 12.1 sec;   INR: 1.08 ratio         PTT - ( 23 Apr 2020 06:17 )  PTT:26.3 sec  CARDIAC MARKERS ( 23 Apr 2020 06:17 )  x     / x     / 365 U/L / x     / x            ECG: SR, Normal ECG        < from: Xray Chest 1 View- PORTABLE-Routine (04.23.20 @ 06:36) >    EXAM:  XR CHEST PORTABLE ROUTINE 1V                            PROCEDURE DATE:  04/23/2020          INTERPRETATION:  Chest portable.    Clinical History: Fall, left hip injury.    Comparison: none    Single AP view submitted.    The evaluation of the cardiomediastinal silhouette is limited on portable technique.  There are mediastinal surgical clips. There is a midline chest wall reconstruction plate.    No focal consolidation, effusion or pneumothorax is noted.    Evaluation for acute nondisplaced rib fractures is limited on this exam.  The evaluation of the spine is limited.      Impression:    No acute pulmonary process demonstrated.                      MULUGETA CHAVIRA M.D., ATTENDING RADIOLOGIST  This document has been electronically signed. Apr 23 2020  7:50AM                < end of copied text >
Patient is a 85y old  Male who presents with a chief complaint of Left hip fracture (subcapital left femoral neck fracture)  Patients cell #: [phone] (23 Apr 2020 17:31)      HPI:  86y/o M with PMH of HTN, HLD, Non obstructive CAD (s/p AVR ) 2015 , chronic pain (s/p knee and rt hip replacement), BPH, and mild confusion (per wife) BIBEMS to [facility] ED s/p fall. Per wife, whom patient lives with, patient was ambulating with his cane when he lost balance and fell. Patient was in normal state of health prior to this time. Patient is often is "off balance." Wife was unable to get him off ground so called fire department. Fire department said he was fine, and put him into bed. Wife noticed his left hip looked like it was "sticking out" and leg appeared "out-turned." Wife was going to take patient for x-rays today, however early this morning, patient fell out of bed so she decided to call EMS. Patient w/ difficulty bearing weight. Denies LOC, headache, neck or back pain. Denies CP, palpitations, SOB, cough, n/v/d. Has been eating and drinking well. Patient has been quarantined with his wife in their home for 5 weeks.    Fever or chills: yes [  ]   no [ X]  Fatigue, malaise or generalized weakness: yes [  ]   no [ X ]  Shortness of breath/dyspnea: yes [  ]   no [  x]  Cough: yes [  ]   no [x  ]  Anorexia/po intolerance: yes [  ]   no [ x ]  Chest pain or chest tightness: yes [  ]   no [ x ]  Myalgias: yes [  ]   no [ x ]  Headache: yes [  ]   no [ x ]  Sore throat: yes [  ]   no [ x ]  Rhinorrhea: yes [  ]   no [x  ]  Abd pain: yes [  ]   no [ x ]  Nausea: yes [  ]   no [x  ]  Vomiting: yes [  ]   no [  x]  Diarrhea: yes [  ]   no [ x ]  Loss of sense of smell/anosmia: yes [  ]   no [x  ]  Conjunctivitis: yes [  ]   no [x  ]  Recent travel: yes [  ]   no [ x ]  Any sick contacts: yes [  ]   no [x  ]  Close contact with someone confirmed positive with COVID-19 / SARS-CoV2 in the last 14 days: yes [  ]   no [x  ]    In ED,  Vitals: T 98.9F, HR 83, /82, RR 16, SpO2 4L NC  Labs significant for: WBC 13.57, N/L ratio 15.9, H/H 9.4/30.1, BUN 46, Cr 1.8  EKG: NSR at 78bpm  CT head non cont:  no acute intracranial hemorrhage or mass effect.  CT cervical spine non cont: no evidence of cervical spine fracture.  CXR: no acute pulmonary process  Left hip xray: subcapital left femoral neck fracture, marked external rotation of hip, age indeterminant fracture deformity involving the left ischiopubic ramus  COVID PCR obtained while in ED. Pt  C/O urinary hesitancy & difficulty on  urination. (23 Apr 2020 07:10)       ROS:  Negative except for:    PAST MEDICAL & SURGICAL HISTORY:  Anemia  S/P AVR (aortic valve replacement)  Mild CAD: Non Obstructive CAD  Chronic pain: s/p knee and hip replacements  High cholesterol  BPH (benign prostatic hyperplasia)  Hypertension  S/P AVR (aortic valve replacement)  History of total hip replacement, right  History of total right knee replacement (TKR): X2      SOCIAL HISTORY:    FAMILY HISTORY:      MEDICATIONS  (STANDING):  lactated ringers. 1000 milliLiter(s) (75 mL/Hr) IV Continuous <Continuous>    MEDICATIONS  (PRN):  HYDROmorphone  Injectable 0.5 milliGRAM(s) IV Push every 10 minutes PRN Severe Pain (7 - 10)  ondansetron Injectable 4 milliGRAM(s) IV Push once PRN Nausea and/or Vomiting  oxyCODONE    IR 5 milliGRAM(s) Oral once PRN Moderate Pain (4 - 6)      Allergies    No Known Allergies    Intolerances        Vital Signs Last 24 Hrs  T(C): 37.1 (23 Apr 2020 18:21), Max: 37.6 (23 Apr 2020 11:57)  T(F): 98.7 (23 Apr 2020 18:21), Max: 99.7 (23 Apr 2020 11:57)  HR: 72 (23 Apr 2020 18:21) (70 - 83)  BP: 121/67 (23 Apr 2020 18:21) (107/42 - 158/82)  BP(mean): --  RR: 15 (23 Apr 2020 18:21) (14 - 20)  SpO2: 100% (23 Apr 2020 18:21) (91% - 100%)    PHYSICAL EXAM  General: adult in NAD, post op in holding area  HEENT: clear oropharynx, anicteric sclera, pink conjunctivae  Neck: supple  CV: normal S1S2 with no murmur rubs or gallops  Lungs: clear to auscultation, no wheezes, no rhales  Abdomen: soft non-tender non-distended, no hepato/splenomegaly  Ext: no bleeding from surgical site  Skin: no rashes and no petichiae  Neuro: alert and oriented X3 no focal deficits      LABS:    CBC Full  -  ( 23 Apr 2020 18:02 )  WBC Count : 13.29 K/uL  RBC Count : 3.07 M/uL  Hemoglobin : 8.6 g/dL  Hematocrit : 28.2 %  Platelet Count - Automated : 150 K/uL  Mean Cell Volume : 91.9 fl  Mean Cell Hemoglobin : 28.0 pg  Mean Cell Hemoglobin Concentration : 30.5 gm/dL  Auto Neutrophil # : x  Auto Lymphocyte # : x  Auto Monocyte # : x  Auto Eosinophil # : x  Auto Basophil # : x  Auto Neutrophil % : x  Auto Lymphocyte % : x  Auto Monocyte % : x  Auto Eosinophil % : x  Auto Basophil % : x    04-23    139  |  105  |  46<H>  ----------------------------<  95  4.7   |  26  |  1.80<H>    Ca    8.8      23 Apr 2020 06:17    TPro  7.6  /  Alb  4.0  /  TBili  0.8  /  DBili  x   /  AST  26  /  ALT  35  /  AlkPhos  76  04-23    PT/INR - ( 23 Apr 2020 06:17 )   PT: 12.1 sec;   INR: 1.08 ratio         PTT - ( 23 Apr 2020 06:17 )  PTT:26.3 sec          BLOOD SMEAR INTERPRETATION:    RADIOLOGY & ADDITIONAL STUDIES:    < from: Xray Hip w/ Pelvis 2 or 3 Views, Left (04.23.20 @ 06:36) >  EXAM:  XR HIP WITH PELV 2-3V LT                            PROCEDURE DATE:  04/23/2020          INTERPRETATION:  Clinical information: Left hip pain following fall.    4 views, which includes the pelvis.    There is a subcapital left femoral neck fracture. There is marked external rotation of the hip.    Surgical clips overlie the left groin region.    Partially imaged is a noncemented right total hip arthroplasty with proximal femoral cerclage banding.    There is an age indeterminant fracturedeformity involving the left ischiopubic ramus.    Impression:    Findings as discussed above.                MULUGETA CHAVIRA M.D., ATTENDING RADIOLOGIST  This document has been electronically signed. Apr 23 2020  7:54AM    < end of copied text >
Patient is a 85yMale home ambulator with assistive devices who presents to [place] ED w/ a c/o of left hip pain s/p mf yesterday. Patient states fell yesterday onto left hip. Denies HS/LOC. Localizing pain to L hip fx, denies radiation of pain elsewhere. States inability to ambulate immediately following the injury. Denies any numbness or tingling. Denies having any other pain elsewhere. Ortho Hx R BETTY & R TKA at \[facility]"" >10 yrs ago. No other orthopaedic concerns at this time.    PAST MEDICAL & SURGICAL HISTORY:  Chronic pain: s/p knee and hip replacements  CAD (coronary artery disease): s/p open heart surgery 5yrs ago  High cholesterol  BPH (benign prostatic hyperplasia)  Hypertension  S/P mitral valve replacement  History of total hip replacement, right  History of total right knee replacement (TKR): X2    No Known Allergies    PHYSICAL EXAM:  T(C): 37.2 (04-23-20 @ 05:45), Max: 37.2 (04-23-20 @ 05:45)  HR: 83 (04-23-20 @ 05:45) (83 - 83)  BP: 158/82 (04-23-20 @ 05:45) (158/82 - 158/82)  RR: 16 (04-23-20 @ 05:45) (16 - 16)  SpO2: 100% (04-23-20 @ 05:45) (100% - 100%)    Gen: NAD, Resting comfortably  LLE:  Skin intact, no erythema or ecchymosis  + TTP about the hip  shortened/externally rotated.  +EHL/FHL/TA/GSC  +SILT L2-S1  + DP  Compartments soft and compressible  No calf tenderness    Secondary Survey:   No TTP over bony prominences, SILT, palpable pulses, full/painless A/PROM, compartments soft. No TTP over spinous processes or paraspinal muscles at C/T/L spine. No palpable step off. No other injuries or complaints.    Imaging:  XR L hip/pelv/femur demonstrates displaced left femoral neck fracture, age indeterminate inf pub ever fx. no other obvious fractures/dislocations appreciated.
Parent(s)

## 2023-02-08 ENCOUNTER — APPOINTMENT (OUTPATIENT)
Dept: ORTHOPEDIC SURGERY | Facility: CLINIC | Age: 88
End: 2023-02-08
Payer: MEDICARE

## 2023-02-08 ENCOUNTER — NON-APPOINTMENT (OUTPATIENT)
Age: 88
End: 2023-02-08

## 2023-02-08 DIAGNOSIS — M25.561 PAIN IN RIGHT KNEE: ICD-10-CM

## 2023-02-08 DIAGNOSIS — Z96.651 PRESENCE OF RIGHT ARTIFICIAL KNEE JOINT: ICD-10-CM

## 2023-02-08 DIAGNOSIS — M25.559 PAIN IN UNSPECIFIED HIP: ICD-10-CM

## 2023-02-08 DIAGNOSIS — Z91.81 HISTORY OF FALLING: ICD-10-CM

## 2023-02-08 PROCEDURE — 99212 OFFICE O/P EST SF 10 MIN: CPT

## 2023-02-08 PROCEDURE — 73562 X-RAY EXAM OF KNEE 3: CPT | Mod: RT

## 2023-02-08 PROCEDURE — 73502 X-RAY EXAM HIP UNI 2-3 VIEWS: CPT | Mod: RT

## 2023-02-08 RX ORDER — CELECOXIB 200 MG/1
200 CAPSULE ORAL
Qty: 42 | Refills: 0 | Status: ACTIVE | COMMUNITY
Start: 2023-02-08 | End: 1900-01-01

## 2023-02-20 ENCOUNTER — INPATIENT (INPATIENT)
Facility: HOSPITAL | Age: 88
LOS: 7 days | Discharge: HOSPICE | DRG: 377 | End: 2023-02-28
Attending: FAMILY MEDICINE | Admitting: HOSPITALIST
Payer: MEDICARE

## 2023-02-20 VITALS
DIASTOLIC BLOOD PRESSURE: 69 MMHG | TEMPERATURE: 98 F | OXYGEN SATURATION: 100 % | HEART RATE: 99 BPM | HEIGHT: 68 IN | SYSTOLIC BLOOD PRESSURE: 133 MMHG | RESPIRATION RATE: 15 BRPM | WEIGHT: 179.9 LBS

## 2023-02-20 DIAGNOSIS — Z96.641 PRESENCE OF RIGHT ARTIFICIAL HIP JOINT: Chronic | ICD-10-CM

## 2023-02-20 DIAGNOSIS — Z95.2 PRESENCE OF PROSTHETIC HEART VALVE: Chronic | ICD-10-CM

## 2023-02-20 DIAGNOSIS — R09.89 OTHER SPECIFIED SYMPTOMS AND SIGNS INVOLVING THE CIRCULATORY AND RESPIRATORY SYSTEMS: ICD-10-CM

## 2023-02-20 DIAGNOSIS — Z96.651 PRESENCE OF RIGHT ARTIFICIAL KNEE JOINT: Chronic | ICD-10-CM

## 2023-02-20 DIAGNOSIS — R62.7 ADULT FAILURE TO THRIVE: ICD-10-CM

## 2023-02-20 LAB
ALBUMIN SERPL ELPH-MCNC: 3.7 G/DL — SIGNIFICANT CHANGE UP (ref 3.3–5)
ALP SERPL-CCNC: 109 U/L — SIGNIFICANT CHANGE UP (ref 40–120)
ALT FLD-CCNC: 30 U/L — SIGNIFICANT CHANGE UP (ref 12–78)
ANION GAP SERPL CALC-SCNC: 5 MMOL/L — SIGNIFICANT CHANGE UP (ref 5–17)
APPEARANCE UR: CLEAR — SIGNIFICANT CHANGE UP
AST SERPL-CCNC: 15 U/L — SIGNIFICANT CHANGE UP (ref 15–37)
BASOPHILS # BLD AUTO: 0.04 K/UL — SIGNIFICANT CHANGE UP (ref 0–0.2)
BASOPHILS NFR BLD AUTO: 0.4 % — SIGNIFICANT CHANGE UP (ref 0–2)
BILIRUB SERPL-MCNC: 0.5 MG/DL — SIGNIFICANT CHANGE UP (ref 0.2–1.2)
BILIRUB UR-MCNC: NEGATIVE — SIGNIFICANT CHANGE UP
BUN SERPL-MCNC: 33 MG/DL — HIGH (ref 7–23)
CALCIUM SERPL-MCNC: 9.4 MG/DL — SIGNIFICANT CHANGE UP (ref 8.5–10.1)
CHLORIDE SERPL-SCNC: 102 MMOL/L — SIGNIFICANT CHANGE UP (ref 96–108)
CO2 SERPL-SCNC: 27 MMOL/L — SIGNIFICANT CHANGE UP (ref 22–31)
COLOR SPEC: YELLOW — SIGNIFICANT CHANGE UP
CREAT SERPL-MCNC: 1.6 MG/DL — HIGH (ref 0.5–1.3)
DIFF PNL FLD: NEGATIVE — SIGNIFICANT CHANGE UP
EGFR: 42 ML/MIN/1.73M2 — LOW
EOSINOPHIL # BLD AUTO: 0.03 K/UL — SIGNIFICANT CHANGE UP (ref 0–0.5)
EOSINOPHIL NFR BLD AUTO: 0.3 % — SIGNIFICANT CHANGE UP (ref 0–6)
FLUAV AG NPH QL: SIGNIFICANT CHANGE UP
FLUBV AG NPH QL: SIGNIFICANT CHANGE UP
GLUCOSE SERPL-MCNC: 111 MG/DL — HIGH (ref 70–99)
GLUCOSE UR QL: NEGATIVE — SIGNIFICANT CHANGE UP
HCT VFR BLD CALC: 36 % — LOW (ref 39–50)
HGB BLD-MCNC: 11.9 G/DL — LOW (ref 13–17)
IMM GRANULOCYTES NFR BLD AUTO: 0.3 % — SIGNIFICANT CHANGE UP (ref 0–0.9)
KETONES UR-MCNC: NEGATIVE — SIGNIFICANT CHANGE UP
LEUKOCYTE ESTERASE UR-ACNC: NEGATIVE — SIGNIFICANT CHANGE UP
LIDOCAIN IGE QN: 118 U/L — SIGNIFICANT CHANGE UP (ref 73–393)
LYMPHOCYTES # BLD AUTO: 0.93 K/UL — LOW (ref 1–3.3)
LYMPHOCYTES # BLD AUTO: 8.7 % — LOW (ref 13–44)
MAGNESIUM SERPL-MCNC: 2 MG/DL — SIGNIFICANT CHANGE UP (ref 1.6–2.6)
MCHC RBC-ENTMCNC: 30.7 PG — SIGNIFICANT CHANGE UP (ref 27–34)
MCHC RBC-ENTMCNC: 33.1 GM/DL — SIGNIFICANT CHANGE UP (ref 32–36)
MCV RBC AUTO: 93 FL — SIGNIFICANT CHANGE UP (ref 80–100)
MONOCYTES # BLD AUTO: 0.67 K/UL — SIGNIFICANT CHANGE UP (ref 0–0.9)
MONOCYTES NFR BLD AUTO: 6.3 % — SIGNIFICANT CHANGE UP (ref 2–14)
NEUTROPHILS # BLD AUTO: 9.02 K/UL — HIGH (ref 1.8–7.4)
NEUTROPHILS NFR BLD AUTO: 84 % — HIGH (ref 43–77)
NITRITE UR-MCNC: NEGATIVE — SIGNIFICANT CHANGE UP
PH UR: 6 — SIGNIFICANT CHANGE UP (ref 5–8)
PHOSPHATE SERPL-MCNC: 3.2 MG/DL — SIGNIFICANT CHANGE UP (ref 2.5–4.5)
PLATELET # BLD AUTO: 198 K/UL — SIGNIFICANT CHANGE UP (ref 150–400)
POTASSIUM SERPL-MCNC: 4.5 MMOL/L — SIGNIFICANT CHANGE UP (ref 3.5–5.3)
POTASSIUM SERPL-SCNC: 4.5 MMOL/L — SIGNIFICANT CHANGE UP (ref 3.5–5.3)
PROT SERPL-MCNC: 7.6 GM/DL — SIGNIFICANT CHANGE UP (ref 6–8.3)
PROT UR-MCNC: NEGATIVE — SIGNIFICANT CHANGE UP
RBC # BLD: 3.87 M/UL — LOW (ref 4.2–5.8)
RBC # FLD: 12 % — SIGNIFICANT CHANGE UP (ref 10.3–14.5)
RSV RNA NPH QL NAA+NON-PROBE: SIGNIFICANT CHANGE UP
SARS-COV-2 RNA SPEC QL NAA+PROBE: SIGNIFICANT CHANGE UP
SODIUM SERPL-SCNC: 134 MMOL/L — LOW (ref 135–145)
SP GR SPEC: 1.01 — SIGNIFICANT CHANGE UP (ref 1.01–1.02)
TROPONIN I, HIGH SENSITIVITY RESULT: 38.39 NG/L — SIGNIFICANT CHANGE UP
TSH SERPL-MCNC: 2.01 UU/ML — SIGNIFICANT CHANGE UP (ref 0.34–4.82)
UROBILINOGEN FLD QL: NEGATIVE — SIGNIFICANT CHANGE UP
WBC # BLD: 10.72 K/UL — HIGH (ref 3.8–10.5)
WBC # FLD AUTO: 10.72 K/UL — HIGH (ref 3.8–10.5)

## 2023-02-20 PROCEDURE — 85014 HEMATOCRIT: CPT

## 2023-02-20 PROCEDURE — 87635 SARS-COV-2 COVID-19 AMP PRB: CPT

## 2023-02-20 PROCEDURE — 93971 EXTREMITY STUDY: CPT | Mod: RT

## 2023-02-20 PROCEDURE — 85610 PROTHROMBIN TIME: CPT

## 2023-02-20 PROCEDURE — 97162 PT EVAL MOD COMPLEX 30 MIN: CPT | Mod: GP

## 2023-02-20 PROCEDURE — 71045 X-RAY EXAM CHEST 1 VIEW: CPT | Mod: 26

## 2023-02-20 PROCEDURE — 82746 ASSAY OF FOLIC ACID SERUM: CPT

## 2023-02-20 PROCEDURE — 82272 OCCULT BLD FECES 1-3 TESTS: CPT

## 2023-02-20 PROCEDURE — 82607 VITAMIN B-12: CPT

## 2023-02-20 PROCEDURE — 86850 RBC ANTIBODY SCREEN: CPT

## 2023-02-20 PROCEDURE — 0241U: CPT

## 2023-02-20 PROCEDURE — 73560 X-RAY EXAM OF KNEE 1 OR 2: CPT | Mod: RT

## 2023-02-20 PROCEDURE — 36415 COLL VENOUS BLD VENIPUNCTURE: CPT

## 2023-02-20 PROCEDURE — P9016: CPT

## 2023-02-20 PROCEDURE — 97116 GAIT TRAINING THERAPY: CPT | Mod: GP

## 2023-02-20 PROCEDURE — 36430 TRANSFUSION BLD/BLD COMPNT: CPT

## 2023-02-20 PROCEDURE — 86901 BLOOD TYPING SEROLOGIC RH(D): CPT

## 2023-02-20 PROCEDURE — C1889: CPT

## 2023-02-20 PROCEDURE — 93005 ELECTROCARDIOGRAM TRACING: CPT

## 2023-02-20 PROCEDURE — 86900 BLOOD TYPING SEROLOGIC ABO: CPT

## 2023-02-20 PROCEDURE — 99223 1ST HOSP IP/OBS HIGH 75: CPT

## 2023-02-20 PROCEDURE — 86923 COMPATIBILITY TEST ELECTRIC: CPT

## 2023-02-20 PROCEDURE — 85730 THROMBOPLASTIN TIME PARTIAL: CPT

## 2023-02-20 PROCEDURE — 85027 COMPLETE CBC AUTOMATED: CPT

## 2023-02-20 PROCEDURE — 93971 EXTREMITY STUDY: CPT | Mod: 26,RT

## 2023-02-20 PROCEDURE — 97530 THERAPEUTIC ACTIVITIES: CPT | Mod: GP

## 2023-02-20 PROCEDURE — C9113: CPT

## 2023-02-20 PROCEDURE — 99285 EMERGENCY DEPT VISIT HI MDM: CPT

## 2023-02-20 PROCEDURE — 93010 ELECTROCARDIOGRAM REPORT: CPT

## 2023-02-20 PROCEDURE — 85018 HEMOGLOBIN: CPT

## 2023-02-20 PROCEDURE — 80048 BASIC METABOLIC PNL TOTAL CA: CPT

## 2023-02-20 RX ORDER — ENOXAPARIN SODIUM 100 MG/ML
40 INJECTION SUBCUTANEOUS EVERY 24 HOURS
Refills: 0 | Status: DISCONTINUED | OUTPATIENT
Start: 2023-02-20 | End: 2023-02-21

## 2023-02-20 RX ORDER — SODIUM CHLORIDE 9 MG/ML
1000 INJECTION INTRAMUSCULAR; INTRAVENOUS; SUBCUTANEOUS ONCE
Refills: 0 | Status: COMPLETED | OUTPATIENT
Start: 2023-02-20 | End: 2023-02-20

## 2023-02-20 RX ORDER — ATORVASTATIN CALCIUM 80 MG/1
10 TABLET, FILM COATED ORAL AT BEDTIME
Refills: 0 | Status: DISCONTINUED | OUTPATIENT
Start: 2023-02-20 | End: 2023-02-27

## 2023-02-20 RX ORDER — FINASTERIDE 5 MG/1
5 TABLET, FILM COATED ORAL DAILY
Refills: 0 | Status: DISCONTINUED | OUTPATIENT
Start: 2023-02-20 | End: 2023-02-28

## 2023-02-20 RX ORDER — SODIUM CHLORIDE 9 MG/ML
1000 INJECTION INTRAMUSCULAR; INTRAVENOUS; SUBCUTANEOUS
Refills: 0 | Status: DISCONTINUED | OUTPATIENT
Start: 2023-02-20 | End: 2023-02-21

## 2023-02-20 RX ORDER — OMEGA-3 ACID ETHYL ESTERS 1 G
1 CAPSULE ORAL
Qty: 0 | Refills: 0 | DISCHARGE

## 2023-02-20 RX ORDER — TAMSULOSIN HYDROCHLORIDE 0.4 MG/1
0.8 CAPSULE ORAL AT BEDTIME
Refills: 0 | Status: DISCONTINUED | OUTPATIENT
Start: 2023-02-20 | End: 2023-02-28

## 2023-02-20 RX ORDER — ACETAMINOPHEN 500 MG
650 TABLET ORAL EVERY 6 HOURS
Refills: 0 | Status: DISCONTINUED | OUTPATIENT
Start: 2023-02-20 | End: 2023-02-28

## 2023-02-20 RX ORDER — LIDOCAINE 4 G/100G
1 CREAM TOPICAL DAILY
Refills: 0 | Status: DISCONTINUED | OUTPATIENT
Start: 2023-02-20 | End: 2023-02-28

## 2023-02-20 RX ORDER — AMLODIPINE BESYLATE 2.5 MG/1
5 TABLET ORAL DAILY
Refills: 0 | Status: DISCONTINUED | OUTPATIENT
Start: 2023-02-20 | End: 2023-02-27

## 2023-02-20 RX ORDER — HYDROMORPHONE HYDROCHLORIDE 2 MG/ML
0.5 INJECTION INTRAMUSCULAR; INTRAVENOUS; SUBCUTANEOUS EVERY 4 HOURS
Refills: 0 | Status: DISCONTINUED | OUTPATIENT
Start: 2023-02-20 | End: 2023-02-24

## 2023-02-20 RX ORDER — FINASTERIDE 5 MG/1
1 TABLET, FILM COATED ORAL
Qty: 0 | Refills: 0 | DISCHARGE

## 2023-02-20 RX ORDER — HYDRALAZINE HCL 50 MG
10 TABLET ORAL
Refills: 0 | Status: DISCONTINUED | OUTPATIENT
Start: 2023-02-20 | End: 2023-02-28

## 2023-02-20 RX ORDER — ESCITALOPRAM OXALATE 10 MG/1
1 TABLET, FILM COATED ORAL
Qty: 0 | Refills: 0 | DISCHARGE

## 2023-02-20 RX ORDER — ALFUZOSIN HYDROCHLORIDE 10 MG/1
1 TABLET, EXTENDED RELEASE ORAL
Qty: 0 | Refills: 0 | DISCHARGE

## 2023-02-20 RX ORDER — MORPHINE SULFATE 50 MG/1
2 CAPSULE, EXTENDED RELEASE ORAL ONCE
Refills: 0 | Status: DISCONTINUED | OUTPATIENT
Start: 2023-02-20 | End: 2023-02-20

## 2023-02-20 RX ORDER — POLYETHYLENE GLYCOL 3350 17 G/17G
17 POWDER, FOR SOLUTION ORAL DAILY
Refills: 0 | Status: DISCONTINUED | OUTPATIENT
Start: 2023-02-20 | End: 2023-02-28

## 2023-02-20 RX ORDER — ESCITALOPRAM OXALATE 10 MG/1
20 TABLET, FILM COATED ORAL DAILY
Refills: 0 | Status: DISCONTINUED | OUTPATIENT
Start: 2023-02-20 | End: 2023-02-28

## 2023-02-20 RX ORDER — NALOXONE HYDROCHLORIDE 4 MG/.1ML
0.4 SPRAY NASAL ONCE
Refills: 0 | Status: DISCONTINUED | OUTPATIENT
Start: 2023-02-20 | End: 2023-02-28

## 2023-02-20 RX ORDER — SENNA PLUS 8.6 MG/1
2 TABLET ORAL AT BEDTIME
Refills: 0 | Status: DISCONTINUED | OUTPATIENT
Start: 2023-02-20 | End: 2023-02-28

## 2023-02-20 RX ORDER — HYDROMORPHONE HYDROCHLORIDE 2 MG/ML
0.25 INJECTION INTRAMUSCULAR; INTRAVENOUS; SUBCUTANEOUS EVERY 4 HOURS
Refills: 0 | Status: DISCONTINUED | OUTPATIENT
Start: 2023-02-20 | End: 2023-02-24

## 2023-02-20 RX ORDER — CYCLOBENZAPRINE HYDROCHLORIDE 10 MG/1
10 TABLET, FILM COATED ORAL ONCE
Refills: 0 | Status: COMPLETED | OUTPATIENT
Start: 2023-02-20 | End: 2023-02-20

## 2023-02-20 RX ORDER — LANOLIN ALCOHOL/MO/W.PET/CERES
3 CREAM (GRAM) TOPICAL AT BEDTIME
Refills: 0 | Status: DISCONTINUED | OUTPATIENT
Start: 2023-02-20 | End: 2023-02-28

## 2023-02-20 RX ORDER — ONDANSETRON 8 MG/1
4 TABLET, FILM COATED ORAL EVERY 8 HOURS
Refills: 0 | Status: DISCONTINUED | OUTPATIENT
Start: 2023-02-20 | End: 2023-02-28

## 2023-02-20 RX ORDER — AMLODIPINE BESYLATE 2.5 MG/1
0 TABLET ORAL
Qty: 0 | Refills: 0 | DISCHARGE

## 2023-02-20 RX ADMIN — LIDOCAINE 1 PATCH: 4 CREAM TOPICAL at 17:26

## 2023-02-20 RX ADMIN — SENNA PLUS 2 TABLET(S): 8.6 TABLET ORAL at 22:59

## 2023-02-20 RX ADMIN — Medication 3 MILLIGRAM(S): at 22:59

## 2023-02-20 RX ADMIN — TAMSULOSIN HYDROCHLORIDE 0.8 MILLIGRAM(S): 0.4 CAPSULE ORAL at 22:59

## 2023-02-20 RX ADMIN — HYDROMORPHONE HYDROCHLORIDE 0.5 MILLIGRAM(S): 2 INJECTION INTRAMUSCULAR; INTRAVENOUS; SUBCUTANEOUS at 19:01

## 2023-02-20 RX ADMIN — SODIUM CHLORIDE 80 MILLILITER(S): 9 INJECTION INTRAMUSCULAR; INTRAVENOUS; SUBCUTANEOUS at 22:54

## 2023-02-20 RX ADMIN — CYCLOBENZAPRINE HYDROCHLORIDE 10 MILLIGRAM(S): 10 TABLET, FILM COATED ORAL at 15:11

## 2023-02-20 RX ADMIN — ENOXAPARIN SODIUM 40 MILLIGRAM(S): 100 INJECTION SUBCUTANEOUS at 22:58

## 2023-02-20 RX ADMIN — MORPHINE SULFATE 2 MILLIGRAM(S): 50 CAPSULE, EXTENDED RELEASE ORAL at 15:11

## 2023-02-20 RX ADMIN — HYDROMORPHONE HYDROCHLORIDE 0.5 MILLIGRAM(S): 2 INJECTION INTRAMUSCULAR; INTRAVENOUS; SUBCUTANEOUS at 17:27

## 2023-02-20 RX ADMIN — Medication 10 MILLIGRAM(S): at 22:58

## 2023-02-20 RX ADMIN — SODIUM CHLORIDE 1000 MILLILITER(S): 9 INJECTION INTRAMUSCULAR; INTRAVENOUS; SUBCUTANEOUS at 15:17

## 2023-02-20 RX ADMIN — LIDOCAINE 1 PATCH: 4 CREAM TOPICAL at 19:00

## 2023-02-20 RX ADMIN — SODIUM CHLORIDE 80 MILLILITER(S): 9 INJECTION INTRAMUSCULAR; INTRAVENOUS; SUBCUTANEOUS at 17:27

## 2023-02-20 RX ADMIN — SODIUM CHLORIDE 80 MILLILITER(S): 9 INJECTION INTRAMUSCULAR; INTRAVENOUS; SUBCUTANEOUS at 18:38

## 2023-02-20 RX ADMIN — ATORVASTATIN CALCIUM 10 MILLIGRAM(S): 80 TABLET, FILM COATED ORAL at 22:59

## 2023-02-20 RX ADMIN — MORPHINE SULFATE 2 MILLIGRAM(S): 50 CAPSULE, EXTENDED RELEASE ORAL at 16:44

## 2023-02-20 RX ADMIN — SODIUM CHLORIDE 1000 MILLILITER(S): 9 INJECTION INTRAMUSCULAR; INTRAVENOUS; SUBCUTANEOUS at 12:56

## 2023-02-20 NOTE — ED ADULT NURSE NOTE - CHIEF COMPLAINT QUOTE
patient brought in by EMS from home c/o fatigue, right leg pain.  hx HTN, cardiac HTN, dementia - patient is A&Ox2 at this time.  per EMS, wife has noticed increasing lethargy and weakness the last 3-4 days.  patient c/o right leg pain which is chronic.  BGM by .  patient denies fever/chills, cough.

## 2023-02-20 NOTE — H&P ADULT - ASSESSMENT
88M w/PMH BPH, Knee pain, Parkinson's disease w/ dementia, HTN, AVR, presents BIB wife from home c/o FTT, decreased po intake over past several days and not walking much d/t knee pain, feet pain.  Pt is AAOx2 (PP), fears food will end up in back of his nose if he eats,   In ED, wbc 10, UA neg, cxr neg, Na 134, Cr 1.6 (prior 1.1), HR 90s, given IVF, morphine x1.     #FTT w/ poor po intake in setting of delirium  - treat underlying cause  - nutrition cs  - supportive care  - aspiration precautions    #Delirium w/ dementia- no e/o infection, possibly d/t dehydration, progression of dementia  #Parkinson's dementia  - psych cs for input and recs for tx   - outpt f/u w/ his neurologist for eval of PD, dementia  - possibly related to recent edibles- d/w wife    #Chronic Rt knee pain w/ increased swelling of RLE  - check RLE doppler in this pt who has been increasingly wc/bedbound  - trial of lido patch, iv dilaudid, ice to knee  - PT eval   - hold tizandine, celebrex home doses    #Feet pain- possibly peripheral neuropathy  - check b12, folate    #HTN- bp stable  - c/w pt's dose of hydralazine, amlodipine    #MAYRA 2/2 likely dehydration  #hyponatremia, mild  - NS @80cc/hr  - check am labs    #PPX- lovenox

## 2023-02-20 NOTE — ED PROVIDER NOTE - OBJECTIVE STATEMENT
89 y/o male with a PMHx of anemia, BPH, chronic pain, dementia, high cholesterol, HTN, mild CAD, s/p AVR presents to the ED c/o generalized weakness. Pt also c/o chronic right knee pain. Denies abd pain, nausea, CP, SOB. No recent falls. No other complaints at this time.

## 2023-02-20 NOTE — ED ADULT TRIAGE NOTE - CHIEF COMPLAINT QUOTE
patient brought in by EMS from home c/o fatigue, right leg pain.  hx HTN, cardiac HTN, dementia - patient is A&Ox3 at this time.  per EMS, wife has noticed increasing lethargy and weakness the last 3-4 days.  patient c/o right leg pain which is chronic.  BGM by .  patient denies fever/chills, cough. patient brought in by EMS from home c/o fatigue, right leg pain.  hx HTN, cardiac HTN, dementia - patient is A&Ox2 at this time.  per EMS, wife has noticed increasing lethargy and weakness the last 3-4 days.  patient c/o right leg pain which is chronic.  BGM by .  patient denies fever/chills, cough.

## 2023-02-20 NOTE — PATIENT PROFILE ADULT - FALL HARM RISK - HARM RISK INTERVENTIONS

## 2023-02-20 NOTE — ED PROVIDER NOTE - PHYSICAL EXAMINATION
GENERAL: non-toxic appearing, in NAD  HEAD: atraumatic, normocephalic  EYES: vision grossly intact, no conjunctivitis or discharge  EARS: hearing grossly intact  NOSE: no nasal discharge, epistaxis   CARDIAC: RRR, normal S1S2, no cyanosis, cap refill < 2 seconds. +mild systolic murmur   PULM: no respiratory distress, oxygen saturation on RA wnl, CTAB, no crackles, rales, rhonchi, or wheezing  GI: abdomen nondistended, soft, nontender, no guarding or rebound tenderness, no palpable masses  NEURO: awake and alert, follows commands, normal speech, PERRLA, EOMI, no focal motor or sensory deficits, normal gait  MSK: spine appears normal, no joint swelling or erythema, no gross deformities of extremities  EXT: no peripheral edema, calf tenderness, redness or swelling  SKIN: warm, dry, and intact, no rashes  PSYCH: appropriate mood and affect

## 2023-02-20 NOTE — ED PROVIDER NOTE - CLINICAL SUMMARY MEDICAL DECISION MAKING FREE TEXT BOX
Will conduct failure to thrive workup including labs and infectious workup. Low concern for stroke at this time. Will give IVF. disposition pending work-up and response to treatment.

## 2023-02-20 NOTE — PHARMACOTHERAPY INTERVENTION NOTE - COMMENTS
Medication history complete, reviewed medications with patient  provided  med list and confirmed with doctor first med hx.

## 2023-02-20 NOTE — ED ADULT NURSE NOTE - OBJECTIVE STATEMENT
Patient has Pain in right knee and wife states hips. Wife states he has a decreased appetite and stopped walking on Friday.  Hx of a fall a month ago wife states he started to have a decline after the fall.  Increasing right hip and right knee pain.  Patient was evaluated with no positive findings at that time.    Hx of kidneystones

## 2023-02-20 NOTE — H&P ADULT - HISTORY OF PRESENT ILLNESS
HPI:  88M w/PMH BPH, Knee pain, Parkinson's disease w/ dementia, HTN, AVR, presents BIB wife from home c/o FTT, decreased po intake over past several days and not walking much d/t knee pain, feet pain.  Pt is AAOx2 (PP), hx obtained from wife at bedside.  She endorses that pt normally ambulatory but over past several days, he's been more WC and Bedbound.  Wife also endorses that pt not eating as he c/o fear of food will go up his nose and has been having sensation of diaphoresis at night, although he's not sweating.  She's started giving pt "edibles" hoping that it will help calm him down but not much relief from pain.  She denies any notable fever, n/v/d, abd pain, sob, cough.  Pt can confirm that he doesn't want to eat out of fear of getting food in the back of his nose and tells me that his Rt knee and bottom of his feet hurt.      In ED, wbc 10, UA neg, cxr neg, Na 134, Cr 1.6 (prior 1.1), HR 90s, given IVF, morphine x1.       PAST MEDICAL & SURGICAL HISTORY:  parkinson's disease w/dementia  Hypertension  BPH (benign prostatic hyperplasia)  High cholesterol   s/p knee and hip replacements  Non Obstructive CAD  Anemia  Dementia  History of total right knee replacement (TKR) X2  History of total hip replacement, right  S/P AVR (aortic valve replacement)        Review of Systems:   cannot assess d/t pt's dementia       Social History:   lives w/ wife and requires assistance    FAMILY HISTORY:  cannot assess d/t pt's dementia      MEDICATIONS  (STANDING):  amLODIPine   Tablet 5 milliGRAM(s) Oral daily  atorvastatin 10 milliGRAM(s) Oral at bedtime  enoxaparin Injectable 40 milliGRAM(s) SubCutaneous every 24 hours  escitalopram 20 milliGRAM(s) Oral daily  finasteride 5 milliGRAM(s) Oral daily  hydrALAZINE 10 milliGRAM(s) Oral daily  lidocaine   4% Patch 1 Patch Transdermal daily  naloxone Injectable 0.4 milliGRAM(s) IV Push once  polyethylene glycol 3350 17 Gram(s) Oral daily  senna 2 Tablet(s) Oral at bedtime  sodium chloride 0.9%. 1000 milliLiter(s) (80 mL/Hr) IV Continuous <Continuous>  tamsulosin 0.8 milliGRAM(s) Oral at bedtime    MEDICATIONS  (PRN):  acetaminophen     Tablet .. 650 milliGRAM(s) Oral every 6 hours PRN Temp greater or equal to 38C (100.4F), Mild Pain (1 - 3)  aluminum hydroxide/magnesium hydroxide/simethicone Suspension 30 milliLiter(s) Oral every 4 hours PRN Dyspepsia  bisacodyl 5 milliGRAM(s) Oral daily PRN Constipation  HYDROmorphone  Injectable 0.25 milliGRAM(s) IV Push every 4 hours PRN Moderate Pain (4 - 6)  HYDROmorphone  Injectable 0.5 milliGRAM(s) IV Push every 4 hours PRN Severe Pain (7 - 10)  melatonin 3 milliGRAM(s) Oral at bedtime PRN Insomnia  ondansetron Injectable 4 milliGRAM(s) IV Push every 8 hours PRN Nausea and/or Vomiting      PHYSICAL EXAM:  Vital Signs Last 24 Hrs  T(C): 36.6 (2023 15:26), Max: 37.4 (2023 13:00)  T(F): 97.8 (2023 15:26), Max: 99.3 (2023 13:00)  HR: 86 (2023 15:26) (86 - 102)  BP: 132/72 (2023 15:26) (132/72 - 140/68)  BP(mean): --  RR: 19 (2023 15:26) (15 - 20)  SpO2: 98% (2023 15:26) (98% - 100%)    Parameters below as of 2023 15:26  Patient On (Oxygen Delivery Method): room air      GENERAL: NAD,   HEAD:  Atraumatic, Normocephalic  EYES: EOMI, PERRLA, conjunctiva and sclera clear  ENT: normal hearing, no nasal discharge, throat clear, dentition normal  NECK: Supple, No JVD, no LAD, no thyromegaly   CHEST/LUNG: Clear to auscultation bilaterally; No wheeze, respirations unlabored  HEART: Regular rate and rhythm; No murmurs, rubs, or gallops  ABDOMEN: Soft, Nontender, Nondistended; Bowel sounds present, no HSM  EXTREMITIES:  2+ Peripheral Pulses, No clubbing, cyanosis, ++ RLE calf >LLE in size w/ TTP, dec ROM d/t swelling of knee and pain  PSYCH: AAOx2, normal behavior  NEUROLOGY: non-focal, sensory and cn 2-12 intact, speech/language intact  SKIN: No visible rashes or lesions    LABS:                        11.9   10.72 )-----------( 198      ( 2023 12:13 )             36.0     02-    134<L>  |  102  |  33<H>  ----------------------------<  111<H>  4.5   |  27  |  1.60<H>    Ca    9.4      2023 12:13  Phos  3.2       Mg     2.0         TPro  7.6  /  Alb  3.7  /  TBili  0.5  /  DBili  x   /  AST  15  /  ALT  30  /  AlkPhos  109            Urinalysis Basic - ( 2023 12:13 )    Color: Yellow / Appearance: Clear / S.015 / pH: x  Gluc: x / Ketone: Negative  / Bili: Negative / Urobili: Negative   Blood: x / Protein: Negative / Nitrite: Negative   Leuk Esterase: Negative / RBC: x / WBC x   Sq Epi: x / Non Sq Epi: x / Bacteria: x        RADIOLOGY & ADDITIONAL TESTS:    Imaging Personally Reviewed:  cxr neg  EKG Personally Reviewed:  nsr  Consultant(s) Notes Reviewed:      Care Discussed with Consultants/Other Providers:

## 2023-02-20 NOTE — ED ADULT NURSE REASSESSMENT NOTE - NS ED NURSE REASSESS COMMENT FT1
Pt awake, alert and oriented x2, c/o pain in the right knee, pain medication provided, VSS, pt experiencing some delirium, has not eaten much in 4 days bc the "food gets caught in his nose", fluids provided, pt's wife at bedside and instructed on plan of care
Straight Cath done 500cc of straw colored urine removed.  U/A and C&S sent to laboratory.  Wife at bedside.  Patient has excoriation and redness to sacral area, repositioned and cream applied.  Warm heat pack applied to right knee for pain at patient request.  Allergies and medications updated at this time.

## 2023-02-20 NOTE — ED ADULT NURSE NOTE - NSIMPLEMENTINTERV_GEN_ALL_ED
Implemented All Fall with Harm Risk Interventions:  [place] to call system. Call bell, personal items and telephone within reach. Instruct patient to call for assistance. Room bathroom lighting operational. Non-slip footwear when patient is off stretcher. Physically safe environment: no spills, clutter or unnecessary equipment. Stretcher in lowest position, wheels locked, appropriate side rails in place. Provide visual cue, wrist band, yellow gown, etc. Monitor gait and stability. Monitor for mental status changes and reorient to person, place, and time. Review medications for side effects contributing to fall risk. Reinforce activity limits and safety measures with patient and family. Provide visual clues: red socks.

## 2023-02-21 ENCOUNTER — TRANSCRIPTION ENCOUNTER (OUTPATIENT)
Age: 88
End: 2023-02-21

## 2023-02-21 LAB
ANION GAP SERPL CALC-SCNC: 5 MMOL/L — SIGNIFICANT CHANGE UP (ref 5–17)
BUN SERPL-MCNC: 36 MG/DL — HIGH (ref 7–23)
CALCIUM SERPL-MCNC: 8.6 MG/DL — SIGNIFICANT CHANGE UP (ref 8.5–10.1)
CHLORIDE SERPL-SCNC: 109 MMOL/L — HIGH (ref 96–108)
CO2 SERPL-SCNC: 24 MMOL/L — SIGNIFICANT CHANGE UP (ref 22–31)
CREAT SERPL-MCNC: 1.28 MG/DL — SIGNIFICANT CHANGE UP (ref 0.5–1.3)
CULTURE RESULTS: NO GROWTH — SIGNIFICANT CHANGE UP
EGFR: 55 ML/MIN/1.73M2 — LOW
FOLATE SERPL-MCNC: 4.8 NG/ML — SIGNIFICANT CHANGE UP
GLUCOSE SERPL-MCNC: 108 MG/DL — HIGH (ref 70–99)
HCT VFR BLD CALC: 19.5 % — CRITICAL LOW (ref 39–50)
HCT VFR BLD CALC: 21.7 % — LOW (ref 39–50)
HCT VFR BLD CALC: 22.5 % — LOW (ref 39–50)
HCT VFR BLD CALC: 24.1 % — LOW (ref 39–50)
HGB BLD-MCNC: 6.3 G/DL — CRITICAL LOW (ref 13–17)
HGB BLD-MCNC: 7.1 G/DL — LOW (ref 13–17)
HGB BLD-MCNC: 7.3 G/DL — LOW (ref 13–17)
HGB BLD-MCNC: 7.8 G/DL — LOW (ref 13–17)
MCHC RBC-ENTMCNC: 30.7 PG — SIGNIFICANT CHANGE UP (ref 27–34)
MCHC RBC-ENTMCNC: 30.8 PG — SIGNIFICANT CHANGE UP (ref 27–34)
MCHC RBC-ENTMCNC: 31.1 PG — SIGNIFICANT CHANGE UP (ref 27–34)
MCHC RBC-ENTMCNC: 31.2 PG — SIGNIFICANT CHANGE UP (ref 27–34)
MCHC RBC-ENTMCNC: 32.3 GM/DL — SIGNIFICANT CHANGE UP (ref 32–36)
MCHC RBC-ENTMCNC: 32.4 GM/DL — SIGNIFICANT CHANGE UP (ref 32–36)
MCHC RBC-ENTMCNC: 32.4 GM/DL — SIGNIFICANT CHANGE UP (ref 32–36)
MCHC RBC-ENTMCNC: 32.7 GM/DL — SIGNIFICANT CHANGE UP (ref 32–36)
MCV RBC AUTO: 95.1 FL — SIGNIFICANT CHANGE UP (ref 80–100)
MCV RBC AUTO: 95.2 FL — SIGNIFICANT CHANGE UP (ref 80–100)
MCV RBC AUTO: 95.3 FL — SIGNIFICANT CHANGE UP (ref 80–100)
MCV RBC AUTO: 96.2 FL — SIGNIFICANT CHANGE UP (ref 80–100)
PLATELET # BLD AUTO: 134 K/UL — LOW (ref 150–400)
PLATELET # BLD AUTO: 145 K/UL — LOW (ref 150–400)
PLATELET # BLD AUTO: 159 K/UL — SIGNIFICANT CHANGE UP (ref 150–400)
PLATELET # BLD AUTO: 165 K/UL — SIGNIFICANT CHANGE UP (ref 150–400)
POTASSIUM SERPL-MCNC: 4.7 MMOL/L — SIGNIFICANT CHANGE UP (ref 3.5–5.3)
POTASSIUM SERPL-SCNC: 4.7 MMOL/L — SIGNIFICANT CHANGE UP (ref 3.5–5.3)
RBC # BLD: 2.05 M/UL — LOW (ref 4.2–5.8)
RBC # BLD: 2.28 M/UL — LOW (ref 4.2–5.8)
RBC # BLD: 2.34 M/UL — LOW (ref 4.2–5.8)
RBC # BLD: 2.53 M/UL — LOW (ref 4.2–5.8)
RBC # FLD: 11.9 % — SIGNIFICANT CHANGE UP (ref 10.3–14.5)
RBC # FLD: 12 % — SIGNIFICANT CHANGE UP (ref 10.3–14.5)
RBC # FLD: 12.1 % — SIGNIFICANT CHANGE UP (ref 10.3–14.5)
RBC # FLD: 12.6 % — SIGNIFICANT CHANGE UP (ref 10.3–14.5)
SODIUM SERPL-SCNC: 138 MMOL/L — SIGNIFICANT CHANGE UP (ref 135–145)
SPECIMEN SOURCE: SIGNIFICANT CHANGE UP
VIT B12 SERPL-MCNC: 352 PG/ML — SIGNIFICANT CHANGE UP (ref 232–1245)
WBC # BLD: 7.05 K/UL — SIGNIFICANT CHANGE UP (ref 3.8–10.5)
WBC # BLD: 7.8 K/UL — SIGNIFICANT CHANGE UP (ref 3.8–10.5)
WBC # BLD: 7.89 K/UL — SIGNIFICANT CHANGE UP (ref 3.8–10.5)
WBC # BLD: 8.15 K/UL — SIGNIFICANT CHANGE UP (ref 3.8–10.5)
WBC # FLD AUTO: 7.05 K/UL — SIGNIFICANT CHANGE UP (ref 3.8–10.5)
WBC # FLD AUTO: 7.8 K/UL — SIGNIFICANT CHANGE UP (ref 3.8–10.5)
WBC # FLD AUTO: 7.89 K/UL — SIGNIFICANT CHANGE UP (ref 3.8–10.5)
WBC # FLD AUTO: 8.15 K/UL — SIGNIFICANT CHANGE UP (ref 3.8–10.5)

## 2023-02-21 PROCEDURE — 90792 PSYCH DIAG EVAL W/MED SRVCS: CPT

## 2023-02-21 PROCEDURE — 73560 X-RAY EXAM OF KNEE 1 OR 2: CPT | Mod: 26,RT

## 2023-02-21 PROCEDURE — 99233 SBSQ HOSP IP/OBS HIGH 50: CPT | Mod: GC

## 2023-02-21 RX ORDER — QUETIAPINE FUMARATE 200 MG/1
12.5 TABLET, FILM COATED ORAL AT BEDTIME
Refills: 0 | Status: DISCONTINUED | OUTPATIENT
Start: 2023-02-21 | End: 2023-02-24

## 2023-02-21 RX ORDER — PANTOPRAZOLE SODIUM 20 MG/1
40 TABLET, DELAYED RELEASE ORAL
Refills: 0 | Status: DISCONTINUED | OUTPATIENT
Start: 2023-02-21 | End: 2023-02-23

## 2023-02-21 RX ORDER — QUETIAPINE FUMARATE 200 MG/1
12.5 TABLET, FILM COATED ORAL
Refills: 0 | Status: DISCONTINUED | OUTPATIENT
Start: 2023-02-21 | End: 2023-02-24

## 2023-02-21 RX ADMIN — ATORVASTATIN CALCIUM 10 MILLIGRAM(S): 80 TABLET, FILM COATED ORAL at 21:24

## 2023-02-21 RX ADMIN — PANTOPRAZOLE SODIUM 40 MILLIGRAM(S): 20 TABLET, DELAYED RELEASE ORAL at 09:50

## 2023-02-21 RX ADMIN — HYDROMORPHONE HYDROCHLORIDE 0.5 MILLIGRAM(S): 2 INJECTION INTRAMUSCULAR; INTRAVENOUS; SUBCUTANEOUS at 11:47

## 2023-02-21 RX ADMIN — HYDROMORPHONE HYDROCHLORIDE 0.5 MILLIGRAM(S): 2 INJECTION INTRAMUSCULAR; INTRAVENOUS; SUBCUTANEOUS at 17:53

## 2023-02-21 RX ADMIN — LIDOCAINE 1 PATCH: 4 CREAM TOPICAL at 18:32

## 2023-02-21 RX ADMIN — TAMSULOSIN HYDROCHLORIDE 0.8 MILLIGRAM(S): 0.4 CAPSULE ORAL at 21:23

## 2023-02-21 RX ADMIN — AMLODIPINE BESYLATE 5 MILLIGRAM(S): 2.5 TABLET ORAL at 05:56

## 2023-02-21 RX ADMIN — HYDROMORPHONE HYDROCHLORIDE 0.5 MILLIGRAM(S): 2 INJECTION INTRAMUSCULAR; INTRAVENOUS; SUBCUTANEOUS at 10:40

## 2023-02-21 RX ADMIN — PANTOPRAZOLE SODIUM 40 MILLIGRAM(S): 20 TABLET, DELAYED RELEASE ORAL at 21:23

## 2023-02-21 RX ADMIN — LIDOCAINE 1 PATCH: 4 CREAM TOPICAL at 09:51

## 2023-02-21 RX ADMIN — HYDROMORPHONE HYDROCHLORIDE 0.5 MILLIGRAM(S): 2 INJECTION INTRAMUSCULAR; INTRAVENOUS; SUBCUTANEOUS at 19:20

## 2023-02-21 RX ADMIN — QUETIAPINE FUMARATE 12.5 MILLIGRAM(S): 200 TABLET, FILM COATED ORAL at 21:24

## 2023-02-21 RX ADMIN — Medication 650 MILLIGRAM(S): at 09:52

## 2023-02-21 RX ADMIN — ESCITALOPRAM OXALATE 20 MILLIGRAM(S): 10 TABLET, FILM COATED ORAL at 09:50

## 2023-02-21 RX ADMIN — SENNA PLUS 2 TABLET(S): 8.6 TABLET ORAL at 21:23

## 2023-02-21 RX ADMIN — Medication 10 MILLIGRAM(S): at 05:56

## 2023-02-21 RX ADMIN — Medication 650 MILLIGRAM(S): at 11:47

## 2023-02-21 RX ADMIN — LIDOCAINE 1 PATCH: 4 CREAM TOPICAL at 05:56

## 2023-02-21 RX ADMIN — Medication 10 MILLIGRAM(S): at 18:33

## 2023-02-21 RX ADMIN — FINASTERIDE 5 MILLIGRAM(S): 5 TABLET, FILM COATED ORAL at 09:50

## 2023-02-21 RX ADMIN — Medication 3 MILLIGRAM(S): at 21:23

## 2023-02-21 NOTE — BH CONSULTATION LIAISON ASSESSMENT NOTE - CURRENT MEDICATION
MEDICATIONS  (STANDING):  amLODIPine   Tablet 5 milliGRAM(s) Oral daily  atorvastatin 10 milliGRAM(s) Oral at bedtime  escitalopram 20 milliGRAM(s) Oral daily  finasteride 5 milliGRAM(s) Oral daily  hydrALAZINE 10 milliGRAM(s) Oral two times a day  lidocaine   4% Patch 1 Patch Transdermal daily  naloxone Injectable 0.4 milliGRAM(s) IV Push once  pantoprazole  Injectable 40 milliGRAM(s) IV Push two times a day  polyethylene glycol 3350 17 Gram(s) Oral daily  QUEtiapine 12.5 milliGRAM(s) Oral <User Schedule>  QUEtiapine 12.5 milliGRAM(s) Oral at bedtime  senna 2 Tablet(s) Oral at bedtime  sodium chloride 0.9%. 1000 milliLiter(s) (80 mL/Hr) IV Continuous <Continuous>  tamsulosin 0.8 milliGRAM(s) Oral at bedtime    MEDICATIONS  (PRN):  acetaminophen     Tablet .. 650 milliGRAM(s) Oral every 6 hours PRN Temp greater or equal to 38C (100.4F), Mild Pain (1 - 3)  aluminum hydroxide/magnesium hydroxide/simethicone Suspension 30 milliLiter(s) Oral every 4 hours PRN Dyspepsia  bisacodyl 5 milliGRAM(s) Oral daily PRN Constipation  HYDROmorphone  Injectable 0.25 milliGRAM(s) IV Push every 4 hours PRN Moderate Pain (4 - 6)  HYDROmorphone  Injectable 0.5 milliGRAM(s) IV Push every 4 hours PRN Severe Pain (7 - 10)  melatonin 3 milliGRAM(s) Oral at bedtime PRN Insomnia  ondansetron Injectable 4 milliGRAM(s) IV Push every 8 hours PRN Nausea and/or Vomiting

## 2023-02-21 NOTE — DIETITIAN INITIAL EVALUATION ADULT - FACTORS AFF FOOD INTAKE
2/2 dementia; fear of food/change in mental status/difficulty feeding self/difficulty with food procurement/preparation/pain

## 2023-02-21 NOTE — DISCHARGE NOTE NURSING/CASE MANAGEMENT/SOCIAL WORK - PATIENT PORTAL LINK FT
You can access the FollowMyHealth Patient Portal offered by [facility] by registering at the following website: [email or web address]. By joining [employer]’s FollowMyHealth portal, you will also be able to view your health information using other applications (apps) compatible with our system.

## 2023-02-21 NOTE — PROGRESS NOTE ADULT - SUBJECTIVE AND OBJECTIVE BOX
HPI:  88M w/PMH BPH, Knee pain, Parkinson's disease w/ dementia, HTN, AVR, presents BIB wife from home c/o FTT, decreased po intake over past several days and not walking much d/t knee pain, feet pain.  Pt is AAOx2 (PP), hx obtained from wife at bedside.  She endorses that pt normally ambulatory but over past several days, he's been more WC and Bedbound.  Wife also endorses that pt not eating as he c/o fear of food will go up his nose and has been having sensation of diaphoresis at night, although he's not sweating.  She's started giving pt "edibles" hoping that it will help calm him down but not much relief from pain.  She denies any notable fever, n/v/d, abd pain, sob, cough.  Pt can confirm that he doesn't want to eat out of fear of getting food in the back of his nose and tells me that his Rt knee and bottom of his feet hurt.  In ED, wbc 10, UA neg, cxr neg, Na 134, Cr 1.6 (prior 1.1), HR 90s, given IVF, morphine x1.     Subjective: Pt seen and evaluated by bedside. Pt's spouse and daughter present. Pt and family agrees to blood transfusion. Pt with no new complaints.     REVIEW OF SYSTEMS:  unable to assess due to dementia     =======================================================  Vitals:  T(F): 98.6 (21 Feb 2023 17:28), Max: 98.6 (21 Feb 2023 17:28)  HR: 99 (21 Feb 2023 17:28)  BP: 120/62 (21 Feb 2023 17:28)  RR: 18 (21 Feb 2023 17:28)  SpO2: 99% (21 Feb 2023 17:28) (97% - 100%)  temp max in last 48H T(F): , Max: 99.3 (02-20-23 @ 13:00)      =======================================================    PHYSICAL EXAM:  Constitutional: Pt lying in bed, awake and alert, NAD  HEENT: EOMI, normal hearing, dry mucous membranes  Neck: Soft and supple, no JVD  Respiratory: CTABL, No wheezing, rales or rhonchi  Cardiovascular: S1S2+, RRR, no M/G/R  Gastrointestinal: BS+, soft, NT/ND, no guarding, no rebound  Extremities: calf swollen b/l  and tender to palpation, b/l knee swelling and pain   Vascular: 2+ peripheral pulses  Neurological: AAOx2, no focal deficits  Musculoskeletal: 5/5 strength b/l upper and lower extremities  Skin: No rashes    =======================================================  Current Antibiotics:    Other medications:  amLODIPine   Tablet 5 milliGRAM(s) Oral daily  atorvastatin 10 milliGRAM(s) Oral at bedtime  escitalopram 20 milliGRAM(s) Oral daily  finasteride 5 milliGRAM(s) Oral daily  hydrALAZINE 10 milliGRAM(s) Oral two times a day  lidocaine   4% Patch 1 Patch Transdermal daily  naloxone Injectable 0.4 milliGRAM(s) IV Push once  pantoprazole  Injectable 40 milliGRAM(s) IV Push two times a day  polyethylene glycol 3350 17 Gram(s) Oral daily  QUEtiapine 12.5 milliGRAM(s) Oral <User Schedule>  QUEtiapine 12.5 milliGRAM(s) Oral at bedtime  senna 2 Tablet(s) Oral at bedtime  sodium chloride 0.9%. 1000 milliLiter(s) IV Continuous <Continuous>  tamsulosin 0.8 milliGRAM(s) Oral at bedtime      =======================================================  Labs:                        6.3    7.05  )-----------( 134      ( 21 Feb 2023 15:37 )             19.5     02-21    138  |  109<H>  |  36<H>  ----------------------------<  108<H>  4.7   |  24  |  1.28    Ca    8.6      21 Feb 2023 06:32  Phos  3.2     02-20  Mg     2.0     02-20    TPro  7.6  /  Alb  3.7  /  TBili  0.5  /  DBili  x   /  AST  15  /  ALT  30  /  AlkPhos  109  02-20      Culture - Urine (collected 02-20-23 @ 12:13)  Source: Clean Catch Clean Catch (Midstream)  Final Report (02-21-23 @ 14:59):    No growth        SARS-CoV-2 Result: Fernando (02-20-23 @ 15:04)      02-20-23 @ 07:01  -  02-21-23 @ 07:00  --------------------------------------------------------  IN: 0 mL / OUT: 490 mL / NET: -490 mL    02-21-23 @ 07:01  -  02-21-23 @ 20:01  --------------------------------------------------------  IN: 850 mL / OUT: 300 mL / NET: 550 mL        ========================================================  Imaging:   < from: Xray Knee 1 or 2 Views, Right (02.21.23 @ 12:51) >  IMPRESSION: Status post total right knee replacement. Hardware intact. No   evidence of fracture.    < end of copied text >    < from: US Duplex Venous Lower Ext Ltd, Right (02.20.23 @ 18:01) >  IMPRESSION:  No evidence of right lower extremity deep venous thrombosis.    < end of copied text >    < from: Xray Chest 1 View- PORTABLE-Urgent (Xray Chest 1 View- PORTABLE-Urgent .) (02.20.23 @ 12:05) >  IMPRESSION: No focal consolidation is seen.    < end of copied text >

## 2023-02-21 NOTE — BH CONSULTATION LIAISON ASSESSMENT NOTE - RISK ASSESSMENT
LOW RISK     ACUTE RISK FACTORS: Confusion    CHRONIC RISK FACTORS: Parkinson's disease w/ dementia    ACUTE RISK FACTORS: No suicide attempts, no violence history, medication compliance, no access to guns, no global insomnia, no substance abuse, supportive family, willingness to seek help, no suicidal ideation or homicidal ideation, hopefulness for future.

## 2023-02-21 NOTE — PROGRESS NOTE ADULT - SUBJECTIVE AND OBJECTIVE BOX
Pt has been seen and examined with FP resident, resident supervised agree with a/p       Patient is a 88y old  Male who presents with a chief complaint of Failure to thrive in adult     (21 Feb 2023 10:52)        PHYSICAL EXAM:  Vital Signs Last 24 Hrs  T(C): 36.7 (21 Feb 2023 07:27), Max: 36.8 (20 Feb 2023 18:00)  T(F): 98 (21 Feb 2023 07:27), Max: 98.2 (20 Feb 2023 18:00)  HR: 110 (21 Feb 2023 07:27) (86 - 110)  BP: 108/52 (21 Feb 2023 07:27) (108/52 - 149/77)  BP(mean): --  RR: 19 (21 Feb 2023 07:27) (18 - 20)  SpO2: 100% (21 Feb 2023 07:27) (97% - 100%)    Parameters below as of 21 Feb 2023 07:27  Patient On (Oxygen Delivery Method): room air      -rs-aeeb, cta  -cvs-s1s2 normal   -p/a-soft,bs+      A/P    #check cbc and monitor it, start PPI for possible GI     #dvt pr

## 2023-02-21 NOTE — DIETITIAN INITIAL EVALUATION ADULT - NSFNSGIIOFT_GEN_A_CORE
I&O's Detail    20 Feb 2023 07:01  -  21 Feb 2023 07:00  --------------------------------------------------------  IN:  Total IN: 0 mL    OUT:    Intermittent Catheterization - Urethral (mL): 490 mL  Total OUT: 490 mL    Total NET: -490 mL

## 2023-02-21 NOTE — DIETITIAN INITIAL EVALUATION ADULT - ADD RECOMMEND
Continue w/ regular diet to encourage PO intake. Add Ensure Plus HP BID to increase calorie and protein intake (provides 350 kcals, 20 g pro/ shake). Encourage intake of high kcal, protein-rich foods, maximize food preferences. Encourage adequate hydration and continue w/ IV fluids 2/2 high risk of dehydration 2/2 dementia. Recommend MVI w/ minerals daily to meet 100% RDA needs and suggest 200 mg thiamin suppl daily 2/2 poor PO intake/malnutrition. Recommend obtain vit D 25OH levels and if low, supplement accordingly. Recommend to monitor glucose and obtain POCT BGs and HgA1c to assess blood sugar levels. Continue to monitor bowel movements, if no BM for >3 days, consider implementing bowel regimen. Confirm goals of care regarding nutrition support. RD will continue to monitor PO intake, labs, hydration, and wt prn.

## 2023-02-21 NOTE — DIETITIAN INITIAL EVALUATION ADULT - MALNUTRITION
Pt meets criteria for severe protein-calorie malnutrition in the context of acute on chronic illness

## 2023-02-21 NOTE — DIETITIAN INITIAL EVALUATION ADULT - LAB (SPECIFY)
Obtain vit D 25OH level to assess nutriture   Obtain POCT BGs and HgA1c to assess blood sugar levels

## 2023-02-21 NOTE — DIETITIAN INITIAL EVALUATION ADULT - OTHER INFO
88M w/PMH BPH, Knee pain, Parkinson's disease w/ dementia, HTN, AVR, presents BIB wife from home c/o FTT, decreased po intake over past several days and not walking much d/t knee pain, feet pain.  Pt is AAOx2 (PP), hx obtained from wife at bedside.  Wife endorses that pt not eating as he c/o fear of food will go up his nose and has been having sensation of diaphoresis at night, although he's not sweating.  She's started giving pt "edibles" hoping that it will help calm him down but not much relief from pain.  Pt can confirm that he doesn't want to eat out of fear of getting food in the back of his nose and tells me that his Rt knee and bottom of his feet hurt.    Admit for generalized weakness and chronic right knee pain.     Unable to obtain diet/wt hx 2/2 h/o dementia. Pt currently on regular diet, recommend to continue to encourage PO intake. Bed scale wt of 164# obtained on 2/21/23 by RD. Wt changes unknown. NFPE reveals moderate-severe muscle/fat wasting but unable to complete NFPE 2/2 RN requesting time w/ pt. Trace edema doc'd on 2/20/23, possibly skewing wt.  88M w/PMH BPH, Knee pain, Parkinson's disease w/ dementia, HTN, AVR, presents BIB wife from home c/o FTT, decreased po intake over past several days and not walking much d/t knee pain, feet pain.  Pt is AAOx2 (PP), hx obtained from wife at bedside.  Wife endorses that pt not eating as he c/o fear of food will go up his nose and has been having sensation of diaphoresis at night, although he's not sweating.  She's started giving pt "edibles" hoping that it will help calm him down but not much relief from pain.  Pt can confirm that he doesn't want to eat out of fear of getting food in the back of his nose and tells me that his Rt knee and bottom of his feet hurt.    Admit for generalized weakness and chronic right knee pain.     Unable to obtain diet/wt hx 2/2 h/o dementia. Pt states he "does not think I'll be eating much" and reports liking applesauce. Recommend SLP consult 2/2 wife reporting pt has a fear of food going up the nose. Currently on regular diet, recommend to continue 2/2 likely poor PO intake and follow . Bed scale wt of 164# obtained on 2/21/23 by RD. Wt changes unknown. NFPE reveals moderate-severe muscle/fat wasting but unable to complete NFPE 2/2 RN requesting time w/ pt. Trace edema doc'd on 2/20/23, possibly skewing wt. Elevated glucose (108), recommend to monitor POCT BG and obtain an hgA1c. Currently on bowel regimen of Senna and polyethylene glycol daily.  Awaiting GI consult 2/2 GI bleed. Mehnaz consulted for delirium, hallucination, and dementia. Recommend to continue w/ IV fluids 2/2 increased risk for dehydration 2/2 dementia. Will add Ensure Plus BID to increase calorie and protein intake. Recommend to confirm goals of care regarding nutrition support, MOLST not addressed. See below for other recommendations.  88M w/PMH BPH, Knee pain, Parkinson's disease w/ dementia, HTN, AVR, presents BIB wife from home c/o FTT, decreased po intake over past several days and not walking much d/t knee pain, feet pain.  Pt is AAOx2 (PP), hx obtained from wife at bedside.  Wife endorses that pt not eating as he c/o fear of food will go up his nose and has been having sensation of diaphoresis at night, although he's not sweating.  She's started giving pt "edibles" hoping that it will help calm him down but not much relief from pain.  Pt can confirm that he doesn't want to eat out of fear of getting food in the back of his nose and tells me that his Rt knee and bottom of his feet hurt.    Admit for generalized weakness and chronic right knee pain.     Unable to obtain diet/wt hx 2/2 h/o dementia. Pt states he "does not think I'll be eating much" and wife reports pt has a fear of food going up the nose, psych consulted. Currently on regular diet, recommend to continue 2/2 likely poor PO intake and follow. Bed scale wt of 164# obtained on 2/21/23 by RD. Wt changes unknown. NFPE reveals moderate-severe muscle/fat wasting but unable to complete NFPE 2/2 RN needing to change bedpan - pt appeared thin, frail. Trace edema doc'd on 2/20/23, possibly skewing wt. Elevated glucose (108), recommend to monitor POCT BG and obtain an hgA1c. Currently on bowel regimen of Senna and polyethylene glycol daily and awaiting GI consult 2/2 GI bleed. Recommend to continue w/ IV fluids 2/2 increased risk for dehydration 2/2 dementia. Will add Ensure Plus HP BID to increase calorie and protein intake. Recommend to confirm goals of care regarding nutrition support, MOLST not addressed. See below for other recommendations.

## 2023-02-21 NOTE — DIETITIAN INITIAL EVALUATION ADULT - ORAL INTAKE PTA/DIET HISTORY
Unable to obtain diet hx/diet recall 2/2 dementia.  Lives at home w/ wife. Unable to obtain diet hx/diet recall from pt 2/2 h/o dementia. Wife reports decreased PO intake x last several days and has fear of food going up the nose as per H&P. On home meds of laxative (polyethylene glycol) x2/day.

## 2023-02-21 NOTE — DIETITIAN INITIAL EVALUATION ADULT - PERTINENT MEDS FT
MEDICATIONS  (STANDING):  amLODIPine   Tablet 5 milliGRAM(s) Oral daily  atorvastatin 10 milliGRAM(s) Oral at bedtime  escitalopram 20 milliGRAM(s) Oral daily  finasteride 5 milliGRAM(s) Oral daily  hydrALAZINE 10 milliGRAM(s) Oral two times a day  lidocaine   4% Patch 1 Patch Transdermal daily  naloxone Injectable 0.4 milliGRAM(s) IV Push once  pantoprazole  Injectable 40 milliGRAM(s) IV Push two times a day  polyethylene glycol 3350 17 Gram(s) Oral daily  senna 2 Tablet(s) Oral at bedtime  sodium chloride 0.9%. 1000 milliLiter(s) (80 mL/Hr) IV Continuous <Continuous>  tamsulosin 0.8 milliGRAM(s) Oral at bedtime    MEDICATIONS  (PRN):  acetaminophen     Tablet .. 650 milliGRAM(s) Oral every 6 hours PRN Temp greater or equal to 38C (100.4F), Mild Pain (1 - 3)  aluminum hydroxide/magnesium hydroxide/simethicone Suspension 30 milliLiter(s) Oral every 4 hours PRN Dyspepsia  bisacodyl 5 milliGRAM(s) Oral daily PRN Constipation  HYDROmorphone  Injectable 0.25 milliGRAM(s) IV Push every 4 hours PRN Moderate Pain (4 - 6)  HYDROmorphone  Injectable 0.5 milliGRAM(s) IV Push every 4 hours PRN Severe Pain (7 - 10)  melatonin 3 milliGRAM(s) Oral at bedtime PRN Insomnia  ondansetron Injectable 4 milliGRAM(s) IV Push every 8 hours PRN Nausea and/or Vomiting   MEDICATIONS  (STANDING):  amLODIPine   Tablet 5 milliGRAM(s) Oral daily  atorvastatin 10 milliGRAM(s) Oral at bedtime  escitalopram 20 milliGRAM(s) Oral daily  finasteride 5 milliGRAM(s) Oral daily  hydrALAZINE 10 milliGRAM(s) Oral two times a day  lidocaine   4% Patch 1 Patch Transdermal daily  naloxone Injectable 0.4 milliGRAM(s) IV Push once  pantoprazole  Injectable 40 milliGRAM(s) IV Push two times a day  polyethylene glycol 3350 17 Gram(s) Oral daily    **Bowel regimen implemented**  senna 2 Tablet(s) Oral at bedtime                          **Bowel regimen implemented**    sodium chloride 0.9%. 1000 milliLiter(s) (80 mL/Hr) IV Continuous <Continuous>  tamsulosin 0.8 milliGRAM(s) Oral at bedtime    MEDICATIONS  (PRN):  acetaminophen     Tablet .. 650 milliGRAM(s) Oral every 6 hours PRN Temp greater or equal to 38C (100.4F), Mild Pain (1 - 3)  aluminum hydroxide/magnesium hydroxide/simethicone Suspension 30 milliLiter(s) Oral every 4 hours PRN Dyspepsia  bisacodyl 5 milliGRAM(s) Oral daily PRN Constipation                                    **Bowel regimen implemented**  HYDROmorphone  Injectable 0.25 milliGRAM(s) IV Push every 4 hours PRN Moderate Pain (4 - 6)  HYDROmorphone  Injectable 0.5 milliGRAM(s) IV Push every 4 hours PRN Severe Pain (7 - 10)  melatonin 3 milliGRAM(s) Oral at bedtime PRN Insomnia  ondansetron Injectable 4 milliGRAM(s) IV Push every 8 hours PRN Nausea and/or Vomiting    **Bowel regimen implemented**

## 2023-02-21 NOTE — DISCHARGE NOTE NURSING/CASE MANAGEMENT/SOCIAL WORK - NSDCPEFALRISK_GEN_ALL_CORE
For information on Fall & Injury Prevention, visit: [email or web address] OR  [email or web address] OR  https://www.cdc.gov/steadi/patient.html

## 2023-02-21 NOTE — BH CONSULTATION LIAISON ASSESSMENT NOTE - NSBHCHARTREVIEWLAB_PSY_A_CORE FT
CBC Full  -  ( 21 Feb 2023 10:30 )  WBC Count : 7.89 K/uL  RBC Count : 2.34 M/uL  Hemoglobin : 7.3 g/dL  Hematocrit : 22.5 %  Platelet Count - Automated : 159 K/uL  Mean Cell Volume : 96.2 fl  Mean Cell Hemoglobin : 31.2 pg  Mean Cell Hemoglobin Concentration : 32.4 gm/dL  Auto Neutrophil # : x  Auto Lymphocyte # : x  Auto Monocyte # : x  Auto Eosinophil # : x  Auto Basophil # : x  Auto Neutrophil % : x  Auto Lymphocyte % : x  Auto Monocyte % : x  Auto Eosinophil % : x  Auto Basophil % : x    02-21    138  |  109<H>  |  36<H>  ----------------------------<  108<H>  4.7   |  24  |  1.28    Ca    8.6      21 Feb 2023 06:32  Phos  3.2     02-20  Mg     2.0     02-20    TPro  7.6  /  Alb  3.7  /  TBili  0.5  /  DBili  x   /  AST  15  /  ALT  30  /  AlkPhos  109  02-20

## 2023-02-21 NOTE — PROVIDER CONTACT NOTE (CRITICAL VALUE NOTIFICATION) - NS PROVIDER READ BACK TO LAB
-- [id number] - [place], [place] - [address]    
Double task   
yes

## 2023-02-21 NOTE — PROGRESS NOTE ADULT - ASSESSMENT
88M w/PMH BPH, Knee pain, Parkinson's disease w/ dementia, HTN, AVR, presents BIB wife from home c/o FTT, decreased po intake over past several days and not walking much d/t knee pain, feet pain.  Pt is AAOx2 (PP), fears food will end up in back of his nose if he eats,   In ED, wbc 10, UA neg, cxr neg, Na 134, Cr 1.6 (prior 1.1), HR 90s, given IVF, morphine x1.     #acute GI bleed   -Hgb 11.9 -> 7.8 -> 6.3 within ~24hrs  -likely GI bleed   -hx of celebrex use   -s/p 1 units pRBC  -transfuse if Hgb <7  -serial h/h   -GI consulted, possible EGD tomorrow       #FTT w/ poor po intake in setting of delirium  - treat underlying cause  - nutrition cs  - supportive care  - aspiration precautions    #Delirium w/ dementia- no e/o infection, possibly d/t dehydration, progression of dementia  #Parkinson's dementia  - psych rec appreciated:     - start seroquel 12.5mg qd     - start seroquel 12.5mg qhs   - outpt f/u w/ his neurologist for eval of PD, dementia  - possibly related to recent edibles- d/w wife    #Chronic Rt knee pain w/ increased swelling of RLE  - RLE doppler with no evidence of dvt   - c/w lido patch, iv dilaudid, ice to knee  - f/u PT eval   - hold tizandine, celebrex home doses    #Feet pain- possibly peripheral neuropathy  - b12, folate nl     #HTN- bp stable  - c/w pt's dose of hydralazine, amlodipine    #MAYRA 2/2 likely dehydration  #hyponatremia, mild  - resolved   - s/p NS @80cc/hr      #PPX- lovenox    Dispo: possible EGD tomorrow AM 88M w/PMH BPH, Knee pain, Parkinson's disease w/ dementia, HTN, AVR, presents BIB wife from home c/o FTT, decreased po intake over past several days and not walking much d/t knee pain, feet pain.  Pt is AAOx2 (PP), fears food will end up in back of his nose if he eats,   In ED, wbc 10, UA neg, cxr neg, Na 134, Cr 1.6 (prior 1.1), HR 90s, given IVF, morphine x1.     #acute GI bleed   -Hgb 11.9 -> 7.8 -> 6.3 within ~24hrs  -likely GI bleed   -hx of celebrex use   -s/p 1 units pRBC  -transfuse if Hgb <7  -serial h/h   -GI consulted, possible EGD tomorrow       #FTT w/ poor po intake in setting of delirium  - treat underlying cause  - nutrition cs  - supportive care  - aspiration precautions    #Delirium w/ dementia- no e/o infection, possibly d/t dehydration, progression of dementia  #Parkinson's dementia  - psych rec appreciated:     - start seroquel 12.5mg qd     - start seroquel 12.5mg qhs   - outpt f/u w/ his neurologist for eval of PD, dementia  - possibly related to recent edibles- d/w wife    #Chronic Rt knee pain w/ increased swelling of RLE  - RLE doppler with no evidence of dvt   - c/w lido patch, iv dilaudid, ice to knee  - f/u PT eval   - hold tizandine, celebrex home doses    #Feet pain- possibly peripheral neuropathy  - b12, folate nl     #HTN- bp stable  - c/w pt's dose of hydralazine, amlodipine    #MAYRA 2/2 likely dehydration  #hyponatremia, mild  - resolved   - s/p NS @80cc/hr    #PPX- lovenox    Dispo: possible EGD tomorrow AM    Discussed with Dr. Henley  88M w/PMH BPH, Knee pain, Parkinson's disease w/ dementia, HTN, AVR, presents BIB wife from home c/o FTT, decreased po intake over past several days and not walking much d/t knee pain, feet pain.  Pt is AAOx2 (PP), fears food will end up in back of his nose if he eats,   In ED, wbc 10, UA neg, cxr neg, Na 134, Cr 1.6 (prior 1.1), HR 90s, given IVF, morphine x1.     #acute GI bleed   -Hgb 11.9 -> 7.8 -> 6.3 within ~24hrs  -likely GI bleed   -hx of celebrex use   -s/p 1 units pRBC  -transfuse if Hgb <7  -serial h/h   -GI consulted, possible EGD tomorrow       #FTT w/ poor po intake in setting of delirium  - treat underlying cause  - nutrition cs  - supportive care  - aspiration precautions    #Delirium w/ dementia- no e/o infection, possibly d/t dehydration, progression of dementia  #Parkinson's dementia  - psych rec appreciated:     - start seroquel 12.5mg qd     - start seroquel 12.5mg qhs   - outpt f/u w/ his neurologist for eval of PD, dementia  - possibly related to recent edibles- d/w wife    #Chronic Rt knee pain w/ increased swelling of RLE  - RLE doppler with no evidence of dvt   - c/w lido patch, iv dilaudid, ice to knee  - f/u PT eval   - hold tizandine, celebrex home doses    #Feet pain- possibly peripheral neuropathy  - b12, folate nl     #HTN- bp stable  - c/w pt's dose of hydralazine, amlodipine    #MAYRA 2/2 likely dehydration  #hyponatremia, mild  - resolved   - s/p NS @80cc/hr    #PPX- d/c lovenox for now for possible EGD    Dispo: possible EGD tomorrow AM    Discussed with Dr. Henley

## 2023-02-21 NOTE — BH CONSULTATION LIAISON ASSESSMENT NOTE - SUMMARY
Patient is a 87 y/o male, , has adult children, retired, domiciled in private home with wife. No formal PPHx, on Lexapro by PCP. No known suicidal ideation/HI. No inpatient hospitalizations. PMHx of Knee pain, Parkinson's disease w/ dementia, HTN, AVR, presents BIB wife from home c/o FTT. Psychiatry consulted for dementia & hallucinations.     Patient is seen resting in bed, he is A&O x1, to self, reports it is 1958, [place] [employer] and can only state is birth month and date, not year. Wife if at bedside to give collateral.    Collateral from [facility]: Reports patient is close to his baseline but is not "usually this bad" at home and when he is not on pain medication. Patient is on Dilaudid in hospital for pain. Reports patient has no formal psychiatric history, has had dementia for years, becomes agitated in the late afternoon and nighttime, was treated with Xanax, with poor effect. Reports patient has recently felt is sweating on his arm but there is nothing there, also is having hallucinations of having food stuck in his nose. He has been worked up outpatient by multiple specialists with not specific findings.     Plan:    1) Start Seroquel 12.5 mg @ 1600  2) Start Seroquel 12.5 mg QHS  3) Psychiatry to follow

## 2023-02-21 NOTE — BH CONSULTATION LIAISON ASSESSMENT NOTE - NSBHCHARTREVIEWVS_PSY_A_CORE FT
Vital Signs Last 24 Hrs  T(C): 36.7 (21 Feb 2023 07:27), Max: 36.8 (20 Feb 2023 18:00)  T(F): 98 (21 Feb 2023 07:27), Max: 98.2 (20 Feb 2023 18:00)  HR: 110 (21 Feb 2023 07:27) (86 - 110)  BP: 108/52 (21 Feb 2023 07:27) (108/52 - 149/77)  BP(mean): --  RR: 19 (21 Feb 2023 07:27) (18 - 20)  SpO2: 100% (21 Feb 2023 07:27) (97% - 100%)    Parameters below as of 21 Feb 2023 07:27  Patient On (Oxygen Delivery Method): room air

## 2023-02-21 NOTE — DIETITIAN INITIAL EVALUATION ADULT - FUNCTIONAL SCREEN CURRENT LEVEL: SWALLOWING (IF SCORE 2 OR MORE FOR ANY ITEM, CONSULT REHAB SERVICES), MLM)
DIABETES INSTRUCTIONS:    1. Check your blood sugars before meals and before bed.    2. See diabetes educator within the next two weeks. Bring your meter to your appointment. You should make an appointment with Endocrinology at [facility] in 4 - 6 weeks.    3. Call us if your blood sugars are less than 70 mg/dL or greater than 250 mg/dL for more than two readings in a row.    Endocrinology        DIABETES MEDICATION INSTRUCTIONS    Take metformin 500 mg twice a day.    Take long acting insulin Lantus 45 units every evening.         Take short acting insulin Humalog per the following scale. DO NOT TAKE IF NOT EATING.     Blood Sugar Breakfast Lunch Dinner   Less than 100 16 16 16   100-150 20 20 20   151-200 22 22 22   201-250 24 24 24   251 or higher 26 26 26     Recommended Blood Glucose Targets:  · Fasting and Premeal  mg/dL  · 2 hours after meal  mg/dL    
2 = difficulty swallowing foods

## 2023-02-21 NOTE — DIETITIAN INITIAL EVALUATION ADULT - PERTINENT LABORATORY DATA
02-21    138  |  109<H>  |  36<H>  ----------------------------<  108<H>  4.7   |  24  |  1.28    Ca    8.6      21 Feb 2023 06:32  Phos  3.2     02-20  Mg     2.0     02-20    TPro  7.6  /  Alb  3.7  /  TBili  0.5  /  DBili  x   /  AST  15  /  ALT  30  /  AlkPhos  109  02-20   02-21    138  |  109<H>  |  36<H>  ----------------------------<  108<H>  4.7   |  24  |  1.28    Ca    8.6      21 Feb 2023 06:32  Phos  3.2     02-20  Mg     2.0     02-20    TPro  7.6  /  Alb  3.7  /  TBili  0.5  /  DBili  x   /  AST  15  /  ALT  30  /  AlkPhos  109  02-20    **Elevated glucose 2/2 possible thiamin def. / severe infection / hyperthyroidism / prolonged physical inactivity / chronic malnutrition; elevated BUN 2/2 possible renal failure /  shock / dehydration / infection / excessive pro catabolism

## 2023-02-21 NOTE — BH CONSULTATION LIAISON ASSESSMENT NOTE - HPI (INCLUDE ILLNESS QUALITY, SEVERITY, DURATION, TIMING, CONTEXT, MODIFYING FACTORS, ASSOCIATED SIGNS AND SYMPTOMS)
Patient is a 87 y/o male, , has adult children, retired, domiciled in private home with wife. No formal PPHx, on Lexapro by PCP. No known suicidal ideation/HI. No inpatient hospitalizations. PMHx of Knee pain, Parkinson's disease w/ dementia, HTN, AVR, presents BIB wife from home c/o FTT. Psychiatry consulted for dementia & hallucinations.     Patient is seen resting in bed, he is A&O x1, to self, reports it is 1958, [place] [employer] and can only state is birth month and date, not year. Wife if at bedside to give collateral.    Collateral from [facility]ani: Reports patient is close to his baseline but is not "usually this bad" at home and when he is not on pain medication. Patient is on Dilaudid in hospital for pain. Reports patient has no formal psychiatric history, has had dementia for years, becomes agitated in the late afternoon and nighttime, was treated with Xanax, with poor effect. Reports patient has recently felt is sweating on his arm but there is nothing there, also is having hallucinations of having food stuck in his nose. He has been worked up outpatient by multiple specialists with not specific findings.

## 2023-02-21 NOTE — BH CONSULTATION LIAISON ASSESSMENT NOTE - NSBHATTESTAPPBILLTIME_PSY_A_CORE
I attest my time as SARI is greater than 50% of the total combined time spent on qualifying patient care activities. I have reviewed and verified the documentation.

## 2023-02-22 DIAGNOSIS — Z01.810 ENCOUNTER FOR PREPROCEDURAL CARDIOVASCULAR EXAMINATION: ICD-10-CM

## 2023-02-22 DIAGNOSIS — I25.10 ATHEROSCLEROTIC HEART DISEASE OF NATIVE CORONARY ARTERY WITHOUT ANGINA PECTORIS: ICD-10-CM

## 2023-02-22 LAB
ANION GAP SERPL CALC-SCNC: 5 MMOL/L — SIGNIFICANT CHANGE UP (ref 5–17)
APTT BLD: 24.9 SEC — LOW (ref 27.5–35.5)
BUN SERPL-MCNC: 38 MG/DL — HIGH (ref 7–23)
CALCIUM SERPL-MCNC: 8.8 MG/DL — SIGNIFICANT CHANGE UP (ref 8.5–10.1)
CHLORIDE SERPL-SCNC: 111 MMOL/L — HIGH (ref 96–108)
CO2 SERPL-SCNC: 24 MMOL/L — SIGNIFICANT CHANGE UP (ref 22–31)
CREAT SERPL-MCNC: 1.27 MG/DL — SIGNIFICANT CHANGE UP (ref 0.5–1.3)
EGFR: 55 ML/MIN/1.73M2 — LOW
GLUCOSE SERPL-MCNC: 93 MG/DL — SIGNIFICANT CHANGE UP (ref 70–99)
HCT VFR BLD CALC: 22.1 % — LOW (ref 39–50)
HCT VFR BLD CALC: 23.2 % — LOW (ref 39–50)
HCT VFR BLD CALC: 26.6 % — LOW (ref 39–50)
HGB BLD-MCNC: 7.2 G/DL — LOW (ref 13–17)
HGB BLD-MCNC: 7.6 G/DL — LOW (ref 13–17)
HGB BLD-MCNC: 8.7 G/DL — LOW (ref 13–17)
INR BLD: 0.97 RATIO — SIGNIFICANT CHANGE UP (ref 0.88–1.16)
MCHC RBC-ENTMCNC: 30.6 PG — SIGNIFICANT CHANGE UP (ref 27–34)
MCHC RBC-ENTMCNC: 30.8 PG — SIGNIFICANT CHANGE UP (ref 27–34)
MCHC RBC-ENTMCNC: 32.6 GM/DL — SIGNIFICANT CHANGE UP (ref 32–36)
MCHC RBC-ENTMCNC: 32.8 GM/DL — SIGNIFICANT CHANGE UP (ref 32–36)
MCV RBC AUTO: 93.9 FL — SIGNIFICANT CHANGE UP (ref 80–100)
MCV RBC AUTO: 94 FL — SIGNIFICANT CHANGE UP (ref 80–100)
PLATELET # BLD AUTO: 141 K/UL — LOW (ref 150–400)
PLATELET # BLD AUTO: 145 K/UL — LOW (ref 150–400)
POTASSIUM SERPL-MCNC: 4.3 MMOL/L — SIGNIFICANT CHANGE UP (ref 3.5–5.3)
POTASSIUM SERPL-SCNC: 4.3 MMOL/L — SIGNIFICANT CHANGE UP (ref 3.5–5.3)
PROTHROM AB SERPL-ACNC: 11.3 SEC — SIGNIFICANT CHANGE UP (ref 10.5–13.4)
RBC # BLD: 2.35 M/UL — LOW (ref 4.2–5.8)
RBC # BLD: 2.47 M/UL — LOW (ref 4.2–5.8)
RBC # FLD: 12.5 % — SIGNIFICANT CHANGE UP (ref 10.3–14.5)
RBC # FLD: 12.6 % — SIGNIFICANT CHANGE UP (ref 10.3–14.5)
SODIUM SERPL-SCNC: 140 MMOL/L — SIGNIFICANT CHANGE UP (ref 135–145)
WBC # BLD: 6.58 K/UL — SIGNIFICANT CHANGE UP (ref 3.8–10.5)
WBC # BLD: 7.78 K/UL — SIGNIFICANT CHANGE UP (ref 3.8–10.5)
WBC # FLD AUTO: 6.58 K/UL — SIGNIFICANT CHANGE UP (ref 3.8–10.5)
WBC # FLD AUTO: 7.78 K/UL — SIGNIFICANT CHANGE UP (ref 3.8–10.5)

## 2023-02-22 PROCEDURE — 99232 SBSQ HOSP IP/OBS MODERATE 35: CPT | Mod: GC

## 2023-02-22 PROCEDURE — 99231 SBSQ HOSP IP/OBS SF/LOW 25: CPT

## 2023-02-22 PROCEDURE — 99223 1ST HOSP IP/OBS HIGH 75: CPT

## 2023-02-22 RX ORDER — QUETIAPINE FUMARATE 200 MG/1
12.5 TABLET, FILM COATED ORAL ONCE
Refills: 0 | Status: COMPLETED | OUTPATIENT
Start: 2023-02-22 | End: 2023-02-22

## 2023-02-22 RX ADMIN — HYDROMORPHONE HYDROCHLORIDE 0.5 MILLIGRAM(S): 2 INJECTION INTRAMUSCULAR; INTRAVENOUS; SUBCUTANEOUS at 19:33

## 2023-02-22 RX ADMIN — ATORVASTATIN CALCIUM 10 MILLIGRAM(S): 80 TABLET, FILM COATED ORAL at 21:12

## 2023-02-22 RX ADMIN — HYDROMORPHONE HYDROCHLORIDE 0.5 MILLIGRAM(S): 2 INJECTION INTRAMUSCULAR; INTRAVENOUS; SUBCUTANEOUS at 10:30

## 2023-02-22 RX ADMIN — LIDOCAINE 1 PATCH: 4 CREAM TOPICAL at 11:11

## 2023-02-22 RX ADMIN — HYDROMORPHONE HYDROCHLORIDE 0.25 MILLIGRAM(S): 2 INJECTION INTRAMUSCULAR; INTRAVENOUS; SUBCUTANEOUS at 04:08

## 2023-02-22 RX ADMIN — PANTOPRAZOLE SODIUM 40 MILLIGRAM(S): 20 TABLET, DELAYED RELEASE ORAL at 11:01

## 2023-02-22 RX ADMIN — QUETIAPINE FUMARATE 12.5 MILLIGRAM(S): 200 TABLET, FILM COATED ORAL at 21:12

## 2023-02-22 RX ADMIN — PANTOPRAZOLE SODIUM 40 MILLIGRAM(S): 20 TABLET, DELAYED RELEASE ORAL at 21:13

## 2023-02-22 RX ADMIN — Medication 10 MILLIGRAM(S): at 18:34

## 2023-02-22 RX ADMIN — HYDROMORPHONE HYDROCHLORIDE 0.25 MILLIGRAM(S): 2 INJECTION INTRAMUSCULAR; INTRAVENOUS; SUBCUTANEOUS at 21:13

## 2023-02-22 RX ADMIN — POLYETHYLENE GLYCOL 3350 17 GRAM(S): 17 POWDER, FOR SOLUTION ORAL at 11:01

## 2023-02-22 RX ADMIN — ESCITALOPRAM OXALATE 20 MILLIGRAM(S): 10 TABLET, FILM COATED ORAL at 11:11

## 2023-02-22 RX ADMIN — TAMSULOSIN HYDROCHLORIDE 0.8 MILLIGRAM(S): 0.4 CAPSULE ORAL at 21:12

## 2023-02-22 RX ADMIN — FINASTERIDE 5 MILLIGRAM(S): 5 TABLET, FILM COATED ORAL at 11:01

## 2023-02-22 RX ADMIN — SENNA PLUS 2 TABLET(S): 8.6 TABLET ORAL at 21:12

## 2023-02-22 RX ADMIN — HYDROMORPHONE HYDROCHLORIDE 0.25 MILLIGRAM(S): 2 INJECTION INTRAMUSCULAR; INTRAVENOUS; SUBCUTANEOUS at 04:45

## 2023-02-22 RX ADMIN — HYDROMORPHONE HYDROCHLORIDE 0.5 MILLIGRAM(S): 2 INJECTION INTRAMUSCULAR; INTRAVENOUS; SUBCUTANEOUS at 18:35

## 2023-02-22 RX ADMIN — HYDROMORPHONE HYDROCHLORIDE 0.5 MILLIGRAM(S): 2 INJECTION INTRAMUSCULAR; INTRAVENOUS; SUBCUTANEOUS at 09:24

## 2023-02-22 RX ADMIN — QUETIAPINE FUMARATE 12.5 MILLIGRAM(S): 200 TABLET, FILM COATED ORAL at 13:53

## 2023-02-22 NOTE — CDI QUERY NOTE - NSCDIOTHERTXTBX_GEN_ALL_CORE_HH
This patient was admitted with failure to thrive and your clarification is needed regarding the diagnosis of GI Bleed admission:    Progress Note Adult Family Medicine Resident 2-21-23 @ 19:58  #acute GI bleed   -Hgb 11.9 -> 7.8 -> 6.3 within ~24hrs  -likely GI bleed   -hx of celebrex use   -s/p 1 units pRBC  -transfuse if Hgb <7  -serial h/h   -GI consulted, possible EGD tomorrow     HP Adult 2-20-23 @ 17:16  #FTT w/ poor po intake in setting of delirium  - treat underlying cause  - nutrition cs  - supportive care  - aspiration precautions    Labs:  Hemoglobin: 11.9 g/dL (02.20.23 @ 12:13)  Hemoglobin: 6.3: L (02.21.23 @ 15:37)    Can the diagnosis of GI Bleed on admission, be specified?  -GI Bleed, present on admission  -GI Bleed developed after admission  -Other, please specify:  -Unable to determine.

## 2023-02-22 NOTE — PROVIDER CONTACT NOTE (OTHER) - SITUATION
Pt is 88yr old male admitted failure to thrive. PMH of dementia. Pt noted of pulling at his alford, redirections failed and alford draining red blood.
patient had dark burgandy, tarry stool and a drop in hemeglobin

## 2023-02-22 NOTE — PROGRESS NOTE ADULT - ASSESSMENT
88M w/PMH BPH, Knee pain, Parkinson's disease w/ dementia, HTN, AVR, presents BIB wife from home c/o FTT, decreased po intake over past several days and not walking much d/t knee pain, feet pain.  Pt is AAOx2 (PP), fears food will end up in back of his nose if he eats,   In ED, wbc 10, UA neg, cxr neg, Na 134, Cr 1.6 (prior 1.1), HR 90s, given IVF, morphine x1.     #acute GI bleed developed after the admission   -Hgb 11.9 -> 7.8 -> 6.3 within ~24hrs  -hx of celebrex use   -s/p 2 units pRBC  -transfuse if Hgb <7  -serial h/h   -GI consulted, possible EGD today  -Cardio rec appreciated: no cardiac contraindication for EGD       #FTT w/ poor po intake in setting of delirium  - treat underlying cause  - nutrition cs  - supportive care  - aspiration precautions    #Delirium w/ dementia- no e/o infection, possibly d/t dehydration, progression of dementia  #Parkinson's dementia  - psych rec appreciated:     - escitalopram 20mg qd      - start seroquel 12.5mg qd     - start seroquel 12.5mg qhs      - melatonin 3mg qhs      - ondansetron 4mg IV 8hrs PRN for n/v  - outpt f/u w/ his neurologist for eval of PD, dementia  - possibly related to recent edibles- d/w wife    #Chronic Rt knee pain w/ increased swelling of RLE  - RLE doppler with no evidence of dvt   - c/w lido patch, iv dilaudid, ice to knee  - f/u PT eval   - hold tizandine, celebrex home doses    #Feet pain- possibly peripheral neuropathy  - b12, folate nl     #HTN- bp stable  - c/w pt's dose of hydralazine, amlodipine    #MAYRA 2/2 likely dehydration  #hyponatremia, mild  - resolved   - s/p NS @80cc/hr    #PPX- d/c lovenox for now for possible EGD    Dispo: pending EGD     Discussed with Dr. Henley

## 2023-02-22 NOTE — PROGRESS NOTE ADULT - SUBJECTIVE AND OBJECTIVE BOX
Pt has been seen and examined with FP resident, resident supervised agree with a/p       Patient is a 88y old  Male who presents with a chief complaint of FTT, pain in RLE (21 Feb 2023 19:58)      HPI:  HPI:  88M w/PMH BPH, Knee pain, Parkinson's disease w/ dementia, HTN, AVR, presents BIB wife from home c/o FTT, decreased po intake over past several days and not walking much d/t knee pain, feet pain.          PHYSICAL EXAM:  Vital Signs Last 24 Hrs  T(C): 37.1 (22 Feb 2023 13:00), Max: 37.1 (21 Feb 2023 21:24)  T(F): 98.8 (22 Feb 2023 13:00), Max: 98.8 (21 Feb 2023 21:24)  HR: 93 (22 Feb 2023 13:00) (84 - 103)  BP: 136/69 (22 Feb 2023 13:00) (102/58 - 151/76)  BP(mean): --  RR: 17 (22 Feb 2023 13:00) (16 - 20)  SpO2: 97% (22 Feb 2023 13:00) (97% - 99%)    Parameters below as of 22 Feb 2023 13:00  Patient On (Oxygen Delivery Method): room air      -rs-aeeb, cta  -cvs-s1s2 normal   -p/a-soft,bs+      A/P    # GI Bleed-most likely was  present on admission  -scopy study today probably   -ct iv ppi     #dvt pr

## 2023-02-22 NOTE — CONSULT NOTE ADULT - PROBLEM SELECTOR RECOMMENDATION 9
Pt denies exertional symptoms. No acute issues in cardiac follow up at [facility]. No cardiac contraindication for planned EGD in the face of progressive anemia. Recommend maintenance of Hgb >8 for coronary perfusion.

## 2023-02-22 NOTE — PROGRESS NOTE ADULT - SUBJECTIVE AND OBJECTIVE BOX
HPI:  88M w/PMH BPH, Knee pain, Parkinson's disease w/ dementia, HTN, AVR, presents BIB wife from home c/o FTT, decreased po intake over past several days and not walking much d/t knee pain, feet pain.  Pt is AAOx2 (PP), hx obtained from wife at bedside.  She endorses that pt normally ambulatory but over past several days, he's been more WC and Bedbound.  Wife also endorses that pt not eating as he c/o fear of food will go up his nose and has been having sensation of diaphoresis at night, although he's not sweating.  She's started giving pt "edibles" hoping that it will help calm him down but not much relief from pain.  She denies any notable fever, n/v/d, abd pain, sob, cough.  Pt can confirm that he doesn't want to eat out of fear of getting food in the back of his nose and tells me that his Rt knee and bottom of his feet hurt.  In ED, wbc 10, UA neg, cxr neg, Na 134, Cr 1.6 (prior 1.1), HR 90s, given IVF, morphine x1.     Subjective: Pt seen and evaluated by bedside. Pt with no new complaints.     REVIEW OF SYSTEMS:  unable to assess due to dementia         =======================================================  Vitals:  T(F): 99 (22 Feb 2023 18:55), Max: 99.1 (22 Feb 2023 17:50)  HR: 100 (22 Feb 2023 18:55)  BP: 141/78 (22 Feb 2023 18:55)  RR: 18 (22 Feb 2023 18:55)  SpO2: 96% (22 Feb 2023 18:55) (96% - 99%)  temp max in last 48H T(F): , Max: 99.1 (02-22-23 @ 17:50)      =======================================================    PHYSICAL EXAM:  Constitutional: Pt lying in bed, awake and alert, NAD  HEENT: EOMI, normal hearing, dry mucous membranes  Neck: Soft and supple, no JVD  Respiratory: CTABL, No wheezing, rales or rhonchi  Cardiovascular: S1S2+, RRR, no M/G/R  Gastrointestinal: BS+, soft, NT/ND, no guarding, no rebound  Extremities: calf swollen b/l  and tender to palpation, b/l knee swelling and pain   Vascular: 2+ peripheral pulses  Neurological: AAOx2, no focal deficits  Musculoskeletal: 5/5 strength b/l upper and lower extremities  Skin: No rashes    =======================================================  Current Antibiotics:    Other medications:  amLODIPine   Tablet 5 milliGRAM(s) Oral daily  atorvastatin 10 milliGRAM(s) Oral at bedtime  escitalopram 20 milliGRAM(s) Oral daily  finasteride 5 milliGRAM(s) Oral daily  hydrALAZINE 10 milliGRAM(s) Oral two times a day  lidocaine   4% Patch 1 Patch Transdermal daily  naloxone Injectable 0.4 milliGRAM(s) IV Push once  pantoprazole  Injectable 40 milliGRAM(s) IV Push two times a day  polyethylene glycol 3350 17 Gram(s) Oral daily  QUEtiapine 12.5 milliGRAM(s) Oral <User Schedule>  QUEtiapine 12.5 milliGRAM(s) Oral at bedtime  senna 2 Tablet(s) Oral at bedtime  tamsulosin 0.8 milliGRAM(s) Oral at bedtime      =======================================================  Labs:                        8.7    x     )-----------( x        ( 22 Feb 2023 18:12 )             26.6     02-22    140  |  111<H>  |  38<H>  ----------------------------<  93  4.3   |  24  |  1.27    Ca    8.8      22 Feb 2023 06:33        Culture - Urine (collected 02-20-23 @ 12:13)  Source: Clean Catch Clean Catch (Midstream)  Final Report (02-21-23 @ 14:59):    No growth        SARS-CoV-2 Result: NotDete (02-20-23 @ 15:04)      02-21-23 @ 07:01  -  02-22-23 @ 07:00  --------------------------------------------------------  IN: 850 mL / OUT: 300 mL / NET: 550 mL    02-22-23 @ 07:01  -  02-22-23 @ 20:21  --------------------------------------------------------  IN: 0 mL / OUT: 1275 mL / NET: -1275 mL        ========================================================  Imaging: no new imaging        HPI:  88M w/PMH BPH, Knee pain, Parkinson's disease w/ dementia, HTN, AVR, presented ED on 2/20/23 BIB wife from home c/o FTT, decreased po intake over past several days and not walking much d/t knee pain, feet pain.  Pt is AAOx2 (PP), hx obtained from wife at bedside.  She denied any notable fever, n/v/d, abd pain, sob, cough. In ED, wbc 10, UA neg, cxr neg, Na 134, Cr 1.6 (prior 1.1), HR 90s, given IVF, morphine x1. US doppler showed no evidence of DVT. Pt was admitted for further management.  Right knee and feet pain was managed with IV Dialudid and lidocain patch. On second day of hospitalization, pt became anemic(Hgb 11.9 to 6.3) requiring 1 unit of pRBC. Repeat hgb was 7.1 after the transfusion. Pt was given another 1 unit. Gastroenterologist was consulted and EGD was done which showed non bleeding duodenal ulcers. Pt with history of recent use of celecoxib. Urology was consulted due to urinary retention. Sherman catheter was placed, then discontinued as pt successful with void trial. Psychiatric medication was optimized by the psychiatrist. Family requested palliative care. [facility].     Subjective: Pt seen and evaluated by bedside. Pt with no new complaints.     REVIEW OF SYSTEMS:  unable to assess due to dementia         =======================================================  Vitals:  T(F): 99 (22 Feb 2023 18:55), Max: 99.1 (22 Feb 2023 17:50)  HR: 100 (22 Feb 2023 18:55)  BP: 141/78 (22 Feb 2023 18:55)  RR: 18 (22 Feb 2023 18:55)  SpO2: 96% (22 Feb 2023 18:55) (96% - 99%)  temp max in last 48H T(F): , Max: 99.1 (02-22-23 @ 17:50)      =======================================================    PHYSICAL EXAM:  Constitutional: Pt lying in bed, awake and alert, NAD  HEENT: EOMI, normal hearing, dry mucous membranes  Neck: Soft and supple, no JVD  Respiratory: CTABL, No wheezing, rales or rhonchi  Cardiovascular: S1S2+, RRR, no M/G/R  Gastrointestinal: BS+, soft, NT/ND, no guarding, no rebound  Extremities: calf swollen b/l  and tender to palpation, b/l knee swelling and pain   Vascular: 2+ peripheral pulses  Neurological: AAOx2, no focal deficits  Musculoskeletal: 5/5 strength b/l upper and lower extremities  Skin: No rashes    =======================================================  Current Antibiotics:    Other medications:  amLODIPine   Tablet 5 milliGRAM(s) Oral daily  atorvastatin 10 milliGRAM(s) Oral at bedtime  escitalopram 20 milliGRAM(s) Oral daily  finasteride 5 milliGRAM(s) Oral daily  hydrALAZINE 10 milliGRAM(s) Oral two times a day  lidocaine   4% Patch 1 Patch Transdermal daily  naloxone Injectable 0.4 milliGRAM(s) IV Push once  pantoprazole  Injectable 40 milliGRAM(s) IV Push two times a day  polyethylene glycol 3350 17 Gram(s) Oral daily  QUEtiapine 12.5 milliGRAM(s) Oral <User Schedule>  QUEtiapine 12.5 milliGRAM(s) Oral at bedtime  senna 2 Tablet(s) Oral at bedtime  tamsulosin 0.8 milliGRAM(s) Oral at bedtime      =======================================================  Labs:                        8.7    x     )-----------( x        ( 22 Feb 2023 18:12 )             26.6     02-22    140  |  111<H>  |  38<H>  ----------------------------<  93  4.3   |  24  |  1.27    Ca    8.8      22 Feb 2023 06:33        Culture - Urine (collected 02-20-23 @ 12:13)  Source: Clean Catch Clean Catch (Midstream)  Final Report (02-21-23 @ 14:59):    No growth        SARS-CoV-2 Result: NotDete (02-20-23 @ 15:04)      02-21-23 @ 07:01  -  02-22-23 @ 07:00  --------------------------------------------------------  IN: 850 mL / OUT: 300 mL / NET: 550 mL    02-22-23 @ 07:01  -  02-22-23 @ 20:21  --------------------------------------------------------  IN: 0 mL / OUT: 1275 mL / NET: -1275 mL        ========================================================  Imaging: no new imaging

## 2023-02-22 NOTE — CONSULT NOTE ADULT - PROBLEM SELECTOR RECOMMENDATION 2
Pt is at increased risk for periprocedural complications due to age and PD with underlying CAD. The patient appears optimized at this time for a necessary procedure.

## 2023-02-23 LAB
ANION GAP SERPL CALC-SCNC: 8 MMOL/L — SIGNIFICANT CHANGE UP (ref 5–17)
BUN SERPL-MCNC: 33 MG/DL — HIGH (ref 7–23)
CALCIUM SERPL-MCNC: 8.9 MG/DL — SIGNIFICANT CHANGE UP (ref 8.5–10.1)
CHLORIDE SERPL-SCNC: 111 MMOL/L — HIGH (ref 96–108)
CO2 SERPL-SCNC: 22 MMOL/L — SIGNIFICANT CHANGE UP (ref 22–31)
CREAT SERPL-MCNC: 1.19 MG/DL — SIGNIFICANT CHANGE UP (ref 0.5–1.3)
EGFR: 59 ML/MIN/1.73M2 — LOW
GLUCOSE SERPL-MCNC: 119 MG/DL — HIGH (ref 70–99)
HCT VFR BLD CALC: 26.4 % — LOW (ref 39–50)
HCT VFR BLD CALC: 26.5 % — LOW (ref 39–50)
HGB BLD-MCNC: 8.8 G/DL — LOW (ref 13–17)
HGB BLD-MCNC: 8.9 G/DL — LOW (ref 13–17)
MCHC RBC-ENTMCNC: 30.7 PG — SIGNIFICANT CHANGE UP (ref 27–34)
MCHC RBC-ENTMCNC: 31.2 PG — SIGNIFICANT CHANGE UP (ref 27–34)
MCHC RBC-ENTMCNC: 33.3 GM/DL — SIGNIFICANT CHANGE UP (ref 32–36)
MCHC RBC-ENTMCNC: 33.6 GM/DL — SIGNIFICANT CHANGE UP (ref 32–36)
MCV RBC AUTO: 91.4 FL — SIGNIFICANT CHANGE UP (ref 80–100)
MCV RBC AUTO: 93.6 FL — SIGNIFICANT CHANGE UP (ref 80–100)
OB PNL STL: POSITIVE
PLATELET # BLD AUTO: 158 K/UL — SIGNIFICANT CHANGE UP (ref 150–400)
PLATELET # BLD AUTO: 161 K/UL — SIGNIFICANT CHANGE UP (ref 150–400)
POTASSIUM SERPL-MCNC: 4 MMOL/L — SIGNIFICANT CHANGE UP (ref 3.5–5.3)
POTASSIUM SERPL-SCNC: 4 MMOL/L — SIGNIFICANT CHANGE UP (ref 3.5–5.3)
RBC # BLD: 2.82 M/UL — LOW (ref 4.2–5.8)
RBC # BLD: 2.9 M/UL — LOW (ref 4.2–5.8)
RBC # FLD: 13.2 % — SIGNIFICANT CHANGE UP (ref 10.3–14.5)
RBC # FLD: 13.2 % — SIGNIFICANT CHANGE UP (ref 10.3–14.5)
SODIUM SERPL-SCNC: 141 MMOL/L — SIGNIFICANT CHANGE UP (ref 135–145)
WBC # BLD: 14.08 K/UL — HIGH (ref 3.8–10.5)
WBC # BLD: 15.92 K/UL — HIGH (ref 3.8–10.5)
WBC # FLD AUTO: 14.08 K/UL — HIGH (ref 3.8–10.5)
WBC # FLD AUTO: 15.92 K/UL — HIGH (ref 3.8–10.5)

## 2023-02-23 PROCEDURE — 99232 SBSQ HOSP IP/OBS MODERATE 35: CPT | Mod: GC

## 2023-02-23 PROCEDURE — 99231 SBSQ HOSP IP/OBS SF/LOW 25: CPT

## 2023-02-23 RX ORDER — QUETIAPINE FUMARATE 200 MG/1
12.5 TABLET, FILM COATED ORAL
Qty: 30 | Refills: 0
Start: 2023-02-23 | End: 2023-03-24

## 2023-02-23 RX ORDER — CELECOXIB 200 MG/1
1 CAPSULE ORAL
Qty: 0 | Refills: 0 | DISCHARGE

## 2023-02-23 RX ORDER — HYDRALAZINE HCL 50 MG
0 TABLET ORAL
Qty: 0 | Refills: 0 | DISCHARGE

## 2023-02-23 RX ORDER — PANTOPRAZOLE SODIUM 20 MG/1
1 TABLET, DELAYED RELEASE ORAL
Qty: 60 | Refills: 0
Start: 2023-02-23 | End: 2023-03-24

## 2023-02-23 RX ORDER — MOMETASONE FUROATE 1 MG/G
0 CREAM TOPICAL
Qty: 0 | Refills: 0 | DISCHARGE

## 2023-02-23 RX ORDER — PANTOPRAZOLE SODIUM 20 MG/1
40 TABLET, DELAYED RELEASE ORAL
Refills: 0 | Status: DISCONTINUED | OUTPATIENT
Start: 2023-02-23 | End: 2023-02-27

## 2023-02-23 RX ADMIN — HYDROMORPHONE HYDROCHLORIDE 0.25 MILLIGRAM(S): 2 INJECTION INTRAMUSCULAR; INTRAVENOUS; SUBCUTANEOUS at 12:37

## 2023-02-23 RX ADMIN — FINASTERIDE 5 MILLIGRAM(S): 5 TABLET, FILM COATED ORAL at 09:48

## 2023-02-23 RX ADMIN — QUETIAPINE FUMARATE 12.5 MILLIGRAM(S): 200 TABLET, FILM COATED ORAL at 23:31

## 2023-02-23 RX ADMIN — SENNA PLUS 2 TABLET(S): 8.6 TABLET ORAL at 23:29

## 2023-02-23 RX ADMIN — TAMSULOSIN HYDROCHLORIDE 0.8 MILLIGRAM(S): 0.4 CAPSULE ORAL at 23:30

## 2023-02-23 RX ADMIN — ATORVASTATIN CALCIUM 10 MILLIGRAM(S): 80 TABLET, FILM COATED ORAL at 23:30

## 2023-02-23 RX ADMIN — HYDROMORPHONE HYDROCHLORIDE 0.25 MILLIGRAM(S): 2 INJECTION INTRAMUSCULAR; INTRAVENOUS; SUBCUTANEOUS at 12:19

## 2023-02-23 RX ADMIN — LIDOCAINE 1 PATCH: 4 CREAM TOPICAL at 09:49

## 2023-02-23 RX ADMIN — Medication 0.5 MILLIGRAM(S): at 15:01

## 2023-02-23 RX ADMIN — HYDROMORPHONE HYDROCHLORIDE 0.25 MILLIGRAM(S): 2 INJECTION INTRAMUSCULAR; INTRAVENOUS; SUBCUTANEOUS at 02:11

## 2023-02-23 RX ADMIN — QUETIAPINE FUMARATE 12.5 MILLIGRAM(S): 200 TABLET, FILM COATED ORAL at 16:22

## 2023-02-23 RX ADMIN — HYDROMORPHONE HYDROCHLORIDE 0.5 MILLIGRAM(S): 2 INJECTION INTRAMUSCULAR; INTRAVENOUS; SUBCUTANEOUS at 15:02

## 2023-02-23 RX ADMIN — HYDROMORPHONE HYDROCHLORIDE 0.5 MILLIGRAM(S): 2 INJECTION INTRAMUSCULAR; INTRAVENOUS; SUBCUTANEOUS at 10:05

## 2023-02-23 RX ADMIN — AMLODIPINE BESYLATE 5 MILLIGRAM(S): 2.5 TABLET ORAL at 06:36

## 2023-02-23 RX ADMIN — HYDROMORPHONE HYDROCHLORIDE 0.5 MILLIGRAM(S): 2 INJECTION INTRAMUSCULAR; INTRAVENOUS; SUBCUTANEOUS at 09:49

## 2023-02-23 RX ADMIN — Medication 10 MILLIGRAM(S): at 06:36

## 2023-02-23 RX ADMIN — HYDROMORPHONE HYDROCHLORIDE 0.5 MILLIGRAM(S): 2 INJECTION INTRAMUSCULAR; INTRAVENOUS; SUBCUTANEOUS at 15:15

## 2023-02-23 RX ADMIN — ESCITALOPRAM OXALATE 20 MILLIGRAM(S): 10 TABLET, FILM COATED ORAL at 09:49

## 2023-02-23 RX ADMIN — POLYETHYLENE GLYCOL 3350 17 GRAM(S): 17 POWDER, FOR SOLUTION ORAL at 09:49

## 2023-02-23 NOTE — PROGRESS NOTE ADULT - SUBJECTIVE AND OBJECTIVE BOX
Interval History:    MEDICATIONS  (STANDING):  amLODIPine   Tablet 5 milliGRAM(s) Oral daily  atorvastatin 10 milliGRAM(s) Oral at bedtime  escitalopram 20 milliGRAM(s) Oral daily  finasteride 5 milliGRAM(s) Oral daily  hydrALAZINE 10 milliGRAM(s) Oral two times a day  lidocaine   4% Patch 1 Patch Transdermal daily  naloxone Injectable 0.4 milliGRAM(s) IV Push once  pantoprazole  Injectable 40 milliGRAM(s) IV Push two times a day  polyethylene glycol 3350 17 Gram(s) Oral daily  QUEtiapine 12.5 milliGRAM(s) Oral <User Schedule>  QUEtiapine 12.5 milliGRAM(s) Oral at bedtime  senna 2 Tablet(s) Oral at bedtime  tamsulosin 0.8 milliGRAM(s) Oral at bedtime    MEDICATIONS  (PRN):  acetaminophen     Tablet .. 650 milliGRAM(s) Oral every 6 hours PRN Temp greater or equal to 38C (100.4F), Mild Pain (1 - 3)  aluminum hydroxide/magnesium hydroxide/simethicone Suspension 30 milliLiter(s) Oral every 4 hours PRN Dyspepsia  bisacodyl 5 milliGRAM(s) Oral daily PRN Constipation  HYDROmorphone  Injectable 0.25 milliGRAM(s) IV Push every 4 hours PRN Moderate Pain (4 - 6)  HYDROmorphone  Injectable 0.5 milliGRAM(s) IV Push every 4 hours PRN Severe Pain (7 - 10)  melatonin 3 milliGRAM(s) Oral at bedtime PRN Insomnia  ondansetron Injectable 4 milliGRAM(s) IV Push every 8 hours PRN Nausea and/or Vomiting      Daily     Daily   BMI: 27.4 (02-20 @ 11:19)  Change in Weight:  Vital Signs Last 24 Hrs  T(C): 36.8 (22 Feb 2023 23:40), Max: 37.3 (22 Feb 2023 17:50)  T(F): 98.2 (22 Feb 2023 23:40), Max: 99.1 (22 Feb 2023 17:50)  HR: 115 (23 Feb 2023 06:32) (90 - 115)  BP: 117/93 (23 Feb 2023 06:32) (102/58 - 142/73)  BP(mean): --  RR: 18 (22 Feb 2023 18:55) (17 - 18)  SpO2: 93% (22 Feb 2023 23:40) (93% - 98%)    Parameters below as of 22 Feb 2023 18:55  Patient On (Oxygen Delivery Method): room air      I&O's Detail    22 Feb 2023 07:01  -  23 Feb 2023 07:00  --------------------------------------------------------  IN:  Total IN: 0 mL    OUT:    Indwelling Catheter - Urethral (mL): 1275 mL  Total OUT: 1275 mL    Total NET: -1275 mL          PHYSICAL EXAM    HEENT:    Normal appearance of conjunctiva, ears, nose, lips, oropharynx, and oral mucosa, anicteric.  Neck:  No masses, no asymmetry.  Lymph Nodes:  No lymphadenopathy.   Cardiovascular:  RRR normal S1/S2, no murmur.  Respiratory:  CTA B/L, normal respiratory effort.   Abdominal:   soft, no masses or tenderness, normoactive BS, NT/ND, no HSM.      Lab Results:                        8.9    14.08 )-----------( 158      ( 23 Feb 2023 05:20 )             26.5     02-23    141  |  111<H>  |  33<H>  ----------------------------<  119<H>  4.0   |  22  |  1.19    Ca    8.9      23 Feb 2023 05:20        PT/INR - ( 22 Feb 2023 06:33 )   PT: 11.3 sec;   INR: 0.97 ratio         PTT - ( 22 Feb 2023 06:33 )  PTT:24.9 sec      Stool Results:          RADIOLOGY RESULTS:    SURGICAL PATHOLOGY:

## 2023-02-23 NOTE — PROGRESS NOTE ADULT - SUBJECTIVE AND OBJECTIVE BOX
HPI:  88M w/PMH BPH, Knee pain, Parkinson's disease w/ dementia, HTN, AVR, presents BIB wife from home c/o FTT, decreased po intake over past several days and not walking much d/t knee pain, feet pain.  Pt is AAOx2 (PP), hx obtained from wife at bedside.  She endorses that pt normally ambulatory but over past several days, he's been more WC and Bedbound.  Wife also endorses that pt not eating as he c/o fear of food will go up his nose and has been having sensation of diaphoresis at night, although he's not sweating.  She's started giving pt "edibles" hoping that it will help calm him down but not much relief from pain.  She denies any notable fever, n/v/d, abd pain, sob, cough.  Pt can confirm that he doesn't want to eat out of fear of getting food in the back of his nose and tells me that his Rt knee and bottom of his feet hurt.  In ED, wbc 10, UA neg, cxr neg, Na 134, Cr 1.6 (prior 1.1), HR 90s, given IVF, morphine x1.     Subjective: Pt seen and evaluated by bedside. Pt tried to pull out the alford. confused. ambulated few feet with assistance.     REVIEW OF SYSTEMS:  unable to assess due to dementia     =======================================================  Vitals:  T(F): 98.8 (23 Feb 2023 15:11), Max: 99.1 (22 Feb 2023 17:50)  HR: 107 (23 Feb 2023 15:11)  BP: 111/79 (23 Feb 2023 15:11)  RR: 18 (23 Feb 2023 15:11)  SpO2: 96% (23 Feb 2023 15:11) (93% - 98%)  temp max in last 48H T(F): , Max: 99.1 (02-22-23 @ 17:50)      =======================================================    PHYSICAL EXAM:  Constitutional: Pt lying in bed, awake and alert, NAD  HEENT: EOMI, normal hearing, dry mucous membranes  Neck: Soft and supple, no JVD  Respiratory: CTABL, No wheezing, rales or rhonchi  Cardiovascular: S1S2+, RRR, no M/G/R  Gastrointestinal: BS+, soft, NT/ND, no guarding, no rebound  : alford with dark reddish urine, (likely from alford trauma)  Extremities: calf swollen b/l  and tender to palpation, b/l knee swelling and pain   Vascular: 2+ peripheral pulses  Neurological: AAOx2, no focal deficits  Musculoskeletal: 5/5 strength b/l upper and lower extremities  Skin: No rashes    =======================================================  Current Antibiotics:    Other medications:  amLODIPine   Tablet 5 milliGRAM(s) Oral daily  atorvastatin 10 milliGRAM(s) Oral at bedtime  escitalopram 20 milliGRAM(s) Oral daily  finasteride 5 milliGRAM(s) Oral daily  hydrALAZINE 10 milliGRAM(s) Oral two times a day  lidocaine   4% Patch 1 Patch Transdermal daily  naloxone Injectable 0.4 milliGRAM(s) IV Push once  pantoprazole    Tablet 40 milliGRAM(s) Oral two times a day  polyethylene glycol 3350 17 Gram(s) Oral daily  QUEtiapine 12.5 milliGRAM(s) Oral <User Schedule>  QUEtiapine 12.5 milliGRAM(s) Oral at bedtime  senna 2 Tablet(s) Oral at bedtime  tamsulosin 0.8 milliGRAM(s) Oral at bedtime      =======================================================  Labs:                        8.8    15.92 )-----------( 161      ( 23 Feb 2023 10:13 )             26.4     02-23    141  |  111<H>  |  33<H>  ----------------------------<  119<H>  4.0   |  22  |  1.19    Ca    8.9      23 Feb 2023 05:20        Culture - Urine (collected 02-20-23 @ 12:13)  Source: Clean Catch Clean Catch (Midstream)  Final Report (02-21-23 @ 14:59):    No growth        SARS-CoV-2 Result: NotDetec (02-20-23 @ 15:04)      02-22-23 @ 07:01  -  02-23-23 @ 07:00  --------------------------------------------------------  IN: 0 mL / OUT: 1275 mL / NET: -1275 mL        ========================================================  Imaging: no new imaging

## 2023-02-23 NOTE — CONSULT NOTE ADULT - ASSESSMENT
87 yo male admitted with FTT. Urology consulted for pt with urinary retention, PVRs ranging from 300-500 mL requiring straight cath and subsequent indwelling alford placement. As per RN pt with some hematuria after alford placement. UCx neg.   Urine red tinged likely due to alford trauma   Recommend  - Continue Flomax and Finasteride  - Keep indwelling alford in place  - Trial of void in 1 week inpatient vs. outpatient. If inpatient TOV is performed check post void residual, if significant d/c with alford to leg bag  - Pt can follow up with Urologists in [place] (Dr. Chery/Chavez/Delvis) or Follow up with Dr. Randy Wilson (located in [facility] [phone]) for further management    Case discussed with Dr. Wilson 87 yo male admitted with FTT. Urology consulted for pt with urinary retention, PVRs ranging from 300-500 mL requiring straight cath and subsequent indwelling alford placement. As per RN pt with some hematuria after alford placement. UCx neg.   Urine red tinged likely due to alford trauma   Recommend  - Continue Flomax and Finasteride  - Keep indwelling alford in place  - Trial of void in 1 week outpatient d/c with alford to leg bag  - Pt can follow up with Urologists in [place] (Dr. Chery/Chavez/Delvis) or Follow up with Dr. Randy Wilson (located in [facility] [phone]) for further management    Case discussed with Dr. Wilson

## 2023-02-23 NOTE — PROGRESS NOTE ADULT - ASSESSMENT
88M w/PMH BPH, Knee pain, Parkinson's disease w/ dementia, HTN, AVR, presents BIB wife from home c/o FTT, decreased po intake over past several days and not walking much d/t knee pain, feet pain.  Pt is AAOx2 (PP), fears food will end up in back of his nose if he eats, In ED, wbc 10, UA neg, cxr neg, Na 134, Cr 1.6 (prior 1.1), HR 90s, given IVF, morphine x1.     #acute GI bleed developed after the admission   -Hgb 11.9 -> 7.8 -> 6.3 within ~24hrs  -hx of celebrex use   -s/p 2 units pRBC  -transfuse if Hgb <7  -serial h/h   -Cardio rec appreciated: no cardiac contraindication for EGD   -s/p EGD showing no further bleeding   -GI rec appreciated      - No Nsaids    - Advance to Regular diet    - PPI PO BID    - Office visit next week    #urinary retention  -alford with hematuria 2/2 alford trauma   -urology rec appreciated     - Continue Flomax and Finasteride     - Keep indwelling alford in place     - Trial of void in 1 week outpatient d/c with alford to leg bag     - Pt can follow up with Urologists in [place] (Dr. Chery/Chavez/Delvis) or Follow up with Dr. Randy Wilson (located in [facility] [phone]) for further management    #FTT w/ poor po intake in setting of delirium  - treat underlying cause  - nutrition cs  - supportive care  - aspiration precautions    #Delirium w/ dementia- no e/o infection, possibly d/t dehydration, progression of dementia  #Parkinson's dementia  - psych rec appreciated:     - escitalopram 20mg qd      - start seroquel 12.5mg qd     - start seroquel 12.5mg qhs      - melatonin 3mg qhs      - ondansetron 4mg IV 8hrs PRN for n/v  - outpt f/u w/ his neurologist for eval of PD, dementia  - possibly related to recent edibles- d/w wife    #Chronic Rt knee pain w/ increased swelling of RLE  - RLE doppler with no evidence of dvt   - c/w lido patch, iv dilaudid, ice to knee  - f/u PT eval   - hold tizandine, celebrex home doses    #Feet pain- possibly peripheral neuropathy  - b12, folate nl     #HTN- bp stable  - c/w pt's dose of hydralazine, amlodipine    #MAYRA 2/2 likely dehydration  #hyponatremia, mild  - resolved   - s/p NS @80cc/hr    #PPX- hold lovenox for now as pt with hematuria     Dispo: pending home service logistics     Discussed with Dr. Henley

## 2023-02-24 LAB
HCT VFR BLD CALC: 23 % — LOW (ref 39–50)
HCT VFR BLD CALC: 23.1 % — LOW (ref 39–50)
HGB BLD-MCNC: 7.5 G/DL — LOW (ref 13–17)
HGB BLD-MCNC: 7.6 G/DL — LOW (ref 13–17)
MCHC RBC-ENTMCNC: 30.5 PG — SIGNIFICANT CHANGE UP (ref 27–34)
MCHC RBC-ENTMCNC: 32.6 GM/DL — SIGNIFICANT CHANGE UP (ref 32–36)
MCV RBC AUTO: 93.5 FL — SIGNIFICANT CHANGE UP (ref 80–100)
PLATELET # BLD AUTO: 155 K/UL — SIGNIFICANT CHANGE UP (ref 150–400)
RBC # BLD: 2.46 M/UL — LOW (ref 4.2–5.8)
RBC # FLD: 13 % — SIGNIFICANT CHANGE UP (ref 10.3–14.5)
WBC # BLD: 10.78 K/UL — HIGH (ref 3.8–10.5)
WBC # FLD AUTO: 10.78 K/UL — HIGH (ref 3.8–10.5)

## 2023-02-24 PROCEDURE — 99223 1ST HOSP IP/OBS HIGH 75: CPT

## 2023-02-24 PROCEDURE — 99232 SBSQ HOSP IP/OBS MODERATE 35: CPT | Mod: GC

## 2023-02-24 PROCEDURE — 93010 ELECTROCARDIOGRAM REPORT: CPT

## 2023-02-24 PROCEDURE — 99231 SBSQ HOSP IP/OBS SF/LOW 25: CPT

## 2023-02-24 RX ORDER — HYDROMORPHONE HYDROCHLORIDE 2 MG/ML
4 INJECTION INTRAMUSCULAR; INTRAVENOUS; SUBCUTANEOUS EVERY 4 HOURS
Refills: 0 | Status: DISCONTINUED | OUTPATIENT
Start: 2023-02-24 | End: 2023-02-27

## 2023-02-24 RX ORDER — HYDROMORPHONE HYDROCHLORIDE 2 MG/ML
2 INJECTION INTRAMUSCULAR; INTRAVENOUS; SUBCUTANEOUS EVERY 4 HOURS
Refills: 0 | Status: DISCONTINUED | OUTPATIENT
Start: 2023-02-24 | End: 2023-02-27

## 2023-02-24 RX ORDER — QUETIAPINE FUMARATE 200 MG/1
50 TABLET, FILM COATED ORAL
Refills: 0 | Status: DISCONTINUED | OUTPATIENT
Start: 2023-02-24 | End: 2023-02-24

## 2023-02-24 RX ORDER — QUETIAPINE FUMARATE 200 MG/1
25 TABLET, FILM COATED ORAL AT BEDTIME
Refills: 0 | Status: DISCONTINUED | OUTPATIENT
Start: 2023-02-24 | End: 2023-02-27

## 2023-02-24 RX ORDER — QUETIAPINE FUMARATE 200 MG/1
25 TABLET, FILM COATED ORAL
Refills: 0 | Status: DISCONTINUED | OUTPATIENT
Start: 2023-02-24 | End: 2023-02-27

## 2023-02-24 RX ORDER — QUETIAPINE FUMARATE 200 MG/1
25 TABLET, FILM COATED ORAL
Refills: 0 | Status: DISCONTINUED | OUTPATIENT
Start: 2023-02-24 | End: 2023-02-28

## 2023-02-24 RX ADMIN — HYDROMORPHONE HYDROCHLORIDE 4 MILLIGRAM(S): 2 INJECTION INTRAMUSCULAR; INTRAVENOUS; SUBCUTANEOUS at 18:13

## 2023-02-24 RX ADMIN — TAMSULOSIN HYDROCHLORIDE 0.8 MILLIGRAM(S): 0.4 CAPSULE ORAL at 21:04

## 2023-02-24 RX ADMIN — AMLODIPINE BESYLATE 5 MILLIGRAM(S): 2.5 TABLET ORAL at 06:33

## 2023-02-24 RX ADMIN — ESCITALOPRAM OXALATE 20 MILLIGRAM(S): 10 TABLET, FILM COATED ORAL at 09:29

## 2023-02-24 RX ADMIN — Medication 3 MILLIGRAM(S): at 21:04

## 2023-02-24 RX ADMIN — HYDROMORPHONE HYDROCHLORIDE 0.5 MILLIGRAM(S): 2 INJECTION INTRAMUSCULAR; INTRAVENOUS; SUBCUTANEOUS at 14:52

## 2023-02-24 RX ADMIN — SENNA PLUS 2 TABLET(S): 8.6 TABLET ORAL at 21:05

## 2023-02-24 RX ADMIN — HYDROMORPHONE HYDROCHLORIDE 0.5 MILLIGRAM(S): 2 INJECTION INTRAMUSCULAR; INTRAVENOUS; SUBCUTANEOUS at 15:16

## 2023-02-24 RX ADMIN — FINASTERIDE 5 MILLIGRAM(S): 5 TABLET, FILM COATED ORAL at 09:29

## 2023-02-24 RX ADMIN — PANTOPRAZOLE SODIUM 40 MILLIGRAM(S): 20 TABLET, DELAYED RELEASE ORAL at 18:12

## 2023-02-24 RX ADMIN — HYDROMORPHONE HYDROCHLORIDE 4 MILLIGRAM(S): 2 INJECTION INTRAMUSCULAR; INTRAVENOUS; SUBCUTANEOUS at 18:45

## 2023-02-24 RX ADMIN — ATORVASTATIN CALCIUM 10 MILLIGRAM(S): 80 TABLET, FILM COATED ORAL at 21:04

## 2023-02-24 RX ADMIN — Medication 10 MILLIGRAM(S): at 06:33

## 2023-02-24 RX ADMIN — HYDROMORPHONE HYDROCHLORIDE 0.5 MILLIGRAM(S): 2 INJECTION INTRAMUSCULAR; INTRAVENOUS; SUBCUTANEOUS at 09:29

## 2023-02-24 RX ADMIN — PANTOPRAZOLE SODIUM 40 MILLIGRAM(S): 20 TABLET, DELAYED RELEASE ORAL at 06:33

## 2023-02-24 RX ADMIN — POLYETHYLENE GLYCOL 3350 17 GRAM(S): 17 POWDER, FOR SOLUTION ORAL at 09:29

## 2023-02-24 RX ADMIN — HYDROMORPHONE HYDROCHLORIDE 0.25 MILLIGRAM(S): 2 INJECTION INTRAMUSCULAR; INTRAVENOUS; SUBCUTANEOUS at 11:31

## 2023-02-24 RX ADMIN — QUETIAPINE FUMARATE 25 MILLIGRAM(S): 200 TABLET, FILM COATED ORAL at 21:04

## 2023-02-24 RX ADMIN — HYDROMORPHONE HYDROCHLORIDE 0.5 MILLIGRAM(S): 2 INJECTION INTRAMUSCULAR; INTRAVENOUS; SUBCUTANEOUS at 09:50

## 2023-02-24 RX ADMIN — HYDROMORPHONE HYDROCHLORIDE 0.25 MILLIGRAM(S): 2 INJECTION INTRAMUSCULAR; INTRAVENOUS; SUBCUTANEOUS at 11:11

## 2023-02-24 RX ADMIN — QUETIAPINE FUMARATE 25 MILLIGRAM(S): 200 TABLET, FILM COATED ORAL at 16:03

## 2023-02-24 NOTE — PROGRESS NOTE ADULT - SUBJECTIVE AND OBJECTIVE BOX
Pt has been seen and examined with FP resident, resident supervised agree with a/p       Patient is a 88y old  Male who presents with a chief complaint of FTT, pain in RLE (21 Feb 2023 19:58)      HPI:  HPI:  88M w/PMH BPH, Knee pain, Parkinson's disease w/ dementia, HTN, AVR, presents BIB wife from home c/o FTT, decreased po intake over past several days and not walking much d/t knee pain, feet pain.          PHYSICAL EXAM:      -rs-aeeb, cta  -cvs-s1s2 normal   -p/a-soft,bs+      A/P    # GI Bleed-most likely was  present on admission  -ct ppi     #Hematuria with drop in h/h- monitor it, urology evaluation     #dvt pr    #d/c plan

## 2023-02-24 NOTE — PROGRESS NOTE ADULT - SUBJECTIVE AND OBJECTIVE BOX
HPI:  88M w/PMH BPH, Knee pain, Parkinson's disease w/ dementia, HTN, AVR, presents BIB wife from home c/o FTT, decreased po intake over past several days and not walking much d/t knee pain, feet pain.  Pt is AAOx2 (PP), hx obtained from wife at bedside.  She endorses that pt normally ambulatory but over past several days, he's been more WC and Bedbound.  Wife also endorses that pt not eating as he c/o fear of food will go up his nose and has been having sensation of diaphoresis at night, although he's not sweating.  She's started giving pt "edibles" hoping that it will help calm him down but not much relief from pain.  She denies any notable fever, n/v/d, abd pain, sob, cough.  Pt can confirm that he doesn't want to eat out of fear of getting food in the back of his nose and tells me that his Rt knee and bottom of his feet hurt.  In ED, wbc 10, UA neg, cxr neg, Na 134, Cr 1.6 (prior 1.1), HR 90s, given IVF, morphine x1.     Subjective: Pt seen and evaluated by bedside. Alford draining dark red urine. Pt's family wants hospice care.     REVIEW OF SYSTEMS:  unable to assess due to dementia       =======================================================  Vitals:  T(F): 98.1 (24 Feb 2023 07:52), Max: 98.1 (24 Feb 2023 07:52)  HR: 91 (24 Feb 2023 07:52)  BP: 106/49 (24 Feb 2023 07:52)  RR: 17 (24 Feb 2023 07:52)  SpO2: 99% (24 Feb 2023 07:52) (99% - 99%)  temp max in last 48H T(F): , Max: 99.1 (02-22-23 @ 17:50)      =======================================================    PHYSICAL EXAM:  Constitutional: Pt lying in bed, awake and alert, NAD  HEENT: EOMI, normal hearing, dry mucous membranes  Neck: Soft and supple, no JVD  Respiratory: CTABL, No wheezing, rales or rhonchi  Cardiovascular: S1S2+, RRR, no M/G/R  Gastrointestinal: BS+, soft, NT/ND, no guarding, no rebound  : alford with dark reddish urine, (likely from alford trauma)  Extremities: calf swollen b/l  and tender to palpation, b/l knee swelling and pain   Vascular: 2+ peripheral pulses  Neurological: AAOx2, no focal deficits  Musculoskeletal: 5/5 strength b/l upper and lower extremities  Skin: No rashes    =======================================================  Current Antibiotics:    Other medications:  amLODIPine   Tablet 5 milliGRAM(s) Oral daily  atorvastatin 10 milliGRAM(s) Oral at bedtime  escitalopram 20 milliGRAM(s) Oral daily  finasteride 5 milliGRAM(s) Oral daily  hydrALAZINE 10 milliGRAM(s) Oral two times a day  lidocaine   4% Patch 1 Patch Transdermal daily  naloxone Injectable 0.4 milliGRAM(s) IV Push once  pantoprazole    Tablet 40 milliGRAM(s) Oral two times a day  polyethylene glycol 3350 17 Gram(s) Oral daily  QUEtiapine 25 milliGRAM(s) Oral at bedtime  QUEtiapine 25 milliGRAM(s) Oral <User Schedule>  senna 2 Tablet(s) Oral at bedtime  tamsulosin 0.8 milliGRAM(s) Oral at bedtime      =======================================================  Labs:                        7.6    x     )-----------( x        ( 24 Feb 2023 12:29 )             23.1     02-23    141  |  111<H>  |  33<H>  ----------------------------<  119<H>  4.0   |  22  |  1.19    Ca    8.9      23 Feb 2023 05:20        Culture - Urine (collected 02-20-23 @ 12:13)  Source: Clean Catch Clean Catch (Midstream)  Final Report (02-21-23 @ 14:59):    No growth        SARS-CoV-2 Result: NotDetec (02-20-23 @ 15:04)      02-23-23 @ 07:01  -  02-24-23 @ 07:00  --------------------------------------------------------  IN: 0 mL / OUT: 1000 mL / NET: -1000 mL        ========================================================  Imaging: no new imaging

## 2023-02-24 NOTE — CONSULT NOTE ADULT - ASSESSMENT
HPI: Pt is a 88y old Male with a PMH of  BPH, Knee pain, Parkinson's disease w/ dementia, HTN, AVR, presents BIB wife from home c/o FTT, decreased po intake over past several days and not walking much d/t knee pain, feet pain.  Pt is AAOx2 (PP), Wife endorses that pt normally ambulatory but over past several days, he's been more WC and Bedbound.  Wife also endorses that pt not eating as he c/o fear of food will go up his nose and has been having sensation of diaphoresis at night, although he's not sweating.  She's started giving pt "edibles" hoping that it will help calm him down but not much relief from pain.  She denies any notable fever, n/v/d, abd pain, sob, cough.  Pt can confirm that he doesn't want to eat out of fear of getting food in the back of his nose and states that his Rt knee and bottom of his feet hurt. . Palliative medicine  Consult to further establish GOC     1/24/23 Seen and examined at bedside. Attempting to get OOB with PT at the time of my visit. Requires max assistance and he states he can not stand and has pain RLE. Seated in chair. Wife at bedside       Assessment and Plan:    1) GI bleed   -developed after the admission   -Hgb 11.9 -> 7.8 -> 6.3 within ~24hrs  -hx of celebrex use   -s/p 2 units PRBC  -transfuse as per medicin  -serial h/h   -Cardio rec appreciated: no cardiac contraindication for EGD   -s/p EGD showing no further bleeding   -GI rec appreciated   -Avoids NSAIDS  -Diet as tolerated  -PPI as ordered       2) Urinary retention  -Sherman in place  -Urology eval noted  -Follow up out pt    3) Debility  -Dementia  -Poor PO intake  -Poor mobility  -PT eval  -Pain    4) Dementia  - behavioral disturbance  -Cont Seroquel  -Pain management  -Hydromorphone  Injectable 0.25 mg IV Push every 4 hours PRN Moderate Pain (4 - 6)  -Hydromorphone  Injectable 0.5 mg IV Push every 4 hours PRN Severe Pain (7 - 10)  -Add Dilaudid 2 mg PO Q4H PRN mod pain if tolerating PO  -Add Dilaudid 4 mg PO Q4H PRN severe pain if tolerating PO  - supportive care  - aspiration precautions     5) Advanced Directives  -Pt without capacity  -Wife Kalani surrogate  -Will schedule GOC discussion

## 2023-02-24 NOTE — PROGRESS NOTE ADULT - ASSESSMENT
88M w/PMH BPH, Knee pain, Parkinson's disease w/ dementia, HTN, AVR, presents BIB wife from home c/o FTT, decreased po intake over past several days and not walking much d/t knee pain, feet pain.  Pt is AAOx2 (PP), fears food will end up in back of his nose if he eats, In ED, wbc 10, UA neg, cxr neg, Na 134, Cr 1.6 (prior 1.1), HR 90s, given IVF, morphine x1. Pt transitioning to hospice care.     #acute GI bleed developed after the admission   -Hgb 11.9 -> 7.8 -> 6.3 within ~24hrs  -hx of celebrex use   -s/p 2 units pRBC  -transfuse if Hgb <7  -serial h/h   -Cardio rec appreciated: no cardiac contraindication for EGD   -s/p EGD showing no further bleeding   -GI rec appreciated      - No Nsaids    - Advance to Regular diet    - PPI PO BID    - Office visit next week    #urinary retention  -alford with hematuria 2/2 alford trauma   -urology rec appreciated     - Continue Flomax and Finasteride     - Keep indwelling alford in place     - Trial of void in 1 week outpatient d/c with alford to leg bag     - Pt can follow up with Urologists in [place] (Dr. Chery/Chavez/Delvis) or Follow up with Dr. Randy Wilson (located in [facility] [phone]) for further management    #FTT w/ poor po intake in setting of delirium  - treat underlying cause  - nutrition cs  - supportive care  - aspiration precautions    #Delirium w/ dementia- no e/o infection, possibly d/t dehydration, progression of dementia  #Parkinson's dementia  - psych rec appreciated:     - escitalopram 20mg qd      - start seroquel 12.5mg qd     - start seroquel 12.5mg qhs      - melatonin 3mg qhs      - ondansetron 4mg IV 8hrs PRN for n/v  - outpt f/u w/ his neurologist for eval of PD, dementia  - possibly related to recent edibles- d/w wife    #Chronic Rt knee pain w/ increased swelling of RLE  - RLE doppler with no evidence of dvt   - c/w lido patch, iv dilaudid, ice to knee  - f/u PT eval   - hold tizandine, celebrex home doses    #Feet pain- possibly peripheral neuropathy  - b12, folate nl     #HTN- bp stable  - c/w pt's dose of hydralazine, amlodipine    #MAYRA 2/2 likely dehydration  #hyponatremia, mild  - resolved   - s/p NS @80cc/hr    #PPX- hold lovenox for now as pt with hematuria     Dispo: pending home service logistics     Discussed with Dr. Henley  88M w/PMH BPH, Knee pain, Parkinson's disease w/ dementia, HTN, AVR, presents BIB wife from home c/o FTT, decreased po intake over past several days and not walking much d/t knee pain, feet pain.  Pt is AAOx2 (PP), fears food will end up in back of his nose if he eats, In ED, wbc 10, UA neg, cxr neg, Na 134, Cr 1.6 (prior 1.1), HR 90s, given IVF, morphine x1. Pt transitioning to hospice care.     #acute GI bleed developed after the admission   -Hgb 11.9 -> 7.8 -> 6.3 within ~24hrs  -hx of celebrex use   -s/p 2 units pRBC  -transfuse if Hgb <7  -serial h/h   -Cardio rec appreciated: no cardiac contraindication for EGD   -s/p EGD showing no further bleeding   -GI rec appreciated      - No Nsaids    - Advance to Regular diet    - PPI PO BID    - Office visit next week    #urinary retention  -alford with hematuria 2/2 alford trauma   -urology rec appreciated     - Continue Flomax and Finasteride     - Keep indwelling alford in place     - Trial of void today, if unsuccessful pt to do trial of void in 1 week outpatient and d/c with alford to leg bag     - Pt can follow up with Urologists in [place] (Dr. Chery/Chavez/Delvis) or Follow up with Dr. Randy Wilson (located in [facility] [phone]) for further management    #FTT w/ poor po intake in setting of delirium  - treat underlying cause  - nutrition cs  - supportive care  - aspiration precautions    #Delirium w/ dementia- no e/o infection, possibly d/t dehydration, progression of dementia  #Parkinson's dementia  - psych rec appreciated:     - escitalopram 20mg qd      - start seroquel 12.5mg qd     - start seroquel 12.5mg qhs      - melatonin 3mg qhs      - ondansetron 4mg IV 8hrs PRN for n/v  - outpt f/u w/ his neurologist for eval of PD, dementia  - possibly related to recent edibles- d/w wife    #Chronic Rt knee pain w/ increased swelling of RLE  - RLE doppler with no evidence of dvt   - c/w lido patch, iv dilaudid, ice to knee  - f/u PT eval   - hold tizandine, celebrex home doses  -pt now on hospice care:       -Dilaudid 2 mg PO Q4H PRN mod pain if tolerating PO     -Dilaudid 4 mg PO Q4H PRN severe pain if tolerating PO     -can switch back to IV if pain uncontrolled with PO     #Feet pain- possibly peripheral neuropathy  - b12, folate nl     #HTN- bp stable  - c/w pt's dose of hydralazine, amlodipine    #MAYRA 2/2 likely dehydration  #hyponatremia, mild  - resolved   - s/p NS @80cc/hr    #PPX- hold lovenox as pt now hospice care     Dispo:   -pt transitioning into hospice care  -palliative following    Discussed with Dr. Henley  88M w/PMH BPH, Knee pain, Parkinson's disease w/ dementia, HTN, AVR, presents BIB wife from home c/o FTT, decreased po intake over past several days and not walking much d/t knee pain, feet pain.  Pt is AAOx2 (PP), fears food will end up in back of his nose if he eats, In ED, wbc 10, UA neg, cxr neg, Na 134, Cr 1.6 (prior 1.1), HR 90s, given IVF, morphine x1. Pt transitioning to hospice care.     #acute GI bleed developed after the admission   -Hgb 11.9 -> 7.8 -> 6.3 within ~24hrs  -hx of celebrex use   -s/p 2 units pRBC  -transfuse if Hgb <7  -serial h/h   -Cardio rec appreciated: no cardiac contraindication for EGD   -s/p EGD showing no further bleeding   -GI rec appreciated      - No Nsaids    - Advance to Regular diet    - PPI PO BID    - Office visit next week    #urinary retention  -alford with hematuria 2/2 alford trauma   -urology rec appreciated     - Continue Flomax and Finasteride     - Keep indwelling alford in place     - Trial of void today, if unsuccessful pt to do trial of void in 1 week outpatient and d/c with alford to leg bag     - Pt can follow up with Urologists in [place] (Dr. Chery/Chavez/Delvis) or Follow up with Dr. Randy Wilson (located in [facility] [phone]) for further management    #FTT w/ poor po intake in setting of delirium  - treat underlying cause  - nutrition cs  - supportive care  - aspiration precautions    #Delirium w/ dementia- no e/o infection, possibly d/t dehydration, progression of dementia  #Parkinson's dementia  - psych rec appreciated:     - escitalopram 20mg qd      - increased seroquel to 25mg qd     - increased seroquel to 25mg qhs      - melatonin 3mg qhs      - ondansetron 4mg IV 8hrs PRN for n/v  - outpt f/u w/ his neurologist for eval of PD, dementia  - possibly related to recent edibles- d/w wife    #Chronic Rt knee pain w/ increased swelling of RLE  - RLE doppler with no evidence of dvt   - c/w lido patch, iv dilaudid, ice to knee  - f/u PT eval   - hold tizandine, celebrex home doses  -pt now on hospice care:       -Dilaudid 2 mg PO Q4H PRN mod pain if tolerating PO     -Dilaudid 4 mg PO Q4H PRN severe pain if tolerating PO     -can switch back to IV if pain uncontrolled with PO     #Feet pain- possibly peripheral neuropathy  - b12, folate nl     #HTN- bp stable  - c/w pt's dose of hydralazine, amlodipine    #MAYRA 2/2 likely dehydration  #hyponatremia, mild  - resolved   - s/p NS @80cc/hr    #PPX- hold lovenox as pt now hospice care     Dispo:   -pt transitioning into hospice care  -palliative following    Discussed with Dr. Henley

## 2023-02-24 NOTE — GOALS OF CARE CONVERSATION - ADVANCED CARE PLANNING - CONVERSATION DETAILS
HPI:  88M w/PMH BPH, Knee pain, Parkinson's disease w/ dementia, HTN, AVR, presents BIB wife from home c/o FTT, decreased po intake over past several days and not walking much d/t knee pain, feet pain.      (20 Feb 2023 17:16)      PERTINENT PMH REVIEWED:  [ X ] YES [ ] NO           Mental Status: [ ] Alert  [  ] Oriented [  ] Confused [  X ] Lethargic [  ]  Concerns of Depression [  ]- not identified   Anxiety [   ]- not identified   Baseline ADLs (prior to admission):  Independent [ ] moderately [ ] fully   Dependent   [ X ] moderately [ ] fully    Anticipated Grief: Patient [  ] Family [ X  ]    Caregiver Holcomb Assessed: Yes [ X ] No [  ]    Alevism: Druze     Spiritual Concerns: Not identified     Goals of Care: Comfort [ X ] Rehabilitation [  ] Curative [  ] Life Prolonging [  ]    Previous Services: Aide through [employer] insurance- will be increasing to 7 days     ADVANCE DIRECTIVES: MOLST Completed: DNR/DNI, Comfort Measures, Do not send to hospital, No IV Fluids, No feeding tube    Anticipated D/C Plan: Referral to [facility] for home hospice                      Summary:    This SW met with pts family to discuss goals of care, assist with planning and provide supportive counseling. Pt. lives with his wife and had a private aide through [employer] insurance during the week. Pts wife discussed how pt. has declined further and his pain has been an issue. She shared that they have now increased the aides to be 7 days a week when he returns home.    Pts wife shared that at this point they do not want pt. to suffer and just want him to be in his own home and comfortable. Feelings explored and support provided. We discussed the option of hospice at home. Hospice services and philosophy of care explained in detail. Inpatient hospice was also discussed in the event pts pain could not be managed with PO medication. For now, the goal is to see if pt. can tolerate PO for pain management and if he can home hospice would be preferred. They would like a referral sent to [facility] for home hospice.     MOLST was reviewed in detail and completed. MOLST states DNR/DNI, Comfort Measures, Do not send to hospital, No IV Fluids, No feeding tube.    Plan at this time is for home hospice referral to [facility] however, if over the weekend pts pain cannot be managed to PO we will revisit and consider inpatient hospice. Emotional support provided. Our team will continue to follow.

## 2023-02-24 NOTE — CONSULT NOTE ADULT - SUBJECTIVE AND OBJECTIVE BOX
HPI: Pt is a 88y old Male with a PMH of  BPH, Knee pain, Parkinson's disease w/ dementia, HTN, AVR, presents BIB wife from home c/o FTT, decreased po intake over past several days and not walking much d/t knee pain, feet pain.  Pt is AAOx2 (PP), Wife endorses that pt normally ambulatory but over past several days, he's been more WC and Bedbound.  Wife also endorses that pt not eating as he c/o fear of food will go up his nose and has been having sensation of diaphoresis at night, although he's not sweating.  She's started giving pt "edibles" hoping that it will help calm him down but not much relief from pain.  She denies any notable fever, n/v/d, abd pain, sob, cough.  Pt can confirm that he doesn't want to eat out of fear of getting food in the back of his nose and states that his Rt knee and bottom of his feet hurt. . Palliative medicine  Consult to further establish GOC     1/24/23 Seen and examined at bedside. Attempting to get OOB with PT at the time of my visit. Requires max assistance and he states he can not stand and has pain RLE. Seated in chair. Wife at bedside       PAIN: (X )Yes   ( )No  Level: mod-severe  Location: Right lower ext  Intensity:   unable to quantify  Impact on ADLs: severe    DYSPNEA: ( ) Yes  ( X) No  Level:    PAST MEDICAL & SURGICAL HISTORY:  Hypertension  BPH (benign prostatic hyperplasia)  High cholesterol  Chronic pain  s/p knee and hip replacements  Mild CAD  Non Obstructive CAD  S/P AVR (aortic valve replacement)  Anemia  Dementia  History of total right knee replacement (TKR)  X2  History of total hip replacement, right  S/P AVR (aortic valve replacement)    SOCIAL HX:  Lives with wife and aide support  Hx opiate tolerance ( )YES  (X )NO    Baseline ADLs  (Prior to Admission)  ( ) Independent   (X )Dependent    FAMILY HISTORY:  Unable to obtain due to dementia    Review of Systems:    Unable to obtain/Limited due to: dementia      PHYSICAL EXAM:    Vital Signs Last 24 Hrs  T(C): 36.7 (24 Feb 2023 07:52), Max: 37.1 (23 Feb 2023 15:11)  T(F): 98.1 (24 Feb 2023 07:52), Max: 98.8 (23 Feb 2023 15:11)  HR: 91 (24 Feb 2023 07:52) (91 - 113)  BP: 106/49 (24 Feb 2023 07:52) (100/79 - 140/75)  BP(mean): 95 (24 Feb 2023 06:32) (95 - 95)  RR: 17 (24 Feb 2023 07:52) (17 - 18)  SpO2: 99% (24 Feb 2023 07:52) (96% - 99%)    Parameters below as of 24 Feb 2023 07:52  Patient On (Oxygen Delivery Method): room air    PPSV2: 40 %  FAST: 7    General: Frail elderly male in bed in no acute distress  Mental Status: A&O X2  HEENT: oral mucosa dry  Lungs: clear diminished javad  Cardiac: S1S2+  GI: abd soft NT ND + BS  : voids  Ext: CLEMENTS=stength  Neuro: dementia      LABS:                        7.6    x     )-----------( x        ( 24 Feb 2023 12:29 )             23.1     02-23    141  |  111<H>  |  33<H>  ----------------------------<  119<H>  4.0   |  22  |  1.19    Ca    8.9      23 Feb 2023 05:20        Albumin: Albumin, Serum: 3.7 g/dL (02-20 @ 12:13)      Allergies    oxycodone (Other; Flushing)  tramadol (Other (Mod to Severe); Flushing)    Intolerances      MEDICATIONS  (STANDING):  amLODIPine   Tablet 5 milliGRAM(s) Oral daily  atorvastatin 10 milliGRAM(s) Oral at bedtime  escitalopram 20 milliGRAM(s) Oral daily  finasteride 5 milliGRAM(s) Oral daily  hydrALAZINE 10 milliGRAM(s) Oral two times a day  lidocaine   4% Patch 1 Patch Transdermal daily  naloxone Injectable 0.4 milliGRAM(s) IV Push once  pantoprazole    Tablet 40 milliGRAM(s) Oral two times a day  polyethylene glycol 3350 17 Gram(s) Oral daily  QUEtiapine 12.5 milliGRAM(s) Oral <User Schedule>  QUEtiapine 12.5 milliGRAM(s) Oral at bedtime  senna 2 Tablet(s) Oral at bedtime  tamsulosin 0.8 milliGRAM(s) Oral at bedtime    MEDICATIONS  (PRN):  acetaminophen     Tablet .. 650 milliGRAM(s) Oral every 6 hours PRN Temp greater or equal to 38C (100.4F), Mild Pain (1 - 3)  aluminum hydroxide/magnesium hydroxide/simethicone Suspension 30 milliLiter(s) Oral every 4 hours PRN Dyspepsia  bisacodyl 5 milliGRAM(s) Oral daily PRN Constipation  HYDROmorphone  Injectable 0.25 milliGRAM(s) IV Push every 4 hours PRN Moderate Pain (4 - 6)  HYDROmorphone  Injectable 0.5 milliGRAM(s) IV Push every 4 hours PRN Severe Pain (7 - 10)  melatonin 3 milliGRAM(s) Oral at bedtime PRN Insomnia  ondansetron Injectable 4 milliGRAM(s) IV Push every 8 hours PRN Nausea and/or Vomiting      RADIOLOGY/ADDITIONAL STUDIES:    < from: Xray Knee 1 or 2 Views, Right (02.21.23 @ 12:51) >    ACC: [id number] EXAM:  XR KNEE 1-2 VIEWS RT   ORDERED BY: RAMONITA TO     PROCEDURE DATE:  02/21/2023          INTERPRETATION:  XR KNEE 1 OR 2 VIEWS RIGHT    Clinical History: Knee pain    AP, lateral and oblique view right knee compared with 12/9/2020      FINDINGS:    Status post total right knee replacement. The hardware is intact without   evidence of loosening. No periprosthetic fracture or dislocation.   Osteopenia. Atherosclerotic change. No large joint effusion.    IMPRESSION: Status post total right knee replacement. Hardware intact. No   evidence of fracture.      
PCP:    REQUESTING PHYSICIAN:    REASON FOR CONSULT:    CHIEF COMPLAINT:    HPI:  HPI:  88M w/PMH BPH, Knee pain, Parkinson's disease w/ dementia, HTN, AVR, presents BIB wife from home c/o FTT, decreased po intake over past several days and not walking much d/t knee pain, feet pain.  Pt is AAOx2 (PP), hx obtained from wife at bedside.  She endorses that pt normally ambulatory but over past several days, he's been more WC and Bedbound.  Wife also endorses that pt not eating as he c/o fear of food will go up his nose and has been having sensation of diaphoresis at night, although he's not sweating.  She's started giving pt "edibles" hoping that it will help calm him down but not much relief from pain.  She denies any notable fever, n/v/d, abd pain, sob, cough.  Pt can confirm that he doesn't want to eat out of fear of getting food in the back of his nose and tells me that his Rt knee and bottom of his feet hurt.    Cardiology called to evaluate pre procedure cardiac status. Pt has a history of PCI x 2 at [facility] 5 years ago. He follows there for annual cardiac follow up. He denies exertional symptoms. He denies history of MI or CHF although PD limits his ambulation.       PAST MEDICAL & SURGICAL HISTORY:  parkinson's disease w/dementia  Hypertension  BPH (benign prostatic hyperplasia)  High cholesterol   s/p knee and hip replacements  Non Obstructive CAD  Anemia  Dementia  History of total right knee replacement (TKR) X2  History of total hip replacement, right  S/P AVR (aortic valve replacement)        Review of Systems:   cannot assess d/t pt's dementia       Social History:   lives w/ wife and requires assistance    FAMILY HISTORY:  cannot assess d/t pt's dementia      MEDICATIONS  (STANDING):  amLODIPine   Tablet 5 milliGRAM(s) Oral daily  atorvastatin 10 milliGRAM(s) Oral at bedtime  enoxaparin Injectable 40 milliGRAM(s) SubCutaneous every 24 hours  escitalopram 20 milliGRAM(s) Oral daily  finasteride 5 milliGRAM(s) Oral daily  hydrALAZINE 10 milliGRAM(s) Oral daily  lidocaine   4% Patch 1 Patch Transdermal daily  naloxone Injectable 0.4 milliGRAM(s) IV Push once  polyethylene glycol 3350 17 Gram(s) Oral daily  senna 2 Tablet(s) Oral at bedtime  sodium chloride 0.9%. 1000 milliLiter(s) (80 mL/Hr) IV Continuous <Continuous>  tamsulosin 0.8 milliGRAM(s) Oral at bedtime    MEDICATIONS  (PRN):  acetaminophen     Tablet .. 650 milliGRAM(s) Oral every 6 hours PRN Temp greater or equal to 38C (100.4F), Mild Pain (1 - 3)  aluminum hydroxide/magnesium hydroxide/simethicone Suspension 30 milliLiter(s) Oral every 4 hours PRN Dyspepsia  bisacodyl 5 milliGRAM(s) Oral daily PRN Constipation  HYDROmorphone  Injectable 0.25 milliGRAM(s) IV Push every 4 hours PRN Moderate Pain (4 - 6)  HYDROmorphone  Injectable 0.5 milliGRAM(s) IV Push every 4 hours PRN Severe Pain (7 - 10)  melatonin 3 milliGRAM(s) Oral at bedtime PRN Insomnia  ondansetron Injectable 4 milliGRAM(s) IV Push every 8 hours PRN Nausea and/or Vomiting      PHYSICAL EXAM:  Vital Signs Last 24 Hrs  T(C): 36.6 (2023 15:26), Max: 37.4 (2023 13:00)  T(F): 97.8 (2023 15:26), Max: 99.3 (2023 13:00)  HR: 86 (2023 15:26) (86 - 102)  BP: 132/72 (2023 15:26) (132/72 - 140/68)  BP(mean): --  RR: 19 (2023 15:26) (15 - 20)  SpO2: 98% (2023 15:26) (98% - 100%)    Parameters below as of 2023 15:26  Patient On (Oxygen Delivery Method): room air      GENERAL: NAD,   HEAD:  Atraumatic, Normocephalic  EYES: EOMI, PERRLA, conjunctiva and sclera clear  ENT: normal hearing, no nasal discharge, throat clear, dentition normal  NECK: Supple, No JVD, no LAD, no thyromegaly   CHEST/LUNG: Clear to auscultation bilaterally; No wheeze, respirations unlabored  HEART: Regular rate and rhythm; No murmurs, rubs, or gallops  ABDOMEN: Soft, Nontender, Nondistended; Bowel sounds present, no HSM  EXTREMITIES:  2+ Peripheral Pulses, No clubbing, cyanosis, ++ RLE calf >LLE in size w/ TTP, dec ROM d/t swelling of knee and pain  PSYCH: AAOx2, normal behavior  NEUROLOGY: non-focal, sensory and cn 2-12 intact, speech/language intact  SKIN: No visible rashes or lesions    LABS:                        11.9   10.72 )-----------( 198      ( 2023 12:13 )             36.0     02-20    134<L>  |  102  |  33<H>  ----------------------------<  111<H>  4.5   |  27  |  1.60<H>    Ca    9.4      2023 12:13  Phos  3.2       Mg     2.0     -    TPro  7.6  /  Alb  3.7  /  TBili  0.5  /  DBili  x   /  AST  15  /  ALT  30  /  AlkPhos  109  -          Urinalysis Basic - ( 2023 12:13 )    Color: Yellow / Appearance: Clear / S.015 / pH: x  Gluc: x / Ketone: Negative  / Bili: Negative / Urobili: Negative   Blood: x / Protein: Negative / Nitrite: Negative   Leuk Esterase: Negative / RBC: x / WBC x   Sq Epi: x / Non Sq Epi: x / Bacteria: x        RADIOLOGY & ADDITIONAL TESTS:    Imaging Personally Reviewed:  cxr neg  EKG Personally Reviewed:  nsr  Consultant(s) Notes Reviewed:      Care Discussed with Consultants/Other Providers:   (2023 17:16)      PAST MEDICAL & SURGICAL HISTORY:  Hypertension      BPH (benign prostatic hyperplasia)      High cholesterol      Chronic pain  s/p knee and hip replacements      Mild CAD  Non Obstructive CAD      S/P AVR (aortic valve replacement)      Anemia      Dementia      History of total right knee replacement (TKR)  X2      History of total hip replacement, right      S/P AVR (aortic valve replacement)          Allergies    oxycodone (Other; Flushing)  tramadol (Other (Mod to Severe); Flushing)    Intolerances        SOCIAL HISTORY:    FAMILY HISTORY:      MEDICATIONS:  MEDICATIONS  (STANDING):  amLODIPine   Tablet 5 milliGRAM(s) Oral daily  atorvastatin 10 milliGRAM(s) Oral at bedtime  escitalopram 20 milliGRAM(s) Oral daily  finasteride 5 milliGRAM(s) Oral daily  hydrALAZINE 10 milliGRAM(s) Oral two times a day  lidocaine   4% Patch 1 Patch Transdermal daily  naloxone Injectable 0.4 milliGRAM(s) IV Push once  pantoprazole  Injectable 40 milliGRAM(s) IV Push two times a day  polyethylene glycol 3350 17 Gram(s) Oral daily  QUEtiapine 12.5 milliGRAM(s) Oral <User Schedule>  QUEtiapine 12.5 milliGRAM(s) Oral at bedtime  senna 2 Tablet(s) Oral at bedtime  tamsulosin 0.8 milliGRAM(s) Oral at bedtime    MEDICATIONS  (PRN):  acetaminophen     Tablet .. 650 milliGRAM(s) Oral every 6 hours PRN Temp greater or equal to 38C (100.4F), Mild Pain (1 - 3)  aluminum hydroxide/magnesium hydroxide/simethicone Suspension 30 milliLiter(s) Oral every 4 hours PRN Dyspepsia  bisacodyl 5 milliGRAM(s) Oral daily PRN Constipation  HYDROmorphone  Injectable 0.25 milliGRAM(s) IV Push every 4 hours PRN Moderate Pain (4 - 6)  HYDROmorphone  Injectable 0.5 milliGRAM(s) IV Push every 4 hours PRN Severe Pain (7 - 10)  melatonin 3 milliGRAM(s) Oral at bedtime PRN Insomnia  ondansetron Injectable 4 milliGRAM(s) IV Push every 8 hours PRN Nausea and/or Vomiting      REVIEW OF SYSTEMS:    CONSTITUTIONAL: Weakness  EYES/ENT: No visual changes;  No vertigo or throat pain   NECK: No pain or stiffness  RESPIRATORY: No cough, wheezing, hemoptysis; No shortness of breath  CARDIOVASCULAR: No chest pain or palpitations  GASTROINTESTINAL: No abdominal or epigastric pain. No nausea, vomiting, or hematemesis; No diarrhea or constipation. No melena or hematochezia.  GENITOURINARY: No dysuria, frequency or hematuria  NEUROLOGICAL: No numbness or weakness  SKIN: No itching, burning, rashes, or lesions   All other review of systems is negative unless indicated above    Vital Signs Last 24 Hrs  T(C): 37.1 (2023 13:00), Max: 37.1 (2023 21:24)  T(F): 98.8 (:), Max: 98.8 (2023 21:24)  HR: 93 (:) (84 - 103)  BP: 136/69 (:) (102/58 - 151/76)  BP(mean): --  RR: 17 (:) (16 - 20)  SpO2: 97% (:) (97% - 99%)    Parameters below as of :00  Patient On (Oxygen Delivery Method): room air        I&O's Summary    2023 07:01  -  2023 07:00  --------------------------------------------------------  IN: 850 mL / OUT: 300 mL / NET: 550 mL    2023 07:01  -  2023 14:53  --------------------------------------------------------  IN: 0 mL / OUT: 475 mL / NET: -475 mL        PHYSICAL EXAM:    Constitutional: NAD, awake and alert, well-developed  HEENT: PERR, EOMI,  No oral cyananosis.  Neck:  supple,  No JVD  Respiratory: Breath sounds are clear bilaterally, No wheezing, rales or rhonchi  Cardiovascular: S1 and S2, regular rate and rhythm, no Murmurs, gallops or rubs  Gastrointestinal: Bowel Sounds present, soft, nontender.   Extremities: No peripheral edema. No clubbing or cyanosis.  Vascular: 2+ peripheral pulses  Neurological: A/O x 3, no focal deficits  Musculoskeletal: no calf tenderness.  Skin: No rashes.      LABS: All Labs Reviewed:                        7.6    7.78  )-----------( 145      ( 2023 06:33 )             23.2                         7.2    6.58  )-----------( 141      ( 2023 02:28 )             22.1                         7.1    7.80  )-----------( 145      ( 2023 22:34 )             21.7     2023 06:33    140    |  111    |  38     ----------------------------<  93     4.3     |  24     |  1.27   2023 06:32    138    |  109    |  36     ----------------------------<  108    4.7     |  24     |  1.28   2023 12:13    134    |  102    |  33     ----------------------------<  111    4.5     |  27     |  1.60     Ca    8.8        2023 06:33  Ca    8.6        2023 06:32  Ca    9.4        2023 12:13  Phos  3.2       2023 12:13  Mg     2.0       2023 12:13    TPro  7.6    /  Alb  3.7    /  TBili  0.5    /  DBili  x      /  AST  15     /  ALT  30     /  AlkPhos  109    2023 12:13    PT/INR - ( 2023 06:33 )   PT: 11.3 sec;   INR: 0.97 ratio         PTT - ( 2023 06:33 )  PTT:24.9 sec      Blood Culture: Organism --  Gram Stain Blood -- Gram Stain --  Specimen Source Clean Catch Clean Catch (Midstream)  Culture-Blood --         @ 12:13  TSH: 2.01      RADIOLOGY/EKG:< from: 12 Lead ECG (23 @ 14:03) >  Diagnosis Line Normal sinus rhythm  Normal ECG  No previous ECGs available  Confirmed by Jerome Gould () on 2023 12:53:13 PM    < end of copied text >      
HPI:  HPI:  88M w/PMH BPH, Knee pain, Parkinson's disease w/ dementia, HTN, AVR, presents BIB wife from home c/o FTT, decreased po intake over past several days and not walking much d/t knee pain, feet pain.  Pt is AAOx2 (PP), hx obtained from wife at bedside.  She endorses that pt normally ambulatory but over past several days, he's been more WC and Bedbound.  Wife also endorses that pt not eating as he c/o fear of food will go up his nose and has been having sensation of diaphoresis at night, although he's not sweating.  She's started giving pt "edibles" hoping that it will help calm him down but not much relief from pain.  She denies any notable fever, n/v/d, abd pain, sob, cough.  Pt can confirm that he doesn't want to eat out of fear of getting food in the back of his nose and tells me that his Rt knee and bottom of his feet hurt.      In ED, wbc 10, UA neg, cxr neg, Na 134, Cr 1.6 (prior 1.1), HR 90s, given IVF, morphine x1.    (20 Feb 2023 17:16)    89 yo male admitted with FTT. Urology consulted for pt with urinary retention, PVRs ranging from 300-500 mL requiring straight cath and subsequent indwelling alford placement. Patient seen at bedside and is noted to be a poor historian due to his dementia. Unable to provide any hx. As per RN pt with some hematuria after alford placement.   PAST MEDICAL & SURGICAL HISTORY:  Hypertension      BPH (benign prostatic hyperplasia)      High cholesterol      Chronic pain  s/p knee and hip replacements      Mild CAD  Non Obstructive CAD      S/P AVR (aortic valve replacement)      Anemia      Dementia      History of total right knee replacement (TKR)  X2      History of total hip replacement, right      S/P AVR (aortic valve replacement)          REVIEW OF SYSTEMS  Pt is a poor historian unable to assess  	     MEDICATIONS  (STANDING):  amLODIPine   Tablet 5 milliGRAM(s) Oral daily  atorvastatin 10 milliGRAM(s) Oral at bedtime  escitalopram 20 milliGRAM(s) Oral daily  finasteride 5 milliGRAM(s) Oral daily  hydrALAZINE 10 milliGRAM(s) Oral two times a day  lidocaine   4% Patch 1 Patch Transdermal daily  naloxone Injectable 0.4 milliGRAM(s) IV Push once  pantoprazole    Tablet 40 milliGRAM(s) Oral two times a day  polyethylene glycol 3350 17 Gram(s) Oral daily  QUEtiapine 12.5 milliGRAM(s) Oral <User Schedule>  QUEtiapine 12.5 milliGRAM(s) Oral at bedtime  senna 2 Tablet(s) Oral at bedtime  tamsulosin 0.8 milliGRAM(s) Oral at bedtime    MEDICATIONS  (PRN):  acetaminophen     Tablet .. 650 milliGRAM(s) Oral every 6 hours PRN Temp greater or equal to 38C (100.4F), Mild Pain (1 - 3)  aluminum hydroxide/magnesium hydroxide/simethicone Suspension 30 milliLiter(s) Oral every 4 hours PRN Dyspepsia  bisacodyl 5 milliGRAM(s) Oral daily PRN Constipation  HYDROmorphone  Injectable 0.25 milliGRAM(s) IV Push every 4 hours PRN Moderate Pain (4 - 6)  HYDROmorphone  Injectable 0.5 milliGRAM(s) IV Push every 4 hours PRN Severe Pain (7 - 10)  melatonin 3 milliGRAM(s) Oral at bedtime PRN Insomnia  ondansetron Injectable 4 milliGRAM(s) IV Push every 8 hours PRN Nausea and/or Vomiting      Allergies    oxycodone (Other; Flushing)  tramadol (Other (Mod to Severe); Flushing)    Intolerances        SOCIAL HISTORY:    FAMILY HISTORY:      Vital Signs Last 24 Hrs  T(C): 36.7 (23 Feb 2023 08:08), Max: 37.3 (22 Feb 2023 17:50)  T(F): 98 (23 Feb 2023 08:08), Max: 99.1 (22 Feb 2023 17:50)  HR: 121 (23 Feb 2023 08:08) (90 - 121)  BP: 138/74 (23 Feb 2023 08:08) (117/93 - 142/73)  BP(mean): --  RR: 18 (23 Feb 2023 08:08) (17 - 18)  SpO2: 95% (23 Feb 2023 08:08) (93% - 98%)    Parameters below as of 23 Feb 2023 08:08  Patient On (Oxygen Delivery Method): room air        PHYSICAL EXAM:    General: No distress, No anxiety  VITALS  T(C): 36.7 (02-23-23 @ 08:08), Max: 37.3 (02-22-23 @ 17:50)  HR: 121 (02-23-23 @ 08:08) (90 - 121)  BP: 138/74 (02-23-23 @ 08:08) (117/93 - 142/73)  RR: 18 (02-23-23 @ 08:08) (17 - 18)  SpO2: 95% (02-23-23 @ 08:08) (93% - 98%)            Skin     : No jaundice  HEENT: Normocephalic, no icterus , EOM full , No epistaxis  Lung    : No resp distress  Abdo:   : Soft, Non tender, No guarding, No distension   Back    : No CVAT b/l - no grimacing   Genitalia Male: alford with red tinged urine no clots             LABS:                        8.8    15.92 )-----------( 161      ( 23 Feb 2023 10:13 )             26.4     02-23    141  |  111<H>  |  33<H>  ----------------------------<  119<H>  4.0   |  22  |  1.19    Ca    8.9      23 Feb 2023 05:20      PT/INR - ( 22 Feb 2023 06:33 )   PT: 11.3 sec;   INR: 0.97 ratio         PTT - ( 22 Feb 2023 06:33 )  PTT:24.9 sec      
HPI:  HPI:  88M w/PMH BPH, Knee pain, Parkinson's disease w/ dementia, HTN, AVR, presents BIB wife from home c/o FTT, decreased po intake over past several days and not walking much d/t knee pain, feet pain.  Pt is AAOx2 (PP), hx obtained from wife at bedside.  She endorses that pt normally ambulatory but over past several days, he's been more WC and Bedbound.  Wife also endorses that pt not eating as he c/o fear of food will go up his nose and has been having sensation of diaphoresis at night, although he's not sweating.  She's started giving pt "edibles" hoping that it will help calm him down but not much relief from pain.  She denies any notable fever, n/v/d, abd pain, sob, cough.  Pt can confirm that he doesn't want to eat out of fear of getting food in the back of his nose and tells me that his Rt knee and bottom of his feet hurt.      In ED, wbc 10, UA neg, cxr neg, Na 134, Cr 1.6 (prior 1.1), HR 90s, given IVF, morphine x1.       PAST MEDICAL & SURGICAL HISTORY:  parkinson's disease w/dementia  Hypertension  BPH (benign prostatic hyperplasia)  High cholesterol   s/p knee and hip replacements  Non Obstructive CAD  Anemia  Dementia  History of total right knee replacement (TKR) X2  History of total hip replacement, right  S/P AVR (aortic valve replacement)        Review of Systems:   cannot assess d/t pt's dementia       Social History:   lives w/ wife and requires assistance    FAMILY HISTORY:  cannot assess d/t pt's dementia      MEDICATIONS  (STANDING):  amLODIPine   Tablet 5 milliGRAM(s) Oral daily  atorvastatin 10 milliGRAM(s) Oral at bedtime  enoxaparin Injectable 40 milliGRAM(s) SubCutaneous every 24 hours  escitalopram 20 milliGRAM(s) Oral daily  finasteride 5 milliGRAM(s) Oral daily  hydrALAZINE 10 milliGRAM(s) Oral daily  lidocaine   4% Patch 1 Patch Transdermal daily  naloxone Injectable 0.4 milliGRAM(s) IV Push once  polyethylene glycol 3350 17 Gram(s) Oral daily  senna 2 Tablet(s) Oral at bedtime  sodium chloride 0.9%. 1000 milliLiter(s) (80 mL/Hr) IV Continuous <Continuous>  tamsulosin 0.8 milliGRAM(s) Oral at bedtime    MEDICATIONS  (PRN):  acetaminophen     Tablet .. 650 milliGRAM(s) Oral every 6 hours PRN Temp greater or equal to 38C (100.4F), Mild Pain (1 - 3)  aluminum hydroxide/magnesium hydroxide/simethicone Suspension 30 milliLiter(s) Oral every 4 hours PRN Dyspepsia  bisacodyl 5 milliGRAM(s) Oral daily PRN Constipation  HYDROmorphone  Injectable 0.25 milliGRAM(s) IV Push every 4 hours PRN Moderate Pain (4 - 6)  HYDROmorphone  Injectable 0.5 milliGRAM(s) IV Push every 4 hours PRN Severe Pain (7 - 10)  melatonin 3 milliGRAM(s) Oral at bedtime PRN Insomnia  ondansetron Injectable 4 milliGRAM(s) IV Push every 8 hours PRN Nausea and/or Vomiting      PHYSICAL EXAM:  Vital Signs Last 24 Hrs  T(C): 36.6 (2023 15:26), Max: 37.4 (2023 13:00)  T(F): 97.8 (2023 15:26), Max: 99.3 (2023 13:00)  HR: 86 (2023 15:26) (86 - 102)  BP: 132/72 (2023 15:26) (132/72 - 140/68)  BP(mean): --  RR: 19 (2023 15:26) (15 - 20)  SpO2: 98% (2023 15:26) (98% - 100%)    Parameters below as of 2023 15:26  Patient On (Oxygen Delivery Method): room air      GENERAL: NAD,   HEAD:  Atraumatic, Normocephalic  EYES: EOMI, PERRLA, conjunctiva and sclera clear  ENT: normal hearing, no nasal discharge, throat clear, dentition normal  NECK: Supple, No JVD, no LAD, no thyromegaly   CHEST/LUNG: Clear to auscultation bilaterally; No wheeze, respirations unlabored  HEART: Regular rate and rhythm; No murmurs, rubs, or gallops  ABDOMEN: Soft, Nontender, Nondistended; Bowel sounds present, no HSM  EXTREMITIES:  2+ Peripheral Pulses, No clubbing, cyanosis, ++ RLE calf >LLE in size w/ TTP, dec ROM d/t swelling of knee and pain  PSYCH: AAOx2, normal behavior  NEUROLOGY: non-focal, sensory and cn 2-12 intact, speech/language intact  SKIN: No visible rashes or lesions    LABS:                        11.9   10.72 )-----------( 198      ( 2023 12:13 )             36.0     02-    134<L>  |  102  |  33<H>  ----------------------------<  111<H>  4.5   |  27  |  1.60<H>    Ca    9.4      2023 12:13  Phos  3.2     -  Mg     2.0         TPro  7.6  /  Alb  3.7  /  TBili  0.5  /  DBili  x   /  AST  15  /  ALT  30  /  AlkPhos  109            Urinalysis Basic - ( 2023 12:13 )    Color: Yellow / Appearance: Clear / S.015 / pH: x  Gluc: x / Ketone: Negative  / Bili: Negative / Urobili: Negative   Blood: x / Protein: Negative / Nitrite: Negative   Leuk Esterase: Negative / RBC: x / WBC x   Sq Epi: x / Non Sq Epi: x / Bacteria: x        RADIOLOGY & ADDITIONAL TESTS:    Imaging Personally Reviewed:  cxr neg  EKG Personally Reviewed:  nsr  Consultant(s) Notes Reviewed:      Care Discussed with Consultants/Other Providers:   (2023 17:16)      PAST MEDICAL & SURGICAL HISTORY:  Hypertension      BPH (benign prostatic hyperplasia)      High cholesterol      Chronic pain  s/p knee and hip replacements      Mild CAD  Non Obstructive CAD      S/P AVR (aortic valve replacement)      Anemia      Dementia      History of total right knee replacement (TKR)  X2      History of total hip replacement, right      S/P AVR (aortic valve replacement)          MEDICATIONS  (STANDING):  amLODIPine   Tablet 5 milliGRAM(s) Oral daily  atorvastatin 10 milliGRAM(s) Oral at bedtime  escitalopram 20 milliGRAM(s) Oral daily  finasteride 5 milliGRAM(s) Oral daily  hydrALAZINE 10 milliGRAM(s) Oral two times a day  lidocaine   4% Patch 1 Patch Transdermal daily  naloxone Injectable 0.4 milliGRAM(s) IV Push once  pantoprazole  Injectable 40 milliGRAM(s) IV Push two times a day  polyethylene glycol 3350 17 Gram(s) Oral daily  QUEtiapine 12.5 milliGRAM(s) Oral <User Schedule>  QUEtiapine 12.5 milliGRAM(s) Oral at bedtime  senna 2 Tablet(s) Oral at bedtime  sodium chloride 0.9%. 1000 milliLiter(s) (80 mL/Hr) IV Continuous <Continuous>  tamsulosin 0.8 milliGRAM(s) Oral at bedtime    MEDICATIONS  (PRN):  acetaminophen     Tablet .. 650 milliGRAM(s) Oral every 6 hours PRN Temp greater or equal to 38C (100.4F), Mild Pain (1 - 3)  aluminum hydroxide/magnesium hydroxide/simethicone Suspension 30 milliLiter(s) Oral every 4 hours PRN Dyspepsia  bisacodyl 5 milliGRAM(s) Oral daily PRN Constipation  HYDROmorphone  Injectable 0.25 milliGRAM(s) IV Push every 4 hours PRN Moderate Pain (4 - 6)  HYDROmorphone  Injectable 0.5 milliGRAM(s) IV Push every 4 hours PRN Severe Pain (7 - 10)  melatonin 3 milliGRAM(s) Oral at bedtime PRN Insomnia  ondansetron Injectable 4 milliGRAM(s) IV Push every 8 hours PRN Nausea and/or Vomiting      Allergies    oxycodone (Other; Flushing)  tramadol (Other (Mod to Severe); Flushing)    Intolerances        SOCIAL HISTORY:    FAMILY HISTORY:   Non-contributory    REVIEW OF SYSTEMS      General:	    Respiratory and Thorax:  	  Cardiovascular:	    Gastrointestinal:	    Musculoskeletal:	   Vital Signs Last 24 Hrs  T(C): 36.7 (2023 07:27), Max: 36.8 (2023 18:00)  T(F): 98 (2023 07:27), Max: 98.2 (2023 18:00)  HR: 110 (2023 07:27) (90 - 110)  BP: 108/52 (2023 07:27) (108/52 - 149/77)  BP(mean): --  RR: 19 (2023 07:27) (18 - 20)  SpO2: 100% (2023 07:27) (97% - 100%)    Parameters below as of 2023 07:27  Patient On (Oxygen Delivery Method): room air        HEENT Pallor  Chest : Clear to Auscultation  CVS : S1S2 Normal.No murmurs.  Abdomen: Soft.Non tender .Normal bowel sounds.No Organomegaly.  CNS:Confused    LABS:                        6.3    7.05  )-----------( 134      ( 2023 15:37 )             19.5     02-    138  |  109<H>  |  36<H>  ----------------------------<  108<H>  4.7   |  24  |  1.28    Ca    8.6      2023 06:32  Phos  3.2     02-20  Mg     2.0     -20    TPro  7.6  /  Alb  3.7  /  TBili  0.5  /  DBili  x   /  AST  15  /  ALT  30  /  AlkPhos  109  02-20      LIVER FUNCTIONS - ( 2023 12:13 )  Alb: 3.7 g/dL / Pro: 7.6 gm/dL / ALK PHOS: 109 U/L / ALT: 30 U/L / AST: 15 U/L / GGT: x             RADIOLOGY & ADDITIONAL STUDIES:

## 2023-02-25 PROCEDURE — 99232 SBSQ HOSP IP/OBS MODERATE 35: CPT | Mod: GC

## 2023-02-25 RX ADMIN — Medication 10 MILLIGRAM(S): at 05:05

## 2023-02-25 RX ADMIN — TAMSULOSIN HYDROCHLORIDE 0.8 MILLIGRAM(S): 0.4 CAPSULE ORAL at 21:39

## 2023-02-25 RX ADMIN — HYDROMORPHONE HYDROCHLORIDE 2 MILLIGRAM(S): 2 INJECTION INTRAMUSCULAR; INTRAVENOUS; SUBCUTANEOUS at 10:10

## 2023-02-25 RX ADMIN — FINASTERIDE 5 MILLIGRAM(S): 5 TABLET, FILM COATED ORAL at 09:18

## 2023-02-25 RX ADMIN — QUETIAPINE FUMARATE 25 MILLIGRAM(S): 200 TABLET, FILM COATED ORAL at 21:40

## 2023-02-25 RX ADMIN — LIDOCAINE 1 PATCH: 4 CREAM TOPICAL at 11:39

## 2023-02-25 RX ADMIN — HYDROMORPHONE HYDROCHLORIDE 4 MILLIGRAM(S): 2 INJECTION INTRAMUSCULAR; INTRAVENOUS; SUBCUTANEOUS at 21:46

## 2023-02-25 RX ADMIN — PANTOPRAZOLE SODIUM 40 MILLIGRAM(S): 20 TABLET, DELAYED RELEASE ORAL at 05:05

## 2023-02-25 RX ADMIN — ESCITALOPRAM OXALATE 20 MILLIGRAM(S): 10 TABLET, FILM COATED ORAL at 11:40

## 2023-02-25 RX ADMIN — HYDROMORPHONE HYDROCHLORIDE 4 MILLIGRAM(S): 2 INJECTION INTRAMUSCULAR; INTRAVENOUS; SUBCUTANEOUS at 12:14

## 2023-02-25 RX ADMIN — HYDROMORPHONE HYDROCHLORIDE 4 MILLIGRAM(S): 2 INJECTION INTRAMUSCULAR; INTRAVENOUS; SUBCUTANEOUS at 05:54

## 2023-02-25 RX ADMIN — HYDROMORPHONE HYDROCHLORIDE 4 MILLIGRAM(S): 2 INJECTION INTRAMUSCULAR; INTRAVENOUS; SUBCUTANEOUS at 16:36

## 2023-02-25 RX ADMIN — QUETIAPINE FUMARATE 25 MILLIGRAM(S): 200 TABLET, FILM COATED ORAL at 14:39

## 2023-02-25 RX ADMIN — HYDROMORPHONE HYDROCHLORIDE 2 MILLIGRAM(S): 2 INJECTION INTRAMUSCULAR; INTRAVENOUS; SUBCUTANEOUS at 09:21

## 2023-02-25 RX ADMIN — HYDROMORPHONE HYDROCHLORIDE 4 MILLIGRAM(S): 2 INJECTION INTRAMUSCULAR; INTRAVENOUS; SUBCUTANEOUS at 05:05

## 2023-02-25 RX ADMIN — Medication 30 MILLILITER(S): at 13:15

## 2023-02-25 RX ADMIN — SENNA PLUS 2 TABLET(S): 8.6 TABLET ORAL at 21:39

## 2023-02-25 RX ADMIN — LIDOCAINE 1 PATCH: 4 CREAM TOPICAL at 12:57

## 2023-02-25 RX ADMIN — ATORVASTATIN CALCIUM 10 MILLIGRAM(S): 80 TABLET, FILM COATED ORAL at 21:39

## 2023-02-25 RX ADMIN — AMLODIPINE BESYLATE 5 MILLIGRAM(S): 2.5 TABLET ORAL at 05:16

## 2023-02-25 RX ADMIN — PANTOPRAZOLE SODIUM 40 MILLIGRAM(S): 20 TABLET, DELAYED RELEASE ORAL at 17:39

## 2023-02-25 RX ADMIN — POLYETHYLENE GLYCOL 3350 17 GRAM(S): 17 POWDER, FOR SOLUTION ORAL at 09:18

## 2023-02-25 NOTE — PROGRESS NOTE ADULT - SUBJECTIVE AND OBJECTIVE BOX
HPI:  88M w/PMH BPH, Knee pain, Parkinson's disease w/ dementia, HTN, AVR, presents BIB wife from home c/o FTT, decreased po intake over past several days and not walking much d/t knee pain, feet pain.  Pt is AAOx2 (PP), hx obtained from wife at bedside.  She endorses that pt normally ambulatory but over past several days, he's been more WC and Bedbound.  Wife also endorses that pt not eating as he c/o fear of food will go up his nose and has been having sensation of diaphoresis at night, although he's not sweating.  She's started giving pt "edibles" hoping that it will help calm him down but not much relief from pain.  She denies any notable fever, n/v/d, abd pain, sob, cough.  Pt can confirm that he doesn't want to eat out of fear of getting food in the back of his nose and tells me that his Rt knee and bottom of his feet hurt.  In ED, wbc 10, UA neg, cxr neg, Na 134, Cr 1.6 (prior 1.1), HR 90s, given IVF, morphine x1.     Subjective: Pt seen and evaluated by bedside. Alford draining dark red urine. Pt's family wants hospice care.     REVIEW OF SYSTEMS:  unable to assess due to dementia         =======================================================  Vitals:  T(F): 98.1 (25 Feb 2023 07:33), Max: 98.5 (24 Feb 2023 21:02)  HR: 74 (25 Feb 2023 07:33)  BP: 102/58 (25 Feb 2023 07:33)  RR: 18 (25 Feb 2023 07:33)  SpO2: 98% (25 Feb 2023 07:33) (95% - 98%)  temp max in last 48H T(F): , Max: 98.8 (02-23-23 @ 15:11)      =======================================================    PHYSICAL EXAM:  Constitutional: Pt lying in bed, awake and alert, NAD  HEENT: EOMI, normal hearing, dry mucous membranes  Neck: Soft and supple, no JVD  Respiratory: CTABL, No wheezing, rales or rhonchi  Cardiovascular: S1S2+, RRR, no M/G/R  Gastrointestinal: BS+, soft, NT/ND, no guarding, no rebound  : alford with dark reddish urine, (likely from alford trauma)  Extremities: calf swollen b/l  and tender to palpation, b/l knee swelling and pain   Vascular: 2+ peripheral pulses  Neurological: AAOx2, no focal deficits  Musculoskeletal: 5/5 strength b/l upper and lower extremities  Skin: No rashes    =======================================================  Current Antibiotics:    Other medications:  amLODIPine   Tablet 5 milliGRAM(s) Oral daily  atorvastatin 10 milliGRAM(s) Oral at bedtime  escitalopram 20 milliGRAM(s) Oral daily  finasteride 5 milliGRAM(s) Oral daily  hydrALAZINE 10 milliGRAM(s) Oral two times a day  lidocaine   4% Patch 1 Patch Transdermal daily  naloxone Injectable 0.4 milliGRAM(s) IV Push once  pantoprazole    Tablet 40 milliGRAM(s) Oral two times a day  polyethylene glycol 3350 17 Gram(s) Oral daily  QUEtiapine 25 milliGRAM(s) Oral at bedtime  QUEtiapine 25 milliGRAM(s) Oral <User Schedule>  senna 2 Tablet(s) Oral at bedtime  tamsulosin 0.8 milliGRAM(s) Oral at bedtime      =======================================================  Labs:                        7.6    x     )-----------( x        ( 24 Feb 2023 12:29 )             23.1             Culture - Urine (collected 02-20-23 @ 12:13)  Source: Clean Catch Clean Catch (Midstream)  Final Report (02-21-23 @ 14:59):    No growth        SARS-CoV-2 Result: NotDete (02-20-23 @ 15:04)      02-24-23 @ 07:01  -  02-25-23 @ 07:00  --------------------------------------------------------  IN: 0 mL / OUT: 400 mL / NET: -400 mL        ========================================================  Imaging: no new imaging.        HPI:  88M w/PMH BPH, Knee pain, Parkinson's disease w/ dementia, HTN, AVR, presents BIB wife from home c/o FTT, decreased po intake over past several days and not walking much d/t knee pain, feet pain.  Pt is AAOx2 (PP), hx obtained from wife at bedside.  She endorses that pt normally ambulatory but over past several days, he's been more WC and Bedbound.  Wife also endorses that pt not eating as he c/o fear of food will go up his nose and has been having sensation of diaphoresis at night, although he's not sweating.  She's started giving pt "edibles" hoping that it will help calm him down but not much relief from pain.  She denies any notable fever, n/v/d, abd pain, sob, cough.  Pt can confirm that he doesn't want to eat out of fear of getting food in the back of his nose and tells me that his Rt knee and bottom of his feet hurt.  In ED, wbc 10, UA neg, cxr neg, Na 134, Cr 1.6 (prior 1.1), HR 90s, given IVF, morphine x1.     Subjective: Pt seen and evaluated by bedside. Pt now on hospice care.     REVIEW OF SYSTEMS:  unable to assess due to dementia         =======================================================  Vitals:  T(F): 98.1 (25 Feb 2023 07:33), Max: 98.5 (24 Feb 2023 21:02)  HR: 74 (25 Feb 2023 07:33)  BP: 102/58 (25 Feb 2023 07:33)  RR: 18 (25 Feb 2023 07:33)  SpO2: 98% (25 Feb 2023 07:33) (95% - 98%)  temp max in last 48H T(F): , Max: 98.8 (02-23-23 @ 15:11)      =======================================================    PHYSICAL EXAM:  Constitutional: Pt lying in bed, awake and alert, NAD  HEENT: EOMI, normal hearing, dry mucous membranes  Neck: Soft and supple, no JVD  Respiratory: CTABL, No wheezing, rales or rhonchi  Cardiovascular: S1S2+, RRR, no M/G/R  Gastrointestinal: BS+, soft, NT/ND, no guarding, no rebound  : off alford, void trial   Extremities: calf swollen b/l  and tender to palpation, b/l knee swelling and pain   Vascular: 2+ peripheral pulses  Neurological: AAOx2, no focal deficits  Musculoskeletal: moving upper and lower extremities, R shoulder pain   Skin: No rashes    =======================================================  Current Antibiotics:    Other medications:  amLODIPine   Tablet 5 milliGRAM(s) Oral daily  atorvastatin 10 milliGRAM(s) Oral at bedtime  escitalopram 20 milliGRAM(s) Oral daily  finasteride 5 milliGRAM(s) Oral daily  hydrALAZINE 10 milliGRAM(s) Oral two times a day  lidocaine   4% Patch 1 Patch Transdermal daily  naloxone Injectable 0.4 milliGRAM(s) IV Push once  pantoprazole    Tablet 40 milliGRAM(s) Oral two times a day  polyethylene glycol 3350 17 Gram(s) Oral daily  QUEtiapine 25 milliGRAM(s) Oral at bedtime  QUEtiapine 25 milliGRAM(s) Oral <User Schedule>  senna 2 Tablet(s) Oral at bedtime  tamsulosin 0.8 milliGRAM(s) Oral at bedtime      =======================================================  Labs:                        7.6    x     )-----------( x        ( 24 Feb 2023 12:29 )             23.1             Culture - Urine (collected 02-20-23 @ 12:13)  Source: Clean Catch Clean Catch (Midstream)  Final Report (02-21-23 @ 14:59):    No growth        SARS-CoV-2 Result: NotDete (02-20-23 @ 15:04)      02-24-23 @ 07:01  -  02-25-23 @ 07:00  --------------------------------------------------------  IN: 0 mL / OUT: 400 mL / NET: -400 mL        ========================================================  Imaging: no new imaging.

## 2023-02-25 NOTE — PROGRESS NOTE ADULT - ASSESSMENT
88M w/PMH BPH, Knee pain, Parkinson's disease w/ dementia, HTN, AVR, presents BIB wife from home c/o FTT, decreased po intake over past several days and not walking much d/t knee pain, feet pain.  Pt is AAOx2 (PP), fears food will end up in back of his nose if he eats, In ED, wbc 10, UA neg, cxr neg, Na 134, Cr 1.6 (prior 1.1), HR 90s, given IVF, morphine x1. Pt transitioning to hospice care.     #acute GI bleed developed after the admission   -Hgb 11.9 -> 7.8 -> 6.3 within ~24hrs  -hx of celebrex use   -s/p 2 units pRBC  -transfuse if Hgb <7  -serial h/h   -Cardio rec appreciated: no cardiac contraindication for EGD   -s/p EGD showing no further bleeding   -GI rec appreciated      - No Nsaids    - Advance to Regular diet    - PPI PO BID    - Office visit next week    #urinary retention  -alford with hematuria 2/2 alford trauma   -urology rec appreciated     - Continue Flomax and Finasteride     - Keep indwelling alford in place     - Trial of void today, if unsuccessful pt to do trial of void in 1 week outpatient and d/c with alford to leg bag     - Pt can follow up with Urologists in [place] (Dr. Chery/Chavez/Delvis) or Follow up with Dr. Randy Wilson (located in [facility] [phone]) for further management    #FTT w/ poor po intake in setting of delirium  - treat underlying cause  - nutrition cs  - supportive care  - aspiration precautions    #Delirium w/ dementia- no e/o infection, possibly d/t dehydration, progression of dementia  #Parkinson's dementia  - psych rec appreciated:     - escitalopram 20mg qd      - increased seroquel to 25mg qd     - increased seroquel to 25mg qhs      - melatonin 3mg qhs      - ondansetron 4mg IV 8hrs PRN for n/v  - outpt f/u w/ his neurologist for eval of PD, dementia  - possibly related to recent edibles- d/w wife    #Chronic Rt knee pain w/ increased swelling of RLE  - RLE doppler with no evidence of dvt   - c/w lido patch, iv dilaudid, ice to knee  - f/u PT eval   - hold tizandine, celebrex home doses  -pt now on hospice care:       -Dilaudid 2 mg PO Q4H PRN mod pain if tolerating PO     -Dilaudid 4 mg PO Q4H PRN severe pain if tolerating PO     -can switch back to IV if pain uncontrolled with PO     #Feet pain- possibly peripheral neuropathy  - b12, folate nl     #HTN- bp stable  - c/w pt's dose of hydralazine, amlodipine    #MAYRA 2/2 likely dehydration  #hyponatremia, mild  - resolved   - s/p NS @80cc/hr    #PPX- hold lovenox as pt now hospice care     Dispo:   -pt transitioning into hospice care  -palliative following    Discussed with Dr. Henley  88M w/PMH BPH, Knee pain, Parkinson's disease w/ dementia, HTN, AVR, presents BIB wife from home c/o FTT, decreased po intake over past several days and not walking much d/t knee pain, feet pain.  Pt is AAOx2 (PP), fears food will end up in back of his nose if he eats, In ED, wbc 10, UA neg, cxr neg, Na 134, Cr 1.6 (prior 1.1), HR 90s, given IVF, morphine x1. Pt transitioned to hospice care.     #acute GI bleed developed after the admission   -Hgb 11.9 -> 7.8 -> 6.3 within ~24hrs; now stable in 7s   -hx of celebrex use   -s/p 2 units pRBC  -transfuse if Hgb <7  -serial h/h   -Cardio rec appreciated: no cardiac contraindication for EGD   -s/p EGD showing no further bleeding   -GI rec appreciated      - No Nsaids    - Advance to Regular diet    - PPI PO BID    - Office visit next week  -PT NOW TRANSITIONING TO HOME HOSPICE.     #urinary retention  -alford with hematuria 2/2 alford trauma   -urology rec appreciated     - Continue Flomax and Finasteride     - Keep indwelling alford in place     - Trial of void, if unsuccessful pt to do trial of void in 1 week outpatient and d/c with alford to leg bag     - Pt can follow up with Urologists in [place] (Dr. Chery/Chavez/Delvis) or Follow up with Dr. Randy Wilson (located in [facility] [phone]) for further management  -continue with void trial     #FTT w/ poor po intake in setting of delirium  - treat underlying cause  - nutrition cs  - supportive care  - aspiration precautions    #Delirium w/ dementia- no e/o infection, possibly d/t dehydration, progression of dementia  #Parkinson's dementia  - psych rec appreciated:     - escitalopram 20mg qd      - increased seroquel to 25mg qd     - increased seroquel to 25mg qhs      - melatonin 3mg qhs      - ondansetron 4mg IV 8hrs PRN for n/v  - outpt f/u w/ his neurologist for eval of PD, dementia  - possibly related to recent edibles- d/w wife    #Chronic Rt knee pain w/ increased swelling of RLE  - RLE doppler with no evidence of dvt   - c/w lido patch, iv dilaudid, ice to knee  - f/u PT eval   - hold tizandine, celebrex home doses  -pt now on hospice care:       -Dilaudid 2 mg PO Q4H PRN mod pain if tolerating PO     -Dilaudid 4 mg PO Q4H PRN severe pain if tolerating PO     -can switch back to IV if pain uncontrolled with PO     #Feet pain- possibly peripheral neuropathy  - b12, folate nl     #HTN- bp stable  - c/w pt's dose of hydralazine, amlodipine    #MAYRA 2/2 likely dehydration  #hyponatremia, mild  - resolved   - s/p NS @80cc/hr    #PPX- hold lovenox as pt now hospice care     Dispo:   -pt transitioning into hospice care  -palliative following    Discussed with Dr. Henley

## 2023-02-26 PROCEDURE — 99232 SBSQ HOSP IP/OBS MODERATE 35: CPT | Mod: GC

## 2023-02-26 RX ORDER — OXYMETAZOLINE HYDROCHLORIDE 0.5 MG/ML
1 SPRAY NASAL
Refills: 0 | Status: COMPLETED | OUTPATIENT
Start: 2023-02-26 | End: 2023-02-28

## 2023-02-26 RX ORDER — PHENAZOPYRIDINE HCL 100 MG
100 TABLET ORAL EVERY 8 HOURS
Refills: 0 | Status: DISCONTINUED | OUTPATIENT
Start: 2023-02-26 | End: 2023-02-26

## 2023-02-26 RX ORDER — PHENAZOPYRIDINE HCL 100 MG
100 TABLET ORAL EVERY 8 HOURS
Refills: 0 | Status: COMPLETED | OUTPATIENT
Start: 2023-02-26 | End: 2023-02-27

## 2023-02-26 RX ADMIN — HYDROMORPHONE HYDROCHLORIDE 4 MILLIGRAM(S): 2 INJECTION INTRAMUSCULAR; INTRAVENOUS; SUBCUTANEOUS at 14:20

## 2023-02-26 RX ADMIN — POLYETHYLENE GLYCOL 3350 17 GRAM(S): 17 POWDER, FOR SOLUTION ORAL at 09:28

## 2023-02-26 RX ADMIN — HYDROMORPHONE HYDROCHLORIDE 4 MILLIGRAM(S): 2 INJECTION INTRAMUSCULAR; INTRAVENOUS; SUBCUTANEOUS at 10:25

## 2023-02-26 RX ADMIN — TAMSULOSIN HYDROCHLORIDE 0.8 MILLIGRAM(S): 0.4 CAPSULE ORAL at 22:00

## 2023-02-26 RX ADMIN — FINASTERIDE 5 MILLIGRAM(S): 5 TABLET, FILM COATED ORAL at 09:29

## 2023-02-26 RX ADMIN — HYDROMORPHONE HYDROCHLORIDE 2 MILLIGRAM(S): 2 INJECTION INTRAMUSCULAR; INTRAVENOUS; SUBCUTANEOUS at 17:20

## 2023-02-26 RX ADMIN — QUETIAPINE FUMARATE 25 MILLIGRAM(S): 200 TABLET, FILM COATED ORAL at 01:35

## 2023-02-26 RX ADMIN — HYDROMORPHONE HYDROCHLORIDE 4 MILLIGRAM(S): 2 INJECTION INTRAMUSCULAR; INTRAVENOUS; SUBCUTANEOUS at 18:07

## 2023-02-26 RX ADMIN — PANTOPRAZOLE SODIUM 40 MILLIGRAM(S): 20 TABLET, DELAYED RELEASE ORAL at 18:02

## 2023-02-26 RX ADMIN — Medication 100 MILLIGRAM(S): at 22:01

## 2023-02-26 RX ADMIN — SENNA PLUS 2 TABLET(S): 8.6 TABLET ORAL at 22:00

## 2023-02-26 RX ADMIN — HYDROMORPHONE HYDROCHLORIDE 2 MILLIGRAM(S): 2 INJECTION INTRAMUSCULAR; INTRAVENOUS; SUBCUTANEOUS at 16:25

## 2023-02-26 RX ADMIN — AMLODIPINE BESYLATE 5 MILLIGRAM(S): 2.5 TABLET ORAL at 05:58

## 2023-02-26 RX ADMIN — ATORVASTATIN CALCIUM 10 MILLIGRAM(S): 80 TABLET, FILM COATED ORAL at 22:01

## 2023-02-26 RX ADMIN — QUETIAPINE FUMARATE 25 MILLIGRAM(S): 200 TABLET, FILM COATED ORAL at 22:00

## 2023-02-26 RX ADMIN — QUETIAPINE FUMARATE 25 MILLIGRAM(S): 200 TABLET, FILM COATED ORAL at 15:48

## 2023-02-26 RX ADMIN — Medication 3 MILLIGRAM(S): at 22:01

## 2023-02-26 RX ADMIN — HYDROMORPHONE HYDROCHLORIDE 4 MILLIGRAM(S): 2 INJECTION INTRAMUSCULAR; INTRAVENOUS; SUBCUTANEOUS at 03:24

## 2023-02-26 RX ADMIN — HYDROMORPHONE HYDROCHLORIDE 4 MILLIGRAM(S): 2 INJECTION INTRAMUSCULAR; INTRAVENOUS; SUBCUTANEOUS at 13:24

## 2023-02-26 RX ADMIN — Medication 10 MILLIGRAM(S): at 05:58

## 2023-02-26 RX ADMIN — Medication 5 MILLIGRAM(S): at 18:02

## 2023-02-26 RX ADMIN — HYDROMORPHONE HYDROCHLORIDE 4 MILLIGRAM(S): 2 INJECTION INTRAMUSCULAR; INTRAVENOUS; SUBCUTANEOUS at 09:28

## 2023-02-26 RX ADMIN — ESCITALOPRAM OXALATE 20 MILLIGRAM(S): 10 TABLET, FILM COATED ORAL at 11:56

## 2023-02-26 RX ADMIN — HYDROMORPHONE HYDROCHLORIDE 2 MILLIGRAM(S): 2 INJECTION INTRAMUSCULAR; INTRAVENOUS; SUBCUTANEOUS at 22:03

## 2023-02-26 RX ADMIN — PANTOPRAZOLE SODIUM 40 MILLIGRAM(S): 20 TABLET, DELAYED RELEASE ORAL at 05:59

## 2023-02-26 RX ADMIN — OXYMETAZOLINE HYDROCHLORIDE 1 SPRAY(S): 0.5 SPRAY NASAL at 22:00

## 2023-02-26 RX ADMIN — HYDROMORPHONE HYDROCHLORIDE 2 MILLIGRAM(S): 2 INJECTION INTRAMUSCULAR; INTRAVENOUS; SUBCUTANEOUS at 23:15

## 2023-02-26 RX ADMIN — Medication 100 MILLIGRAM(S): at 11:56

## 2023-02-26 NOTE — PROGRESS NOTE ADULT - SUBJECTIVE AND OBJECTIVE BOX
HPI:  88M w/PMH BPH, Knee pain, Parkinson's disease w/ dementia, HTN, AVR, presents BIB wife from home c/o Failure to thrive, decreased po intake over past several days and not walking much d/t knee pain, feet pain.  Pt is AAOx2 (PP), hx obtained from wife at bedside.  She endorses that pt normally ambulatory but over past several days, he's been more WC and Bedbound.  Wife also endorses that pt not eating as he c/o fear of food will go up his nose and has been having sensation of diaphoresis at night, although he's not sweating.  She's started giving pt "edibles" hoping that it will help calm him down but not much relief from pain.  She denies any notable fever, n/v/d, abd pain, sob, cough.  Pt can confirm that he doesn't want to eat out of fear of getting food in the back of his nose and tells me that his Rt knee and bottom of his feet hurt.  In ED, wbc 10, UA neg, cxr neg, Na 134, Cr 1.6 (prior 1.1), HR 90s, given IVF, morphine x1.     Subjective: Pt seen and evaluated by bedside. Pt now on hospice care.     REVIEW OF SYSTEMS:  unable to assess due to dementia         =======================================================  Vitals:  T(F): 98.1 (25 Feb 2023 07:33), Max: 98.5 (24 Feb 2023 21:02)  HR: 74 (25 Feb 2023 07:33)  BP: 102/58 (25 Feb 2023 07:33)  RR: 18 (25 Feb 2023 07:33)  SpO2: 98% (25 Feb 2023 07:33) (95% - 98%)  temp max in last 48H T(F): , Max: 98.8 (02-23-23 @ 15:11)      =======================================================    PHYSICAL EXAM:  Constitutional: Pt lying in bed, awake and alert, NAD  HEENT: EOMI, normal hearing, dry mucous membranes  Neck: Soft and supple, no JVD  Respiratory: CTABL, No wheezing, rales or rhonchi  Cardiovascular: S1S2+, RRR, no M/G/R  Gastrointestinal: BS+, soft, NT/ND, no guarding, no rebound  : off alford, void trial   Extremities: calf swollen b/l  and tender to palpation, b/l knee swelling and pain   Vascular: 2+ peripheral pulses  Neurological: AAOx2, no focal deficits  Musculoskeletal: moving upper and lower extremities, R shoulder pain   Skin: No rashes    =======================================================  Current Antibiotics:    Other medications:  amLODIPine   Tablet 5 milliGRAM(s) Oral daily  atorvastatin 10 milliGRAM(s) Oral at bedtime  escitalopram 20 milliGRAM(s) Oral daily  finasteride 5 milliGRAM(s) Oral daily  hydrALAZINE 10 milliGRAM(s) Oral two times a day  lidocaine   4% Patch 1 Patch Transdermal daily  naloxone Injectable 0.4 milliGRAM(s) IV Push once  pantoprazole    Tablet 40 milliGRAM(s) Oral two times a day  polyethylene glycol 3350 17 Gram(s) Oral daily  QUEtiapine 25 milliGRAM(s) Oral at bedtime  QUEtiapine 25 milliGRAM(s) Oral <User Schedule>  senna 2 Tablet(s) Oral at bedtime  tamsulosin 0.8 milliGRAM(s) Oral at bedtime      =======================================================  Labs:                        7.6    x     )-----------( x        ( 24 Feb 2023 12:29 )             23.1             Culture - Urine (collected 02-20-23 @ 12:13)  Source: Clean Catch Clean Catch (Midstream)  Final Report (02-21-23 @ 14:59):    No growth        SARS-CoV-2 Result: NotDete (02-20-23 @ 15:04)      02-24-23 @ 07:01  -  02-25-23 @ 07:00  --------------------------------------------------------  IN: 0 mL / OUT: 400 mL / NET: -400 mL        ========================================================  Imaging: no new imaging.        HPI:  88M w/ PMH BPH, Knee pain, Parkinson's disease w/ dementia, HTN, AVR, presents BIB wife from home c/o Failure to thrive, decreased po intake over past several days and not walking much d/t knee pain, feet pain.  Pt is AAOx2 (PP), hx obtained from wife at bedside.  She endorses that pt normally ambulatory but over past several days, he's been more WC and Bedbound.  Wife also endorses that pt not eating as he c/o fear of food will go up his nose and has been having sensation of diaphoresis at night, although he's not sweating.  She's started giving pt "edibles" hoping that it will help calm him down but not much relief from pain.  She denies any notable fever, n/v/d, abd pain, sob, cough.  Pt can confirm that he doesn't want to eat out of fear of getting food in the back of his nose and tells me that his Rt knee and bottom of his feet hurt.  In ED, wbc 10, UA neg, cxr neg, Na 134, Cr 1.6 (prior 1.1), HR 90s, given IVF, morphine x1.     Subjective:   2/26: Pt seen and evaluated by bedside. Pulled out alford overnight. Now re-inserted. Draining blood-tinged urine after trauma     REVIEW OF SYSTEMS:  unable to assess due to dementia     =======================================================  Subjective:       Vital Signs Last 24 Hrs  T(C): 36.8 (26 Feb 2023 07:44), Max: 36.8 (26 Feb 2023 07:44)  T(F): 98.2 (26 Feb 2023 07:44), Max: 98.2 (26 Feb 2023 07:44)  HR: 90 (26 Feb 2023 07:44) (89 - 102)  BP: 109/64 (26 Feb 2023 07:44) (93/46 - 146/75)  RR: 18 (26 Feb 2023 07:44) (17 - 18)  SpO2: 95% (26 Feb 2023 07:44) (92% - 95%)    Parameters below as of 26 Feb 2023 07:44  Patient On (Oxygen Delivery Method): room air      PHYSICAL EXAM:  Constitutional: Pt lying in bed, awake and alert, NAD  HEENT: EOMI, normal hearing, dry mucous membranes  Neck: Soft and supple, no JVD  Respiratory: CTABL, No wheezing, rales or rhonchi  Cardiovascular: S1S2+, RRR, systolic murmur  Gastrointestinal: BS+, soft, NT/ND, no guarding, no rebound  : alford  Extremities: calf swollen b/l  and tender to palpation, b/l knee swelling and pain   Vascular: 2+ peripheral pulses  Neurological: AAOx2  Skin: No rashes      =======================================================  Labs: all labs reviewed     =======================================================    MEDICATIONS  (STANDING):  amLODIPine   Tablet 5 milliGRAM(s) Oral daily  atorvastatin 10 milliGRAM(s) Oral at bedtime  escitalopram 20 milliGRAM(s) Oral daily  finasteride 5 milliGRAM(s) Oral daily  hydrALAZINE 10 milliGRAM(s) Oral two times a day  lidocaine   4% Patch 1 Patch Transdermal daily  naloxone Injectable 0.4 milliGRAM(s) IV Push once  oxymetazoline 0.05% Nasal Spray 1 Spray(s) Both Nostrils two times a day  pantoprazole    Tablet 40 milliGRAM(s) Oral two times a day  phenazopyridine 100 milliGRAM(s) Oral every 8 hours  polyethylene glycol 3350 17 Gram(s) Oral daily  QUEtiapine 25 milliGRAM(s) Oral at bedtime  QUEtiapine 25 milliGRAM(s) Oral <User Schedule>  senna 2 Tablet(s) Oral at bedtime  tamsulosin 0.8 milliGRAM(s) Oral at bedtime    MEDICATIONS  (PRN):  acetaminophen     Tablet .. 650 milliGRAM(s) Oral every 6 hours PRN Temp greater or equal to 38C (100.4F), Mild Pain (1 - 3)  aluminum hydroxide/magnesium hydroxide/simethicone Suspension 30 milliLiter(s) Oral every 4 hours PRN Dyspepsia  bisacodyl 5 milliGRAM(s) Oral daily PRN Constipation  HYDROmorphone   Tablet 2 milliGRAM(s) Oral every 4 hours PRN Moderate Pain (4 - 6)  HYDROmorphone   Tablet 4 milliGRAM(s) Oral every 4 hours PRN Severe Pain (7 - 10)  melatonin 3 milliGRAM(s) Oral at bedtime PRN Insomnia  ondansetron Injectable 4 milliGRAM(s) IV Push every 8 hours PRN Nausea and/or Vomiting  QUEtiapine 25 milliGRAM(s) Oral two times a day PRN agitation

## 2023-02-26 NOTE — PROGRESS NOTE ADULT - ASSESSMENT
88M w/PMH BPH, Knee pain, Parkinson's disease w/ dementia, HTN, AVR, presents BIB wife from home c/o FTT, decreased po intake over past several days and not walking much d/t knee pain, feet pain.  Pt is AAOx2 (PP), fears food will end up in back of his nose if he eats, In ED, wbc 10, UA neg, cxr neg, Na 134, Cr 1.6 (prior 1.1), HR 90s, given IVF, morphine x1. Pt transitioned to hospice care.     #acute GI bleed developed after the admission   -Hgb 11.9 -> 7.8 -> 6.3 within ~24hrs; now stable in 7s   -hx of celebrex use   -s/p 2 units pRBC  -transfuse if Hgb <7  -serial h/h   -Cardio rec appreciated: no cardiac contraindication for EGD   -s/p EGD showing no further bleeding   -GI rec appreciated      - No Nsaids    - Advance to Regular diet    - PPI PO BID    - Office visit next week  -PT NOW TRANSITIONING TO HOME HOSPICE.     #urinary retention  -alford with hematuria 2/2 alford trauma   -urology rec appreciated     - Continue Flomax and Finasteride     - Keep indwelling alford in place     - Trial of void, if unsuccessful pt to do trial of void in 1 week outpatient and d/c with alford to leg bag     - Pt can follow up with Urologists in [place] (Dr. Chery/Chavez/Delvis) or Follow up with Dr. Randy Wilson (located in [facility] [phone]) for further management  -continue with void trial     #FTT w/ poor po intake in setting of delirium  - treat underlying cause  - nutrition cs  - supportive care  - aspiration precautions    #Delirium w/ dementia- no e/o infection, possibly d/t dehydration, progression of dementia  #Parkinson's dementia  - psych rec appreciated:     - escitalopram 20mg qd      - increased seroquel to 25mg qd     - increased seroquel to 25mg qhs      - melatonin 3mg qhs      - ondansetron 4mg IV 8hrs PRN for n/v  - outpt f/u w/ his neurologist for eval of PD, dementia  - possibly related to recent edibles- d/w wife    #Chronic Rt knee pain w/ increased swelling of RLE  - RLE doppler with no evidence of dvt   - c/w lido patch, iv dilaudid, ice to knee  - f/u PT eval   - hold tizandine, celebrex home doses  -pt now on hospice care:       -Dilaudid 2 mg PO Q4H PRN mod pain if tolerating PO     -Dilaudid 4 mg PO Q4H PRN severe pain if tolerating PO     -can switch back to IV if pain uncontrolled with PO     #Feet pain- possibly peripheral neuropathy  - b12, folate nl     #HTN- bp stable  - c/w pt's dose of hydralazine, amlodipine    #MAYRA 2/2 likely dehydration  #hyponatremia, mild  - resolved   - s/p NS @80cc/hr    #PPX- hold lovenox as pt now hospice care     Dispo:   -pt transitioning into hospice care  -palliative following    Discussed with Dr. Henley  88M w/PMH BPH, Knee pain, Parkinson's disease w/ dementia, HTN, AVR, presents BIB wife from home c/o FTT, decreased po intake over past several days and not walking much d/t knee pain, feet pain.  Pt is AAOx2 (PP), fears food will end up in back of his nose if he eats, In ED, wbc 10, UA neg, cxr neg, Na 134, Cr 1.6 (prior 1.1), HR 90s, given IVF, morphine x1. Pt transitioned to hospice care.       #acute GI bleed developed after the admission   -Hgb 11.9 -> 7.8 -> 6.3 within ~24hrs; now stable in 7s   -hx of celebrex use   -s/p 2 units pRBC  -transfuse if Hgb <7  -s/p EGD showing no further bleeding   -GI rec appreciated      - No Nsaids    - Advance to Regular diet    - PPI PO BID    - Office visit after discharge   -PT NOW TRANSITIONING TO HOME HOSPICE.     #urinary retention  -alford with hematuria 2/2 alford trauma   -urology rec appreciated     - Continue Flomax and Finasteride     - Keep indwelling alford in place     - TOV failed. Repeat Trial of void in 1 week outpatient and d/c with alford to leg bag     - Pt can follow up with Urologists in [place] (Dr. Chery/Chavez/Delvis) or Follow up with Dr. Randy Wilson (located in [facility] [phone]) for further management      #FTT w/ poor po intake in setting of delirium  - treat underlying cause  - nutrition recs appreciated  - supportive care.  - afrin for nasal congestion  - aspiration precautions    #Delirium w/ dementia- no e/o infection, possibly d/t dehydration, progression of dementia  #Parkinson's dementia  - psych rec appreciated:     - escitalopram 20mg qd      - increased seroquel to 25mg qd     - increased seroquel to 25mg qhs      - melatonin 3mg qhs      - ondansetron 4mg IV 8hrs PRN for n/v  - outpt f/u w/ his neurologist for eval of PD, dementia  - possibly related to recent edibles- d/w wife    #Chronic Rt knee pain w/ increased swelling of RLE  - RLE doppler with no evidence of dvt   - c/w lido patch, iv dilaudid, ice to knee  - f/u PT eval   - hold tizandine, celebrex home doses  -pt now on hospice care:       -Dilaudid 2 mg PO Q4H PRN mod pain if tolerating PO     -Dilaudid 4 mg PO Q4H PRN severe pain if tolerating PO     -can switch back to IV if pain uncontrolled with PO     #Feet pain- possibly peripheral neuropathy  - b12, folate nl     #HTN- bp stable  - c/w pt's dose of hydralazine, amlodipine    #MAYRA 2/2 likely dehydration - resolved  #hyponatremia, mild  - resolved   - s/p NS @80cc/hr    #PPX- hold lovenox as pt now hospice care     Dispo:   -pt transitioning into hospice care  -palliative following    Discussed with Dr. Henley

## 2023-02-27 LAB
HCT VFR BLD CALC: 23.5 % — LOW (ref 39–50)
HGB BLD-MCNC: 7.4 G/DL — LOW (ref 13–17)
MCHC RBC-ENTMCNC: 30.5 PG — SIGNIFICANT CHANGE UP (ref 27–34)
MCHC RBC-ENTMCNC: 31.5 GM/DL — LOW (ref 32–36)
MCV RBC AUTO: 96.7 FL — SIGNIFICANT CHANGE UP (ref 80–100)
PLATELET # BLD AUTO: 208 K/UL — SIGNIFICANT CHANGE UP (ref 150–400)
RBC # BLD: 2.43 M/UL — LOW (ref 4.2–5.8)
RBC # FLD: 12.8 % — SIGNIFICANT CHANGE UP (ref 10.3–14.5)
SARS-COV-2 RNA SPEC QL NAA+PROBE: SIGNIFICANT CHANGE UP
WBC # BLD: 7.9 K/UL — SIGNIFICANT CHANGE UP (ref 3.8–10.5)
WBC # FLD AUTO: 7.9 K/UL — SIGNIFICANT CHANGE UP (ref 3.8–10.5)

## 2023-02-27 PROCEDURE — 99232 SBSQ HOSP IP/OBS MODERATE 35: CPT | Mod: GC

## 2023-02-27 PROCEDURE — 99233 SBSQ HOSP IP/OBS HIGH 50: CPT

## 2023-02-27 PROCEDURE — 99231 SBSQ HOSP IP/OBS SF/LOW 25: CPT

## 2023-02-27 RX ORDER — ACETAMINOPHEN 500 MG
1 TABLET ORAL
Qty: 120 | Refills: 0
Start: 2023-02-27 | End: 2023-03-28

## 2023-02-27 RX ORDER — QUETIAPINE FUMARATE 200 MG/1
1 TABLET, FILM COATED ORAL
Qty: 30 | Refills: 0
Start: 2023-02-27 | End: 2023-03-28

## 2023-02-27 RX ORDER — HYDROMORPHONE HYDROCHLORIDE 2 MG/ML
1 INJECTION INTRAMUSCULAR; INTRAVENOUS; SUBCUTANEOUS
Qty: 60 | Refills: 0
Start: 2023-02-27 | End: 2023-03-08

## 2023-02-27 RX ORDER — PHENAZOPYRIDINE HCL 100 MG
100 TABLET ORAL EVERY 8 HOURS
Refills: 0 | Status: DISCONTINUED | OUTPATIENT
Start: 2023-02-27 | End: 2023-02-28

## 2023-02-27 RX ORDER — HALOPERIDOL DECANOATE 100 MG/ML
1 INJECTION INTRAMUSCULAR
Qty: 120 | Refills: 0
Start: 2023-02-27 | End: 2023-03-28

## 2023-02-27 RX ORDER — PROCHLORPERAZINE MALEATE 5 MG
1 TABLET ORAL
Qty: 60 | Refills: 0
Start: 2023-02-27 | End: 2023-03-28

## 2023-02-27 RX ORDER — PANTOPRAZOLE SODIUM 20 MG/1
1 TABLET, DELAYED RELEASE ORAL
Qty: 60 | Refills: 0
Start: 2023-02-27 | End: 2023-03-28

## 2023-02-27 RX ORDER — QUETIAPINE FUMARATE 200 MG/1
50 TABLET, FILM COATED ORAL AT BEDTIME
Refills: 0 | Status: DISCONTINUED | OUTPATIENT
Start: 2023-02-27 | End: 2023-02-28

## 2023-02-27 RX ORDER — QUETIAPINE FUMARATE 200 MG/1
50 TABLET, FILM COATED ORAL
Refills: 0 | Status: DISCONTINUED | OUTPATIENT
Start: 2023-02-27 | End: 2023-02-28

## 2023-02-27 RX ORDER — MORPHINE SULFATE 50 MG/1
2.5 CAPSULE, EXTENDED RELEASE ORAL
Qty: 100 | Refills: 0
Start: 2023-02-27 | End: 2023-03-08

## 2023-02-27 RX ORDER — HYDROMORPHONE HYDROCHLORIDE 2 MG/ML
1 INJECTION INTRAMUSCULAR; INTRAVENOUS; SUBCUTANEOUS EVERY 4 HOURS
Refills: 0 | Status: DISCONTINUED | OUTPATIENT
Start: 2023-02-27 | End: 2023-02-28

## 2023-02-27 RX ORDER — HYDROMORPHONE HYDROCHLORIDE 2 MG/ML
0.5 INJECTION INTRAMUSCULAR; INTRAVENOUS; SUBCUTANEOUS
Refills: 0 | Status: DISCONTINUED | OUTPATIENT
Start: 2023-02-27 | End: 2023-02-28

## 2023-02-27 RX ORDER — HYOSCYAMINE SULFATE 0.13 MG
1 TABLET ORAL
Qty: 120 | Refills: 0
Start: 2023-02-27 | End: 2023-03-28

## 2023-02-27 RX ADMIN — HYDROMORPHONE HYDROCHLORIDE 4 MILLIGRAM(S): 2 INJECTION INTRAMUSCULAR; INTRAVENOUS; SUBCUTANEOUS at 11:20

## 2023-02-27 RX ADMIN — HYDROMORPHONE HYDROCHLORIDE 4 MILLIGRAM(S): 2 INJECTION INTRAMUSCULAR; INTRAVENOUS; SUBCUTANEOUS at 10:47

## 2023-02-27 RX ADMIN — HYDROMORPHONE HYDROCHLORIDE 4 MILLIGRAM(S): 2 INJECTION INTRAMUSCULAR; INTRAVENOUS; SUBCUTANEOUS at 00:10

## 2023-02-27 RX ADMIN — QUETIAPINE FUMARATE 25 MILLIGRAM(S): 200 TABLET, FILM COATED ORAL at 12:29

## 2023-02-27 RX ADMIN — HYDROMORPHONE HYDROCHLORIDE 1 MILLIGRAM(S): 2 INJECTION INTRAMUSCULAR; INTRAVENOUS; SUBCUTANEOUS at 15:40

## 2023-02-27 RX ADMIN — HYDROMORPHONE HYDROCHLORIDE 2 MILLIGRAM(S): 2 INJECTION INTRAMUSCULAR; INTRAVENOUS; SUBCUTANEOUS at 12:58

## 2023-02-27 RX ADMIN — FINASTERIDE 5 MILLIGRAM(S): 5 TABLET, FILM COATED ORAL at 10:46

## 2023-02-27 RX ADMIN — LIDOCAINE 1 PATCH: 4 CREAM TOPICAL at 10:48

## 2023-02-27 RX ADMIN — HYDROMORPHONE HYDROCHLORIDE 1 MILLIGRAM(S): 2 INJECTION INTRAMUSCULAR; INTRAVENOUS; SUBCUTANEOUS at 15:18

## 2023-02-27 RX ADMIN — HYDROMORPHONE HYDROCHLORIDE 4 MILLIGRAM(S): 2 INJECTION INTRAMUSCULAR; INTRAVENOUS; SUBCUTANEOUS at 01:08

## 2023-02-27 RX ADMIN — SENNA PLUS 2 TABLET(S): 8.6 TABLET ORAL at 21:56

## 2023-02-27 RX ADMIN — PANTOPRAZOLE SODIUM 40 MILLIGRAM(S): 20 TABLET, DELAYED RELEASE ORAL at 05:56

## 2023-02-27 RX ADMIN — QUETIAPINE FUMARATE 25 MILLIGRAM(S): 200 TABLET, FILM COATED ORAL at 00:29

## 2023-02-27 RX ADMIN — QUETIAPINE FUMARATE 50 MILLIGRAM(S): 200 TABLET, FILM COATED ORAL at 15:01

## 2023-02-27 RX ADMIN — Medication 100 MILLIGRAM(S): at 12:28

## 2023-02-27 RX ADMIN — Medication 100 MILLIGRAM(S): at 21:56

## 2023-02-27 RX ADMIN — ESCITALOPRAM OXALATE 20 MILLIGRAM(S): 10 TABLET, FILM COATED ORAL at 10:48

## 2023-02-27 RX ADMIN — Medication 100 MILLIGRAM(S): at 05:56

## 2023-02-27 RX ADMIN — AMLODIPINE BESYLATE 5 MILLIGRAM(S): 2.5 TABLET ORAL at 05:56

## 2023-02-27 RX ADMIN — HYDROMORPHONE HYDROCHLORIDE 4 MILLIGRAM(S): 2 INJECTION INTRAMUSCULAR; INTRAVENOUS; SUBCUTANEOUS at 05:56

## 2023-02-27 RX ADMIN — QUETIAPINE FUMARATE 50 MILLIGRAM(S): 200 TABLET, FILM COATED ORAL at 21:57

## 2023-02-27 RX ADMIN — HYDROMORPHONE HYDROCHLORIDE 4 MILLIGRAM(S): 2 INJECTION INTRAMUSCULAR; INTRAVENOUS; SUBCUTANEOUS at 06:45

## 2023-02-27 RX ADMIN — TAMSULOSIN HYDROCHLORIDE 0.8 MILLIGRAM(S): 0.4 CAPSULE ORAL at 21:57

## 2023-02-27 RX ADMIN — POLYETHYLENE GLYCOL 3350 17 GRAM(S): 17 POWDER, FOR SOLUTION ORAL at 10:47

## 2023-02-27 RX ADMIN — OXYMETAZOLINE HYDROCHLORIDE 1 SPRAY(S): 0.5 SPRAY NASAL at 21:58

## 2023-02-27 RX ADMIN — OXYMETAZOLINE HYDROCHLORIDE 1 SPRAY(S): 0.5 SPRAY NASAL at 10:47

## 2023-02-27 RX ADMIN — HYDROMORPHONE HYDROCHLORIDE 2 MILLIGRAM(S): 2 INJECTION INTRAMUSCULAR; INTRAVENOUS; SUBCUTANEOUS at 13:30

## 2023-02-27 RX ADMIN — Medication 10 MILLIGRAM(S): at 05:56

## 2023-02-27 NOTE — BH CONSULTATION LIAISON PROGRESS NOTE - NSBHCHARTREVIEWINVESTIGATE_PSY_A_CORE FT
Ventricular Rate 92 BPM    Atrial Rate 92 BPM    P-R Interval 144 ms    QRS Duration 76 ms    Q-T Interval 338 ms    QTC Calculation(Bazett) 417 ms    P Axis 69 degrees    R Axis 62 degrees    T Axis 63 degrees    Diagnosis Line Normal sinus rhythm  Normal ECG  No previous ECGs available  Confirmed by Jerome Gould ([id number]) on 2/21/2023 12:53:13 PM  

## 2023-02-27 NOTE — PROGRESS NOTE ADULT - ASSESSMENT
88M w/PMH BPH, Knee pain, Parkinson's disease w/ dementia, HTN, AVR, presents BIB wife from home c/o FTT, decreased po intake over past several days and not walking much d/t knee pain, feet pain.  Pt is AAOx2 (PP), fears food will end up in back of his nose if he eats, In ED, wbc 10, UA neg, cxr neg, Na 134, Cr 1.6 (prior 1.1), HR 90s, given IVF, morphine x1. Pt transitioned to hospice care.     ****On comfort care (molst updated 2/24)      #FTT w/ poor po intake in setting of advanced dementia   - nutrition recs appreciated  - supportive care.  - afrin for nasal congestion  - aspiration precautions  - wife desires comfort care- now agreeable to inpatient hospice as patient pain not controlled with po dilaudid   - will patient keeps tugging at alford - will discontinue alford and straight cath q8hr   - bedbound, minimally verbal   - PT  - dilaudid 1mg IVP q4 prn pain   - dilaudid 0.5 mg IVP q3 prn pain        #acute GI bleed developed after the admission   -Hgb 11.9 -> 7.8 -> 6.3 within ~24hrs; now stable in 7s   -hx of celebrex use   -s/p 2 units pRBC  -transfuse if Hgb <7  -s/p EGD showing no further bleeding   -GI rec appreciated      - No Nsaids    - Advance to Regular diet    - PPI PO BID    - Office visit after discharge     #urinary retention  -alford with hematuria 2/2 alford trauma   -urology rec appreciated     - Continue Flomax and Finasteride     - Pt can follow up with Urologists in [place] (Dr. Chery/Chavez/Delvis) or Follow up with Dr. Randy Wilson (located in [facility] [phone]) for further management      #Delirium w/ dementia- no e/o infection, possibly d/t dehydration, progression of dementia  #Parkinson's dementia  - psych rec appreciated:     - escitalopram 20mg qd      - increased seroquel to 25mg qd     - increased seroquel to 25mg qhs      - melatonin 3mg qhs      - ondansetron 4mg IV 8hrs PRN for n/v  - outpt f/u w/ his neurologist for eval of PD, dementia  - possibly related to recent edibles- d/w wife    #Chronic Rt knee pain w/ increased swelling of RLE  - RLE doppler with no evidence of dvt   - c/w lido patch, iv dilaudid, ice to knee  - f/u PT eval   - hold tizandine, celebrex home doses  -pt now on hospice care:     #Feet pain- possibly peripheral neuropathy  - b12, folate nl     #HTN- bp stable  - c/w pt's dose of hydralazine, amlodipine    #MAYRA 2/2 likely dehydration - resolved  #hyponatremia, mild  - resolved   - s/p NS @80cc/hr    #PPX- hold lovenox as pt now hospice care     Dispo:   -Now for inpatient hospice by wife request. [facility]   -palliative following    Discussed with Dr. Henley

## 2023-02-27 NOTE — PROGRESS NOTE ADULT - REASON FOR ADMISSION
FTT, pain in RLE

## 2023-02-27 NOTE — BH CONSULTATION LIAISON PROGRESS NOTE - CURRENT MEDICATION
MEDICATIONS  (STANDING):  amLODIPine   Tablet 5 milliGRAM(s) Oral daily  atorvastatin 10 milliGRAM(s) Oral at bedtime  escitalopram 20 milliGRAM(s) Oral daily  finasteride 5 milliGRAM(s) Oral daily  hydrALAZINE 10 milliGRAM(s) Oral two times a day  lidocaine   4% Patch 1 Patch Transdermal daily  naloxone Injectable 0.4 milliGRAM(s) IV Push once  pantoprazole    Tablet 40 milliGRAM(s) Oral two times a day  polyethylene glycol 3350 17 Gram(s) Oral daily  QUEtiapine 12.5 milliGRAM(s) Oral <User Schedule>  QUEtiapine 12.5 milliGRAM(s) Oral at bedtime  senna 2 Tablet(s) Oral at bedtime  tamsulosin 0.8 milliGRAM(s) Oral at bedtime    MEDICATIONS  (PRN):  acetaminophen     Tablet .. 650 milliGRAM(s) Oral every 6 hours PRN Temp greater or equal to 38C (100.4F), Mild Pain (1 - 3)  aluminum hydroxide/magnesium hydroxide/simethicone Suspension 30 milliLiter(s) Oral every 4 hours PRN Dyspepsia  bisacodyl 5 milliGRAM(s) Oral daily PRN Constipation  HYDROmorphone  Injectable 0.25 milliGRAM(s) IV Push every 4 hours PRN Moderate Pain (4 - 6)  HYDROmorphone  Injectable 0.5 milliGRAM(s) IV Push every 4 hours PRN Severe Pain (7 - 10)  melatonin 3 milliGRAM(s) Oral at bedtime PRN Insomnia  ondansetron Injectable 4 milliGRAM(s) IV Push every 8 hours PRN Nausea and/or Vomiting  
MEDICATIONS  (STANDING):  amLODIPine   Tablet 5 milliGRAM(s) Oral daily  atorvastatin 10 milliGRAM(s) Oral at bedtime  escitalopram 20 milliGRAM(s) Oral daily  finasteride 5 milliGRAM(s) Oral daily  hydrALAZINE 10 milliGRAM(s) Oral two times a day  lidocaine   4% Patch 1 Patch Transdermal daily  naloxone Injectable 0.4 milliGRAM(s) IV Push once  oxymetazoline 0.05% Nasal Spray 1 Spray(s) Both Nostrils two times a day  pantoprazole    Tablet 40 milliGRAM(s) Oral two times a day  phenazopyridine 100 milliGRAM(s) Oral every 8 hours  polyethylene glycol 3350 17 Gram(s) Oral daily  QUEtiapine 50 milliGRAM(s) Oral at bedtime  QUEtiapine 50 milliGRAM(s) Oral <User Schedule>  senna 2 Tablet(s) Oral at bedtime  tamsulosin 0.8 milliGRAM(s) Oral at bedtime    MEDICATIONS  (PRN):  acetaminophen     Tablet .. 650 milliGRAM(s) Oral every 6 hours PRN Temp greater or equal to 38C (100.4F), Mild Pain (1 - 3)  aluminum hydroxide/magnesium hydroxide/simethicone Suspension 30 milliLiter(s) Oral every 4 hours PRN Dyspepsia  bisacodyl 5 milliGRAM(s) Oral daily PRN Constipation  HYDROmorphone   Tablet 4 milliGRAM(s) Oral every 4 hours PRN Severe Pain (7 - 10)  HYDROmorphone   Tablet 2 milliGRAM(s) Oral every 4 hours PRN Moderate Pain (4 - 6)  melatonin 3 milliGRAM(s) Oral at bedtime PRN Insomnia  ondansetron Injectable 4 milliGRAM(s) IV Push every 8 hours PRN Nausea and/or Vomiting  QUEtiapine 25 milliGRAM(s) Oral two times a day PRN agitation  
MEDICATIONS  (STANDING):  amLODIPine   Tablet 5 milliGRAM(s) Oral daily  atorvastatin 10 milliGRAM(s) Oral at bedtime  escitalopram 20 milliGRAM(s) Oral daily  finasteride 5 milliGRAM(s) Oral daily  hydrALAZINE 10 milliGRAM(s) Oral two times a day  lidocaine   4% Patch 1 Patch Transdermal daily  naloxone Injectable 0.4 milliGRAM(s) IV Push once  pantoprazole  Injectable 40 milliGRAM(s) IV Push two times a day  polyethylene glycol 3350 17 Gram(s) Oral daily  QUEtiapine 12.5 milliGRAM(s) Oral <User Schedule>  QUEtiapine 12.5 milliGRAM(s) Oral at bedtime  senna 2 Tablet(s) Oral at bedtime  tamsulosin 0.8 milliGRAM(s) Oral at bedtime    MEDICATIONS  (PRN):  acetaminophen     Tablet .. 650 milliGRAM(s) Oral every 6 hours PRN Temp greater or equal to 38C (100.4F), Mild Pain (1 - 3)  aluminum hydroxide/magnesium hydroxide/simethicone Suspension 30 milliLiter(s) Oral every 4 hours PRN Dyspepsia  bisacodyl 5 milliGRAM(s) Oral daily PRN Constipation  HYDROmorphone  Injectable 0.25 milliGRAM(s) IV Push every 4 hours PRN Moderate Pain (4 - 6)  HYDROmorphone  Injectable 0.5 milliGRAM(s) IV Push every 4 hours PRN Severe Pain (7 - 10)  melatonin 3 milliGRAM(s) Oral at bedtime PRN Insomnia  ondansetron Injectable 4 milliGRAM(s) IV Push every 8 hours PRN Nausea and/or Vomiting  
MEDICATIONS  (STANDING):  amLODIPine   Tablet 5 milliGRAM(s) Oral daily  atorvastatin 10 milliGRAM(s) Oral at bedtime  escitalopram 20 milliGRAM(s) Oral daily  finasteride 5 milliGRAM(s) Oral daily  hydrALAZINE 10 milliGRAM(s) Oral two times a day  lidocaine   4% Patch 1 Patch Transdermal daily  naloxone Injectable 0.4 milliGRAM(s) IV Push once  pantoprazole    Tablet 40 milliGRAM(s) Oral two times a day  polyethylene glycol 3350 17 Gram(s) Oral daily  QUEtiapine 25 milliGRAM(s) Oral at bedtime  QUEtiapine 25 milliGRAM(s) Oral <User Schedule>  senna 2 Tablet(s) Oral at bedtime  tamsulosin 0.8 milliGRAM(s) Oral at bedtime    MEDICATIONS  (PRN):  acetaminophen     Tablet .. 650 milliGRAM(s) Oral every 6 hours PRN Temp greater or equal to 38C (100.4F), Mild Pain (1 - 3)  aluminum hydroxide/magnesium hydroxide/simethicone Suspension 30 milliLiter(s) Oral every 4 hours PRN Dyspepsia  bisacodyl 5 milliGRAM(s) Oral daily PRN Constipation  HYDROmorphone   Tablet 2 milliGRAM(s) Oral every 4 hours PRN Moderate Pain (4 - 6)  HYDROmorphone   Tablet 4 milliGRAM(s) Oral every 4 hours PRN Severe Pain (7 - 10)  HYDROmorphone  Injectable 0.25 milliGRAM(s) IV Push every 4 hours PRN Moderate Pain (4 - 6)  HYDROmorphone  Injectable 0.5 milliGRAM(s) IV Push every 4 hours PRN Severe Pain (7 - 10)  melatonin 3 milliGRAM(s) Oral at bedtime PRN Insomnia  ondansetron Injectable 4 milliGRAM(s) IV Push every 8 hours PRN Nausea and/or Vomiting  QUEtiapine 25 milliGRAM(s) Oral two times a day PRN agitation

## 2023-02-27 NOTE — BH CONSULTATION LIAISON PROGRESS NOTE - NSBHATTESTBILLING_PSY_A_CORE
98716-[id number] - low complexity OR 20-29 mins
62714-[id number] - low complexity OR 20-29 mins
00811-[id number] - low complexity OR 20-29 mins
37651-[id number] - low complexity OR 20-29 mins

## 2023-02-27 NOTE — BH CONSULTATION LIAISON PROGRESS NOTE - NSBHMSESPABN_PSY_A_CORE
Soft volume/Slowed rate/Increased latency
Soft volume/Slowed rate

## 2023-02-27 NOTE — BH CONSULTATION LIAISON PROGRESS NOTE - NSBHMSETHTPROC_PSY_A_CORE
Illogical/Impaired reasoning

## 2023-02-27 NOTE — PROGRESS NOTE ADULT - SUBJECTIVE AND OBJECTIVE BOX
HPI:  88M w/ PMH BPH, Knee pain, Parkinson's disease w/ dementia, HTN, AVR, presents BIB wife from home c/o Failure to thrive, decreased po intake over past several days and not walking much d/t knee pain, feet pain.  Pt is AAOx2 (PP), hx obtained from wife at bedside.  She endorses that pt normally ambulatory but over past several days, he's been more WC and Bedbound.  Wife also endorses that pt not eating as he c/o fear of food will go up his nose and has been having sensation of diaphoresis at night, although he's not sweating.  She's started giving pt "edibles" hoping that it will help calm him down but not much relief from pain.  She denies any notable fever, n/v/d, abd pain, sob, cough.  Pt can confirm that he doesn't want to eat out of fear of getting food in the back of his nose and tells me that his Rt knee and bottom of his feet hurt.  In ED, wbc 10, UA neg, cxr neg, Na 134, Cr 1.6 (prior 1.1), HR 90s, given IVF, morphine x1.     Subjective:   2/27: Pt seen and evaluated by bedside in AM. Resting comfortably, wanted juice. wrist restraints in place. Alford draining orange urine from pyridium.     REVIEW OF SYSTEMS:  unable to assess due to dementia     =======================================================  Subjective:     Vital Signs Last 24 Hrs  T(C): 37.1 (27 Feb 2023 15:18), Max: 37.3 (27 Feb 2023 07:36)  T(F): 98.7 (27 Feb 2023 15:18), Max: 99.1 (27 Feb 2023 07:36)  HR: 106 (27 Feb 2023 15:18) (102 - 108)  BP: 129/68 (27 Feb 2023 15:18) (103/68 - 129/68)  RR: 18 (27 Feb 2023 15:18) (18 - 18)  SpO2: 95% (27 Feb 2023 15:18) (95% - 97%)      PHYSICAL EXAM:  Constitutional: Pt lying in bed, awake and alert, NAD  HEENT: EOMI, normal hearing, dry mucous membranes  Neck: Soft and supple, no JVD  Respiratory: CTABL, No wheezing, rales or rhonchi  Cardiovascular: S1S2+, RRR, systolic murmur  Gastrointestinal: BS+, soft, NT/ND, no guarding, no rebound  : alford  Extremities: calf swollen b/l  and tender to palpation, b/l knee swelling and pain   Vascular: 2+ peripheral pulses  Neurological: AAOx2  Skin: No rashes      =======================================================  Labs: all labs reviewed     =======================================================  MEDICATIONS  (STANDING):  escitalopram 20 milliGRAM(s) Oral daily  finasteride 5 milliGRAM(s) Oral daily  hydrALAZINE 10 milliGRAM(s) Oral two times a day  lidocaine   4% Patch 1 Patch Transdermal daily  naloxone Injectable 0.4 milliGRAM(s) IV Push once  oxymetazoline 0.05% Nasal Spray 1 Spray(s) Both Nostrils two times a day  phenazopyridine 100 milliGRAM(s) Oral every 8 hours  polyethylene glycol 3350 17 Gram(s) Oral daily  QUEtiapine 50 milliGRAM(s) Oral at bedtime  QUEtiapine 50 milliGRAM(s) Oral <User Schedule>  senna 2 Tablet(s) Oral at bedtime  tamsulosin 0.8 milliGRAM(s) Oral at bedtime    MEDICATIONS  (PRN):  acetaminophen     Tablet .. 650 milliGRAM(s) Oral every 6 hours PRN Temp greater or equal to 38C (100.4F), Mild Pain (1 - 3)  aluminum hydroxide/magnesium hydroxide/simethicone Suspension 30 milliLiter(s) Oral every 4 hours PRN Dyspepsia  bisacodyl 5 milliGRAM(s) Oral daily PRN Constipation  HYDROmorphone  Injectable 1 milliGRAM(s) IV Push every 4 hours PRN Severe Pain (7 - 10)  HYDROmorphone  Injectable 0.5 milliGRAM(s) IV Push every 3 hours PRN Moderate Pain (4 - 6)  LORazepam   Injectable 0.5 milliGRAM(s) IV Push every 4 hours PRN Agitation  melatonin 3 milliGRAM(s) Oral at bedtime PRN Insomnia  ondansetron Injectable 4 milliGRAM(s) IV Push every 8 hours PRN Nausea and/or Vomiting  QUEtiapine 25 milliGRAM(s) Oral two times a day PRN agitation

## 2023-02-27 NOTE — BH CONSULTATION LIAISON PROGRESS NOTE - NSBHCHARTREVIEWLAB_PSY_A_CORE FT
7.6    7.78  )-----------( 145      ( 22 Feb 2023 06:33 )             23.2   02-22    140  |  111<H>  |  38<H>  ----------------------------<  93  4.3   |  24  |  1.27    Ca    8.8      22 Feb 2023 06:33    
                      7.6    7.78  )-----------( 145      ( 22 Feb 2023 06:33 )             23.2   02-22    140  |  111<H>  |  38<H>  ----------------------------<  93  4.3   |  24  |  1.27    Ca    8.8      22 Feb 2023 06:33    
CBC Full  -  ( 27 Feb 2023 06:23 )  WBC Count : 7.90 K/uL  RBC Count : 2.43 M/uL  Hemoglobin : 7.4 g/dL  Hematocrit : 23.5 %  Platelet Count - Automated : 208 K/uL  Mean Cell Volume : 96.7 fl  Mean Cell Hemoglobin : 30.5 pg  Mean Cell Hemoglobin Concentration : 31.5 gm/dL  Auto Neutrophil # : x  Auto Lymphocyte # : x  Auto Monocyte # : x  Auto Eosinophil # : x  Auto Basophil # : x  Auto Neutrophil % : x  Auto Lymphocyte % : x  Auto Monocyte % : x  Auto Eosinophil % : x  Auto Basophil % : x  
                      7.6    7.78  )-----------( 145      ( 22 Feb 2023 06:33 )             23.2   02-22    140  |  111<H>  |  38<H>  ----------------------------<  93  4.3   |  24  |  1.27    Ca    8.8      22 Feb 2023 06:33

## 2023-02-27 NOTE — BH CONSULTATION LIAISON PROGRESS NOTE - NSBHCONSULTFOLLOWAFTERCARE_PSY_A_CORE FT
Follow up with outpatient providers

## 2023-02-27 NOTE — PROGRESS NOTE ADULT - NUTRITIONAL ASSESSMENT
Script sent to [facility] [place] per patient request.  Patient notified.  
This patient has been assessed with a concern for Malnutrition and has been determined to have a diagnosis/diagnoses of Severe protein-calorie malnutrition.    This patient is being managed with:   Diet Regular-  Entered: Feb 23 2023  9:21AM    
This patient has been assessed with a concern for Malnutrition and has been determined to have a diagnosis/diagnoses of Severe protein-calorie malnutrition.    This patient is being managed with:   Diet Full Liquid-  Entered: Feb 22 2023  5:16PM    
This patient has been assessed with a concern for Malnutrition and has been determined to have a diagnosis/diagnoses of Severe protein-calorie malnutrition.    This patient is being managed with:   Diet Regular-  Entered: Feb 23 2023  9:21AM    
This patient has been assessed with a concern for Malnutrition and has been determined to have a diagnosis/diagnoses of Severe protein-calorie malnutrition.    This patient is being managed with:   Diet Regular-  Entered: Feb 23 2023  9:21AM    
This patient has been assessed with a concern for Malnutrition and has been determined to have a diagnosis/diagnoses of Severe protein-calorie malnutrition.    This patient is being managed with:   Diet Full Liquid-  Entered: Feb 21 2023  4:49PM    Diet NPO after Midnight-     NPO Start Date: 21-Feb-2023   NPO Start Time: 23:59  Entered: Feb 21 2023  4:49PM    
This patient has been assessed with a concern for Malnutrition and has been determined to have a diagnosis/diagnoses of Severe protein-calorie malnutrition.    This patient is being managed with:   Diet Regular-  Entered: Feb 23 2023  9:21AM

## 2023-02-27 NOTE — BH CONSULTATION LIAISON PROGRESS NOTE - NSBHCHARTREVIEWVS_PSY_A_CORE FT
Vital Signs Last 24 Hrs  T(C): 36.7 (23 Feb 2023 08:08), Max: 37.3 (22 Feb 2023 17:50)  T(F): 98 (23 Feb 2023 08:08), Max: 99.1 (22 Feb 2023 17:50)  HR: 121 (23 Feb 2023 08:08) (90 - 121)  BP: 138/74 (23 Feb 2023 08:08) (117/93 - 142/73)  BP(mean): --  RR: 18 (23 Feb 2023 08:08) (17 - 18)  SpO2: 95% (23 Feb 2023 08:08) (93% - 98%)    Parameters below as of 23 Feb 2023 08:08  Patient On (Oxygen Delivery Method): room air    
Vital Signs Last 24 Hrs  T(C): 37 (22 Feb 2023 09:54), Max: 37.1 (21 Feb 2023 21:24)  T(F): 98.6 (22 Feb 2023 09:54), Max: 98.8 (21 Feb 2023 21:24)  HR: 95 (22 Feb 2023 09:54) (84 - 103)  BP: 102/58 (22 Feb 2023 09:54) (102/58 - 151/76)  BP(mean): --  RR: 18 (22 Feb 2023 09:54) (16 - 20)  SpO2: 97% (22 Feb 2023 09:54) (97% - 99%)    Parameters below as of 22 Feb 2023 09:54  Patient On (Oxygen Delivery Method): room air    
Vital Signs Last 24 Hrs  T(C): 37.3 (27 Feb 2023 07:36), Max: 37.3 (26 Feb 2023 16:21)  T(F): 99.1 (27 Feb 2023 07:36), Max: 99.1 (26 Feb 2023 16:21)  HR: 108 (27 Feb 2023 07:36) (102 - 108)  BP: 124/55 (27 Feb 2023 07:36) (103/68 - 126/69)  BP(mean): --  RR: 18 (27 Feb 2023 07:36) (18 - 18)  SpO2: 96% (27 Feb 2023 07:36) (96% - 97%)    Parameters below as of 27 Feb 2023 07:36  Patient On (Oxygen Delivery Method): room air    
Vital Signs Last 24 Hrs  T(C): 36.7 (24 Feb 2023 07:52), Max: 37.1 (23 Feb 2023 15:11)  T(F): 98.1 (24 Feb 2023 07:52), Max: 98.8 (23 Feb 2023 15:11)  HR: 91 (24 Feb 2023 07:52) (91 - 113)  BP: 106/49 (24 Feb 2023 07:52) (100/79 - 140/75)  BP(mean): 95 (24 Feb 2023 06:32) (95 - 95)  RR: 17 (24 Feb 2023 07:52) (17 - 18)  SpO2: 99% (24 Feb 2023 07:52) (96% - 99%)    Parameters below as of 24 Feb 2023 07:52  Patient On (Oxygen Delivery Method): room air

## 2023-02-27 NOTE — PROGRESS NOTE ADULT - ASSESSMENT
HPI: Pt is a 88y old Male with a PMH of  BPH, Knee pain, Parkinson's disease w/ dementia, HTN, AVR, presents BIB wife from home c/o FTT, decreased po intake over past several days and not walking much d/t knee pain, feet pain.  Pt is AAOx2 (PP), Wife endorses that pt normally ambulatory but over past several days, he's been more WC and Bedbound.  Wife also endorses that pt not eating as he c/o fear of food will go up his nose and has been having sensation of diaphoresis at night, although he's not sweating.  She's started giving pt "edibles" hoping that it will help calm him down but not much relief from pain.  She denies any notable fever, n/v/d, abd pain, sob, cough.  Pt can confirm that he doesn't want to eat out of fear of getting food in the back of his nose and states that his Rt knee and bottom of his feet hurt. . Palliative medicine  Consult to further establish GOC     1/24/23 Seen and examined at bedside. Attempting to get OOB with PT at the time of my visit. Requires max assistance and he states he can not stand and has pain RLE. Seated in chair. Wife at bedside       Assessment and Plan:    1) GI bleed   -developed after the admission   -Hgb 11.9 -> 7.8 -> 6.3 within ~24hrs  -hx of celebrex use   -s/p 2 units PRBC  -transfuse as per medicin  -serial h/h   -Cardio rec appreciated: no cardiac contraindication for EGD   -s/p EGD showing no further bleeding   -GI rec appreciated   -Avoids NSAIDS  -Diet as tolerated  -PPI as ordered       2) Urinary retention  -Sherman in place  -Void trial  -Urology eval noted  -Follow up out pt    3) Debility  -Dementia with behavioral disturbances  -Cont Seroquel as ordered  -Add Ativan 0.5 mg IVP Q4H PRN agitation  -Poor PO intake  -Poor mobility  -PT eval    4) Pain  -Cont Dilaudid 2 mg PO Q4H PRN mod pain  -Cont Dilaudid 4 mg PO Q4H PRN severe pain  -If unable to tolerate PO  -Add Dilaudid 0.5 mg IVP Q3H PRN mod pain  -Add Dilaudid 1 mg IVP Q3H PRN severe pain    5) Dementia  - behavioral disturbance  -Cont Seroquel  -Pain management     6) Advanced Directives  -Pt without capacity  -Wife Kalani surrogate  -Will schedule GOC discussion

## 2023-02-27 NOTE — PROGRESS NOTE ADULT - SUBJECTIVE AND OBJECTIVE BOX
HPI: Pt is a 88y old Male with a PMH of  BPH, Knee pain, Parkinson's disease w/ dementia, HTN, AVR, presents BIB wife from home c/o FTT, decreased po intake over past several days and not walking much d/t knee pain, feet pain.  Pt is AAOx2 (PP), Wife endorses that pt normally ambulatory but over past several days, he's been more WC and Bedbound.  Wife also endorses that pt not eating as he c/o fear of food will go up his nose and has been having sensation of diaphoresis at night, although he's not sweating.  She's started giving pt "edibles" hoping that it will help calm him down but not much relief from pain.  She denies any notable fever, n/v/d, abd pain, sob, cough.  Pt can confirm that he doesn't want to eat out of fear of getting food in the back of his nose and states that his Rt knee and bottom of his feet hurt. . Palliative medicine  Consult to further establish GOC     1/24/23 Seen and examined at bedside. Attempting to get OOB with PT at the time of my visit. Requires max assistance and he states he can not stand and has pain RLE. Seated in chair. Wife at bedside   1/27/23 Seen and examined at bedside with no family present. Pt agitated and pulled out alford cath overnight. As the day progress RN reports pain unrelieved with oral Dilaudid as well as increased agitation. Restraints maintained for safety.     PAIN: (X )Yes   ( )No  Level: mod-severe  Location: Right lower ext  Intensity:   unable to quantify  Impact on ADLs: severe    DYSPNEA: ( ) Yes  ( X) No  Level:    PAST MEDICAL & SURGICAL HISTORY:  Hypertension  BPH (benign prostatic hyperplasia)  High cholesterol  Chronic pain  s/p knee and hip replacements  Mild CAD  Non Obstructive CAD  S/P AVR (aortic valve replacement)  Anemia  Dementia  History of total right knee replacement (TKR)  X2  History of total hip replacement, right  S/P AVR (aortic valve replacement)    SOCIAL HX:  Lives with wife and aide support  Hx opiate tolerance ( )YES  (X )NO    Baseline ADLs  (Prior to Admission)  ( ) Independent   (X )Dependent    FAMILY HISTORY:  Unable to obtain due to dementia    Review of Systems:    Unable to obtain/Limited due to: dementia      PHYSICAL EXAM:  ICU Vital Signs Last 24 Hrs  T(C): 37.3 (27 Feb 2023 07:36), Max: 37.3 (26 Feb 2023 16:21)  T(F): 99.1 (27 Feb 2023 07:36), Max: 99.1 (26 Feb 2023 16:21)  HR: 108 (27 Feb 2023 07:36) (102 - 108)  BP: 124/55 (27 Feb 2023 07:36) (103/68 - 126/69)  RR: 18 (27 Feb 2023 07:36) (18 - 18)  SpO2: 96% (27 Feb 2023 07:36) (96% - 97%)    O2 Parameters below as of 27 Feb 2023 07:36  Patient On (Oxygen Delivery Method): room air    PPSV2: 40 %  FAST: 7    General: Frail elderly male in bed in no acute distress  Mental Status: A&O X2  HEENT: oral mucosa dry  Lungs: clear diminished javad  Cardiac: S1S2+  GI: abd soft NT ND + BS  : voids  Ext: strength  Neuro: dementia      LABS:                                7.4    7.90  )-----------( 208      ( 27 Feb 2023 06:23 )             23.5        Allergies    oxycodone (Other; Flushing)  tramadol (Other (Mod to Severe); Flushing)    Intolerances    MEDICATIONS  (STANDING):  amLODIPine   Tablet 5 milliGRAM(s) Oral daily  atorvastatin 10 milliGRAM(s) Oral at bedtime  escitalopram 20 milliGRAM(s) Oral daily  finasteride 5 milliGRAM(s) Oral daily  hydrALAZINE 10 milliGRAM(s) Oral two times a day  lidocaine   4% Patch 1 Patch Transdermal daily  naloxone Injectable 0.4 milliGRAM(s) IV Push once  oxymetazoline 0.05% Nasal Spray 1 Spray(s) Both Nostrils two times a day  pantoprazole    Tablet 40 milliGRAM(s) Oral two times a day  phenazopyridine 100 milliGRAM(s) Oral every 8 hours  polyethylene glycol 3350 17 Gram(s) Oral daily  QUEtiapine 50 milliGRAM(s) Oral at bedtime  QUEtiapine 50 milliGRAM(s) Oral <User Schedule>  senna 2 Tablet(s) Oral at bedtime  tamsulosin 0.8 milliGRAM(s) Oral at bedtime    MEDICATIONS  (PRN):  acetaminophen     Tablet .. 650 milliGRAM(s) Oral every 6 hours PRN Temp greater or equal to 38C (100.4F), Mild Pain (1 - 3)  aluminum hydroxide/magnesium hydroxide/simethicone Suspension 30 milliLiter(s) Oral every 4 hours PRN Dyspepsia  bisacodyl 5 milliGRAM(s) Oral daily PRN Constipation  HYDROmorphone   Tablet 2 milliGRAM(s) Oral every 4 hours PRN Moderate Pain (4 - 6)  HYDROmorphone   Tablet 4 milliGRAM(s) Oral every 4 hours PRN Severe Pain (7 - 10)  HYDROmorphone  Injectable 0.5 milliGRAM(s) IV Push every 3 hours PRN Moderate Pain (4 - 6)  HYDROmorphone  Injectable 1 milliGRAM(s) IV Push every 4 hours PRN Severe Pain (7 - 10)  LORazepam   Injectable 0.5 milliGRAM(s) IV Push every 4 hours PRN Agitation  melatonin 3 milliGRAM(s) Oral at bedtime PRN Insomnia  ondansetron Injectable 4 milliGRAM(s) IV Push every 8 hours PRN Nausea and/or Vomiting  QUEtiapine 25 milliGRAM(s) Oral two times a day PRN agitation      RADIOLOGY/ADDITIONAL STUDIES:

## 2023-02-27 NOTE — PROGRESS NOTE ADULT - PROVIDER SPECIALTY LIST ADULT
Family Medicine
Gastroenterology
Hospitalist
Family Medicine
Family Medicine
Hospitalist
Hospitalist
Palliative Care
Family Medicine
Hospitalist
Hospitalist
Family Medicine

## 2023-02-27 NOTE — BH CONSULTATION LIAISON PROGRESS NOTE - NSBHATTESTTYPEVISIT_PSY_A_CORE
On-site Attending supervising SARI (99XXX codes)
Attending Only
On-site Attending supervising SARI (99XXX codes)
Attending Only

## 2023-02-27 NOTE — BH CONSULTATION LIAISON PROGRESS NOTE - NSBHMSEAFFQUAL_PSY_A_CORE
Pt calling to receive results from colonoscopy dated 06/03        Please call pt to discuss.  
Anxious
Euthymic

## 2023-02-27 NOTE — CHART NOTE - NSCHARTNOTEFT_GEN_A_CORE
Called that pt having dark tarry stool and drop in hemoglobin from 11.9 to 7.8. VSS. Ordered stat type and screen, FOBT. Will give one unit of blood. Pt made NPO, started IV PPI and GI consult.
Patient has a mobility limitation that significantly impairs the pts ability to participate in one or more MRADLs such as toileting, eating, dressing, and bathing in customary locations in the home. The patients home provides adequate access between rooms for the use of the transport wheelchair with foot rest. The wheelchair will significantly improve the patients ability to participate in MRADLs and will be used on a regular basis at home. The pts mobility limitation cannot be resolved by the use of a walker or cane. The patient has family that is able and willing to transport patient in wheelchair.
This SW spoke with pts wife to follow up and provide support. We reviewed plan again. Pt. had been agitated and required restraints however, team is working toward adjusting oral meds to further help with symptoms. As long as pts symptoms are managed plan is still for home hospice with [facility]. Pts wife is aware if pt. needed IV symptom/pain management at any time plan could be changed to inpatient, even if pt. were to go home he can always go to inpatient if needed. Pts wife expressed understanding. Plan is still for home hospice at this time. Pt. has aide at home. Emotional support provided. Our team will continue to follow.
Pt pulled out his alford and there was gross bleeding noticed at the urethral meatus. No superficial laceration noticed. Re-inserted alford without complication. Alford draining urine without clots and is pinkish in hue. If there is extensive gross hematuria and/or hemodynamic instability, will order stat H+H
This 87 yo male who will require a hospital bed for discharge to home in order to keep patients head elevated greater than 30 degrees due to multiple medical issues including:     Pt is not able to make position changes without assistance.   Patient is at high risk for aspiration due to multiple medical problems.  Patient is bedbound and cannot make position changes. Pillows and wedges have been tried and failed. Patient is at risk for skin breakdown.    Patient requires positioning of the body in ways not feasible with an ordinary bed.  Patient will require half rail with a gel overlay to protect skin integrity and prevent skin breakdown.  Patient requires frequent repositioning in order to alleviate pain.

## 2023-02-27 NOTE — BH CONSULTATION LIAISON PROGRESS NOTE - NSBHFUPINTERVALHXFT_PSY_A_CORE
NO agitated behavior. NO suicidal pr homicidal  behavior. No aggression.   Pt wife Kasie is at the bedside,. pt has hx of dementia.   Pt continues to be confused, cannot say his age or current  year. 
NO suicidal pr homicidal  behavior. No aggression.   Patient continues to be incoherent but not agitated.  This seems to be his baseline.
NO suicidal pr homicidal  behavior. No aggression.   Patient continues to be incoherent but not agitated with others. However be started pulling at his Sherman Cath during a period of agitation, it was removed by RN. This seems to be his baseline.
NO suicidal or homicidal behavior. No aggression. Patient continues to be incoherent but not agitated with others. However be started pulling at his Sherman Cath during a period of agitation, it was removed by RN, patient w/o Sherman on Friday however could not void on own, Sherman placed again. Patient has had agitation and pulls at Sherman throughout the day.

## 2023-02-27 NOTE — PROGRESS NOTE ADULT - SUBJECTIVE AND OBJECTIVE BOX
Pt has been seen and examined with FP resident, resident supervised agree with a/p       Patient is a 88y old  Male who presents with a chief complaint of FTT, pain in RLE (21 Feb 2023 19:58)      HPI:  HPI:  88M w/PMH BPH, Knee pain, Parkinson's disease w/ dementia, HTN, AVR, presents BIB wife from home c/o FTT, decreased po intake over past several days and not walking much d/t knee pain, feet pain.          PHYSICAL EXAM:      -rs-aeeb, cta  -cvs-s1s2 normal   -p/a-soft,bs+      A/P    #d/c plan for today and d/c today if no social issue, time spent 45 minutes

## 2023-02-27 NOTE — BH CONSULTATION LIAISON PROGRESS NOTE - NSBHASSESSMENTFT_PSY_ALL_CORE
Patient is a 87 y/o male, , has adult children, retired, domiciled in private home with wife. No formal PPHx, on Lexapro by PCP. No known suicidal ideation/HI. No inpatient hospitalizations. PMHx of Knee pain, Parkinson's disease w/ dementia, HTN, AVR, presents BIB wife from home c/o FTT. Psychiatry consulted for dementia & hallucinations.   Pt is at baseline, confused and incoherent but not agitated.     Plan:  continue current treatment:  escitalopram 20 milliGRAM(s) Oral daily  Increase QUEtiapine 25 milliGRAM(s) Oral <User Schedule>  Increase QUEtiapine 25 milliGRAM(s) Oral at bedtime  MEDICATIONS  (PRN):  melatonin 3 milliGRAM(s) Oral at bedtime PRN Insomnia  ondansetron Injectable 4 milliGRAM(s) IV Push every 8 hours PRN Nausea and/or Vomiting  Seroquel 25 mg BID for agitation 
Patient is a 87 y/o male, , has adult children, retired, domiciled in private home with wife. No formal PPHx, on Lexapro by PCP. No known suicidal ideation/HI. No inpatient hospitalizations. PMHx of Knee pain, Parkinson's disease w/ dementia, HTN, AVR, presents BIB wife from home c/o FTT. Psychiatry consulted for dementia & hallucinations.   Collateral from Kalani: Pt came in for pain, still c/o pain.      Plan:  continue current treatment:  escitalopram 20 milliGRAM(s) Oral daily  QUEtiapine 12.5 milliGRAM(s) Oral <User Schedule>  QUEtiapine 12.5 milliGRAM(s) Oral at bedtime  MEDICATIONS  (PRN):  melatonin 3 milliGRAM(s) Oral at bedtime PRN Insomnia  ondansetron Injectable 4 milliGRAM(s) IV Push every 8 hours PRN Nausea and/or Vomiting  
Patient is a 87 y/o male, , has adult children, retired, domiciled in private home with wife. No formal PPHx, on Lexapro by PCP. No known suicidal ideation/HI. No inpatient hospitalizations. PMHx of Knee pain, Parkinson's disease w/ dementia, HTN, AVR, presents BIB wife from home c/o FTT. Psychiatry consulted for dementia & hallucinations.   Pt is at baseline, confused and incoherent but not agitated.     Plan:  continue current treatment:  escitalopram 20 milliGRAM(s) Oral daily  QUEtiapine 12.5 milliGRAM(s) Oral <User Schedule>  QUEtiapine 12.5 milliGRAM(s) Oral at bedtime  MEDICATIONS  (PRN):  melatonin 3 milliGRAM(s) Oral at bedtime PRN Insomnia  ondansetron Injectable 4 milliGRAM(s) IV Push every 8 hours PRN Nausea and/or Vomiting  
Patient is a 87 y/o male, , has adult children, retired, domiciled in private home with wife. No formal PPHx, on Lexapro by PCP. No known suicidal ideation/HI. No inpatient hospitalizations. PMHx of Knee pain, Parkinson's disease w/ dementia, HTN, AVR, presents BIB wife from home c/o FTT. Psychiatry consulted for dementia & hallucinations. Pt is at baseline, confused and incoherent but not agitated, there are times where he pulls at Sherman but is able to be redirected.     Plan:    continue current treatment:  escitalopram 20 milliGRAM(s) Oral daily  Increase QUEtiapine 50 milliGRAM(s) Oral <User Schedule> @1600  Increase QUEtiapine 30 milliGRAM(s) Oral at bedtime    MEDICATIONS  (PRN):  melatonin 3 milliGRAM(s) Oral at bedtime PRN Insomnia  ondansetron Injectable 4 milliGRAM(s) IV Push every 8 hours PRN Nausea and/or Vomiting  Seroquel 25 mg BID for agitation

## 2023-02-27 NOTE — BH CONSULTATION LIAISON PROGRESS NOTE - NSBHATTESTAPPBILLTIME_PSY_A_CORE
I attest my time as SARI is greater than 50% of the total combined time spent on qualifying patient care activities. I have reviewed and verified the documentation.
I attest my time as SARI is greater than 50% of the total combined time spent on qualifying patient care activities. I have reviewed and verified the documentation.

## 2023-02-28 ENCOUNTER — TRANSCRIPTION ENCOUNTER (OUTPATIENT)
Age: 88
End: 2023-02-28

## 2023-02-28 VITALS
HEART RATE: 113 BPM | TEMPERATURE: 99 F | OXYGEN SATURATION: 99 % | DIASTOLIC BLOOD PRESSURE: 75 MMHG | SYSTOLIC BLOOD PRESSURE: 140 MMHG | RESPIRATION RATE: 18 BRPM

## 2023-02-28 PROCEDURE — 99239 HOSP IP/OBS DSCHRG MGMT >30: CPT

## 2023-02-28 RX ORDER — ONDANSETRON 8 MG/1
4 TABLET, FILM COATED ORAL
Qty: 0 | Refills: 0 | DISCHARGE
Start: 2023-02-28

## 2023-02-28 RX ORDER — PHENAZOPYRIDINE HCL 100 MG
1 TABLET ORAL
Qty: 0 | Refills: 0 | DISCHARGE
Start: 2023-02-28

## 2023-02-28 RX ORDER — POLYETHYLENE GLYCOL 3350 17 G/17G
17 POWDER, FOR SOLUTION ORAL
Qty: 0 | Refills: 0 | DISCHARGE
Start: 2023-02-28

## 2023-02-28 RX ORDER — HYDROMORPHONE HYDROCHLORIDE 2 MG/ML
1 INJECTION INTRAMUSCULAR; INTRAVENOUS; SUBCUTANEOUS
Qty: 0 | Refills: 0 | DISCHARGE
Start: 2023-02-28

## 2023-02-28 RX ORDER — ATORVASTATIN CALCIUM 80 MG/1
1 TABLET, FILM COATED ORAL
Qty: 0 | Refills: 0 | DISCHARGE

## 2023-02-28 RX ORDER — CHOLECALCIFEROL (VITAMIN D3) 125 MCG
1 CAPSULE ORAL
Qty: 0 | Refills: 0 | DISCHARGE

## 2023-02-28 RX ORDER — HYDROMORPHONE HYDROCHLORIDE 2 MG/ML
0.5 INJECTION INTRAMUSCULAR; INTRAVENOUS; SUBCUTANEOUS
Qty: 0 | Refills: 0 | DISCHARGE
Start: 2023-02-28

## 2023-02-28 RX ORDER — TIZANIDINE 4 MG/1
0 TABLET ORAL
Qty: 0 | Refills: 0 | DISCHARGE

## 2023-02-28 RX ADMIN — Medication 10 MILLIGRAM(S): at 05:35

## 2023-02-28 RX ADMIN — HYDROMORPHONE HYDROCHLORIDE 1 MILLIGRAM(S): 2 INJECTION INTRAMUSCULAR; INTRAVENOUS; SUBCUTANEOUS at 00:52

## 2023-02-28 RX ADMIN — HYDROMORPHONE HYDROCHLORIDE 0.5 MILLIGRAM(S): 2 INJECTION INTRAMUSCULAR; INTRAVENOUS; SUBCUTANEOUS at 12:22

## 2023-02-28 RX ADMIN — ESCITALOPRAM OXALATE 20 MILLIGRAM(S): 10 TABLET, FILM COATED ORAL at 10:05

## 2023-02-28 RX ADMIN — OXYMETAZOLINE HYDROCHLORIDE 1 SPRAY(S): 0.5 SPRAY NASAL at 10:04

## 2023-02-28 RX ADMIN — LIDOCAINE 1 PATCH: 4 CREAM TOPICAL at 10:05

## 2023-02-28 RX ADMIN — HYDROMORPHONE HYDROCHLORIDE 1 MILLIGRAM(S): 2 INJECTION INTRAMUSCULAR; INTRAVENOUS; SUBCUTANEOUS at 10:20

## 2023-02-28 RX ADMIN — HYDROMORPHONE HYDROCHLORIDE 0.5 MILLIGRAM(S): 2 INJECTION INTRAMUSCULAR; INTRAVENOUS; SUBCUTANEOUS at 12:40

## 2023-02-28 RX ADMIN — POLYETHYLENE GLYCOL 3350 17 GRAM(S): 17 POWDER, FOR SOLUTION ORAL at 10:04

## 2023-02-28 RX ADMIN — HYDROMORPHONE HYDROCHLORIDE 1 MILLIGRAM(S): 2 INJECTION INTRAMUSCULAR; INTRAVENOUS; SUBCUTANEOUS at 10:04

## 2023-02-28 RX ADMIN — FINASTERIDE 5 MILLIGRAM(S): 5 TABLET, FILM COATED ORAL at 10:04

## 2023-02-28 NOTE — DISCHARGE NOTE PROVIDER - NSDCCPCAREPLAN_GEN_ALL_CORE_FT
PRINCIPAL DISCHARGE DIAGNOSIS  Diagnosis: Adult failure to thrive  Assessment and Plan of Treatment: You have advanced dementia and chronic pain and are no longer walking and having adaquate oral intake. Your pain is no longer controlled with oral pain medicaitons. The palliative team and your family discussed that your comfort will be prioritized. [facility]

## 2023-02-28 NOTE — DISCHARGE NOTE PROVIDER - NSDCFUSCHEDAPPT_GEN_ALL_CORE_FT
Chacho Ramirez  [facility] Physician [facility]  [address] [facility]  Scheduled Appointment: 03/03/2023

## 2023-02-28 NOTE — DISCHARGE NOTE PROVIDER - CARE PROVIDERS DIRECT ADDRESSES
,DirectAddress_Unknown,[email or web address]

## 2023-02-28 NOTE — DISCHARGE NOTE PROVIDER - DETAILS OF MALNUTRITION DIAGNOSIS/DIAGNOSES
This patient has been assessed with a concern for Malnutrition and was treated during this hospitalization for the following Nutrition diagnosis/diagnoses:     -  02/21/2023: Severe protein-calorie malnutrition  
Normal rate, regular rhythm, normal S1, S2 heart sounds heard.

## 2023-02-28 NOTE — DISCHARGE NOTE PROVIDER - CARE PROVIDER_API CALL
Tucker Mcmahan (DMD)  [facility] Dental Medicine  [address], [address]  [place] [address]  Phone: [phone]  Fax: [phone]  Follow Up Time:     Ladarius Byrne)  Urology  [address], [address] [place] [address]  Phone: [phone]  Fax: [phone]  Follow Up Time:

## 2023-02-28 NOTE — DISCHARGE NOTE PROVIDER - NSDCMRMEDTOKEN_GEN_ALL_CORE_FT
acetaminophen 325 mg oral tablet: 2 tab(s) orally every 12 hours  acetaminophen 650 mg oral tablet, extended release: 1 tab(s) orally every 6 hours as needed for fever and/ or mild pain  alfuzosin 10 mg oral tablet, extended release: 1 tab(s) orally once a day  amLODIPine 5 mg oral tablet: tab(s) orally once a day  atorvastatin 10 mg oral tablet: 1 tab(s) orally once a day  escitalopram 20 mg oral tablet: 1 tab(s) orally once a day  finasteride 5 mg oral tablet: 1 tab(s) orally once a day  haloperidol 1 mg oral tablet: 1 tab(s) orally every 6 hours as needed for restlessness/agitation  hydrALAZINE 10 mg oral tablet: orally 2 times a day  hyoscyamine 0.125 mg oral tablet: 1 tab(s) orally or under tongue every 6 hours as needed for terminal/excessive secretions  lidocaine 5% topical film: Apply topically to affected area once a day  Apply daily at 9 AM -9 PM   daily 12 hrs on, 12 hrs off.  LORazepam 0.5 mg oral tablet: 1 tab(s) orally every 6 hours MDD:2 mg  prochlorperazine 10 mg oral tablet: 1 tab(s) orally every 12 hours by mouth as needed for nausea/vomiting   SEROquel 25 mg oral tablet: 1 tab(s) orally once (at bedtime)   SEROquel 25 mg oral tablet: 1 tab(s) orally once a day  at 4 pm   tiZANidine 2 mg oral tablet:   Vitamin D3 2000 intl units (50 mcg) oral tablet: 1 tab(s) orally once a day   acetaminophen 325 mg oral tablet: 2 tab(s) orally every 12 hours  alfuzosin 10 mg oral tablet, extended release: 1 tab(s) orally once a day  amLODIPine 5 mg oral tablet: tab(s) orally once a day  escitalopram 20 mg oral tablet: 1 tab(s) orally once a day  finasteride 5 mg oral tablet: 1 tab(s) orally once a day  haloperidol 1 mg oral tablet: 1 tab(s) orally every 6 hours as needed for restlessness/agitation  hydrALAZINE 10 mg oral tablet: orally 2 times a day  HYDROmorphone: 0.5 milligram(s) injectable every 3 hours, As Needed - for moderate pain  HYDROmorphone: 1 milligram(s) injectable every 4 hours, As Needed - for severe pain  hyoscyamine 0.125 mg oral tablet: 1 tab(s) orally or under tongue every 6 hours as needed for terminal/excessive secretions  lidocaine 5% topical film: Apply topically to affected area once a day  Apply daily at 9 AM -9 PM   daily 12 hrs on, 12 hrs off.  LORazepam: 0.5 milligram(s) injectable every 4 hours  ondansetron 2 mg/mL injectable solution: 4 milligram(s) injectable every 8 hours, As Needed nausea/vomiting  phenazopyridine 100 mg oral tablet: 1 tab(s) orally every 8 hours  polyethylene glycol 3350 oral powder for reconstitution: 17 gram(s) orally once a day  SEROquel 25 mg oral tablet: 1 tab(s) orally once (at bedtime)   SEROquel 25 mg oral tablet: 1 tab(s) orally once a day  at 4 pm

## 2023-02-28 NOTE — DISCHARGE NOTE PROVIDER - NSDCCAREPROVSEEN_GEN_ALL_CORE_FT
Lory, Daniel Fu, Brenainn Dyreyes, Saul Ocampo, Laureen Chery, Max Love, Kathie Russell, Omega Sweeney, Suraj Molina, Nivia Diallo, Abdias Nath, Jeb Brown, Tanya Lane, Sy Patino, Jacinto Ramirez, Malia Evans, [facility]

## 2023-02-28 NOTE — DISCHARGE NOTE PROVIDER - HOSPITAL COURSE
Patient is a 88M w/ PMH BPH, Knee pain, Parkinson's disease w/ dementia, HTN, AVR, presented to  ED on 2/20/2023 by wife from home c/o Failure to thrive, decreased po intake over past several days and not walking due to knee pain and feet pain. Patient is unable to walk due to chronic right knee pain and right foot pain 2/2 neuropathy. He was on celebrex at home. On admission is hemoglobin was 11.9 but dropped to 6.3 within 24 hours, and he received 2 units of pRBC. He was evaluated by GI for an upper GI bleed and EGD showed multiple ulcers without further bleeding. Hgb now stable.  During his stay the patient had urinary retention 2/2 to BPH and was evaluated by urology. He required an indwelling alford after failing a trial of void and was expected to follow up in a week with urology after discharge. However, he could not tolerate the alford and kept attempting to remove it, so it was discontinued. Upon discussion with medicine team, palliative team, and family, the patient’s wife decided she would like to prioritize the patient’s comfort being he has advanced dementia, a bedbound status, hardly eating, and in constant pain. The patient was placed on comfort care and is for inpatient hospice since his pain is not managed with po pain medications. He will be receiving intermittent catheterization for urine retention.      Vital Signs Last 24 Hrs  T(C): 37 (28 Feb 2023 07:45), Max: 37.1 (27 Feb 2023 15:18)  T(F): 98.6 (28 Feb 2023 07:45), Max: 98.7 (27 Feb 2023 15:18)  HR: 113 (28 Feb 2023 07:45) (105 - 114)  BP: 140/75 (28 Feb 2023 07:45) (124/60 - 140/75)  RR: 18 (28 Feb 2023 07:45) (18 - 18)  SpO2: 99% (28 Feb 2023 07:45) (95% - 99%)

## 2023-03-03 ENCOUNTER — APPOINTMENT (OUTPATIENT)
Dept: ORTHOPEDIC SURGERY | Facility: CLINIC | Age: 88
End: 2023-03-03

## 2023-03-07 DIAGNOSIS — I10 ESSENTIAL (PRIMARY) HYPERTENSION: ICD-10-CM

## 2023-03-07 DIAGNOSIS — E43 UNSPECIFIED SEVERE PROTEIN-CALORIE MALNUTRITION: ICD-10-CM

## 2023-03-07 DIAGNOSIS — F05 DELIRIUM DUE TO KNOWN PHYSIOLOGICAL CONDITION: ICD-10-CM

## 2023-03-07 DIAGNOSIS — Z51.5 ENCOUNTER FOR PALLIATIVE CARE: ICD-10-CM

## 2023-03-07 DIAGNOSIS — M25.561 PAIN IN RIGHT KNEE: ICD-10-CM

## 2023-03-07 DIAGNOSIS — K26.4 CHRONIC OR UNSPECIFIED DUODENAL ULCER WITH HEMORRHAGE: ICD-10-CM

## 2023-03-07 DIAGNOSIS — I25.10 ATHEROSCLEROTIC HEART DISEASE OF NATIVE CORONARY ARTERY WITHOUT ANGINA PECTORIS: ICD-10-CM

## 2023-03-07 DIAGNOSIS — Z66 DO NOT RESUSCITATE: ICD-10-CM

## 2023-03-07 DIAGNOSIS — N40.1 BENIGN PROSTATIC HYPERPLASIA WITH LOWER URINARY TRACT SYMPTOMS: ICD-10-CM

## 2023-03-07 DIAGNOSIS — E87.1 HYPO-OSMOLALITY AND HYPONATREMIA: ICD-10-CM

## 2023-03-07 DIAGNOSIS — Z95.2 PRESENCE OF PROSTHETIC HEART VALVE: ICD-10-CM

## 2023-03-07 DIAGNOSIS — G20 PARKINSON'S DISEASE: ICD-10-CM

## 2023-03-07 DIAGNOSIS — R62.7 ADULT FAILURE TO THRIVE: ICD-10-CM

## 2023-03-07 DIAGNOSIS — R33.8 OTHER RETENTION OF URINE: ICD-10-CM

## 2023-03-07 DIAGNOSIS — F02.811 DEMENTIA IN OTHER DISEASES CLASSIFIED ELSEWHERE, UNSPECIFIED SEVERITY, WITH AGITATION: ICD-10-CM

## 2023-03-07 DIAGNOSIS — Z96.641 PRESENCE OF RIGHT ARTIFICIAL HIP JOINT: ICD-10-CM

## 2023-03-07 DIAGNOSIS — Z88.5 ALLERGY STATUS TO NARCOTIC AGENT: ICD-10-CM

## 2023-03-07 DIAGNOSIS — Z78.1 PHYSICAL RESTRAINT STATUS: ICD-10-CM
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
## 2023-03-07 DIAGNOSIS — Z96.651 PRESENCE OF RIGHT ARTIFICIAL KNEE JOINT: ICD-10-CM

## 2023-03-07 DIAGNOSIS — E86.0 DEHYDRATION: ICD-10-CM

## 2023-03-07 DIAGNOSIS — G89.29 OTHER CHRONIC PAIN: ICD-10-CM

## 2023-03-07 DIAGNOSIS — T83.83XA HEMORRHAGE DUE TO GENITOURINARY PROSTHETIC DEVICES, IMPLANTS AND GRAFTS, INITIAL ENCOUNTER: ICD-10-CM

## 2023-03-07 DIAGNOSIS — N17.9 ACUTE KIDNEY FAILURE, UNSPECIFIED: ICD-10-CM
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
## 2023-03-07 DIAGNOSIS — E78.00 PURE HYPERCHOLESTEROLEMIA, UNSPECIFIED: ICD-10-CM
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
## 2024-06-12 NOTE — ED ADULT NURSE NOTE - NSFALLRSKUNASSIST_ED_ALL_ED
[facility] - Periop Services  Discharge Note  Short Stay    Procedure(s) (LRB):  Injection, Joint, Hip right hip injection with anesthesia (Right)      OUTCOME: Patient tolerated treatment/procedure well without complication and is now ready for discharge.    DISPOSITION: Home or Self Care    FINAL DIAGNOSIS:  Primary osteoarthritis of right hip    FOLLOWUP: In clinic    DISCHARGE INSTRUCTIONS:  No discharge procedures on file.      Clinical Reference Documents Added to Patient Instructions         Document    CORTICOSTEROID JOINT INJECTION (ENGLISH)            TIME SPENT ON DISCHARGE: 5 minutes   yes

## 2024-08-26 NOTE — PATIENT PROFILE ADULT - FALL HARM RISK - FALLEN IN PAST
Left voice message to inform that we have cancelled neurosurgery appointment on 08/28 with Elsa VILLATORO per provider message. Provider is recommending to schedule with Neurology in 6-8 weeks with MRI prior. Writer provided neurology scheduling number [phone].  
Accidental fall

## 2024-10-12 NOTE — PROGRESS NOTE ADULT - NSTELEHEALTH_GEN_ALL_CORE
No Benefits, risks, and possible complications of procedure explained to patient/caregiver who verbalized understanding and gave verbal consent.

## 2024-11-26 NOTE — PATIENT PROFILE ADULT - FUNCTIONAL ASSESSMENT - BASIC MOBILITY 1.
Labs reviewed via portal and are stable.  Patient will repeat labs on 12/30/24.        ----- Message from Houston Crawford MD sent at 11/22/2024  1:07 PM CST -----  Results reviewed     1 = Total assistance

## 2024-12-10 NOTE — BH CONSULTATION LIAISON PROGRESS NOTE - GENERAL APPEARANCE
Developmentally delayed
Developmentally delayed
(V5) oriented
Developmentally delayed
Developmentally delayed

## 2025-06-16 NOTE — OCCUPATIONAL THERAPY INITIAL EVALUATION ADULT - PHYSICAL ASSIST/NONPHYSICAL ASSIST: TOILET, REHAB EVAL
Addended by: LUCRETIA WILL on: 6/16/2025 08:03 AM     Modules accepted: Orders    
nonverbal cues (demo/gestures)/1 person assist/verbal cues